# Patient Record
Sex: MALE | Race: WHITE | NOT HISPANIC OR LATINO | Employment: OTHER | URBAN - METROPOLITAN AREA
[De-identification: names, ages, dates, MRNs, and addresses within clinical notes are randomized per-mention and may not be internally consistent; named-entity substitution may affect disease eponyms.]

---

## 2018-04-16 ENCOUNTER — LAB VISIT (OUTPATIENT)
Dept: LAB | Facility: HOSPITAL | Age: 83
End: 2018-04-16
Attending: INTERNAL MEDICINE
Payer: COMMERCIAL

## 2018-04-16 DIAGNOSIS — I10 ESSENTIAL HYPERTENSION, MALIGNANT: Primary | ICD-10-CM

## 2018-04-16 LAB
ALBUMIN SERPL BCP-MCNC: 3.9 G/DL
ALP SERPL-CCNC: 46 U/L
ALT SERPL W/O P-5'-P-CCNC: 16 U/L
ANION GAP SERPL CALC-SCNC: 9 MMOL/L
AST SERPL-CCNC: 18 U/L
BASOPHILS # BLD AUTO: 0.04 K/UL
BASOPHILS NFR BLD: 0.5 %
BILIRUB SERPL-MCNC: 1 MG/DL
BUN SERPL-MCNC: 16 MG/DL
CALCIUM SERPL-MCNC: 9.6 MG/DL
CHLORIDE SERPL-SCNC: 101 MMOL/L
CHOLEST SERPL-MCNC: 167 MG/DL
CHOLEST/HDLC SERPL: 1.5 {RATIO}
CO2 SERPL-SCNC: 27 MMOL/L
CREAT SERPL-MCNC: 1.1 MG/DL
DIFFERENTIAL METHOD: ABNORMAL
EOSINOPHIL # BLD AUTO: 0.1 K/UL
EOSINOPHIL NFR BLD: 0.8 %
ERYTHROCYTE [DISTWIDTH] IN BLOOD BY AUTOMATED COUNT: 13.8 %
EST. GFR  (AFRICAN AMERICAN): >60 ML/MIN/1.73 M^2
EST. GFR  (NON AFRICAN AMERICAN): >60 ML/MIN/1.73 M^2
ESTIMATED AVG GLUCOSE: 105 MG/DL
GLUCOSE SERPL-MCNC: 108 MG/DL
HBA1C MFR BLD HPLC: 5.3 %
HCT VFR BLD AUTO: 40.4 %
HDLC SERPL-MCNC: 111 MG/DL
HDLC SERPL: 66.5 %
HGB BLD-MCNC: 13.5 G/DL
IMM GRANULOCYTES # BLD AUTO: 0.04 K/UL
IMM GRANULOCYTES NFR BLD AUTO: 0.5 %
LDLC SERPL CALC-MCNC: 50 MG/DL
LYMPHOCYTES # BLD AUTO: 1.4 K/UL
LYMPHOCYTES NFR BLD: 17.5 %
MCH RBC QN AUTO: 30.4 PG
MCHC RBC AUTO-ENTMCNC: 33.4 G/DL
MCV RBC AUTO: 91 FL
MONOCYTES # BLD AUTO: 0.7 K/UL
MONOCYTES NFR BLD: 8.6 %
NEUTROPHILS # BLD AUTO: 5.6 K/UL
NEUTROPHILS NFR BLD: 72.1 %
NONHDLC SERPL-MCNC: 56 MG/DL
NRBC BLD-RTO: 0 /100 WBC
PLATELET # BLD AUTO: 309 K/UL
PMV BLD AUTO: 9.2 FL
POTASSIUM SERPL-SCNC: 4 MMOL/L
PROT SERPL-MCNC: 6.9 G/DL
RBC # BLD AUTO: 4.44 M/UL
SODIUM SERPL-SCNC: 137 MMOL/L
TRIGL SERPL-MCNC: 30 MG/DL
TSH SERPL DL<=0.005 MIU/L-ACNC: 1.57 UIU/ML
WBC # BLD AUTO: 7.76 K/UL

## 2018-04-16 PROCEDURE — 83036 HEMOGLOBIN GLYCOSYLATED A1C: CPT

## 2018-04-16 PROCEDURE — 80053 COMPREHEN METABOLIC PANEL: CPT

## 2018-04-16 PROCEDURE — 80061 LIPID PANEL: CPT

## 2018-04-16 PROCEDURE — 84443 ASSAY THYROID STIM HORMONE: CPT

## 2018-04-16 PROCEDURE — 85025 COMPLETE CBC W/AUTO DIFF WBC: CPT

## 2018-04-16 PROCEDURE — 36415 COLL VENOUS BLD VENIPUNCTURE: CPT

## 2018-05-18 ENCOUNTER — TELEPHONE (OUTPATIENT)
Dept: DERMATOLOGY | Facility: CLINIC | Age: 83
End: 2018-05-18

## 2018-05-18 NOTE — TELEPHONE ENCOUNTER
----- Message from Leyla Nevarez sent at 5/18/2018 10:15 AM CDT -----  Contact: pt at [phone]  [facility]Pt needs to cancel and reschedule his appt that is on May 23. Can be reached at above number.Needs Medical Advice    Who Called:pt  Symptoms (please be specific): appt May 23   How long has patient had these symptoms:    Pharmacy name and phone # if needed:    Communication Preference (MyChart response to Pt. (or) Call Back # and timeframe):call/today  Additional Information:Needs to change his appt.

## 2019-04-08 ENCOUNTER — HOSPITAL ENCOUNTER (EMERGENCY)
Facility: HOSPITAL | Age: 84
Discharge: HOME | End: 2019-04-08
Attending: EMERGENCY MEDICINE
Payer: COMMERCIAL

## 2019-04-08 VITALS
TEMPERATURE: 99 F | BODY MASS INDEX: 23.1 KG/M2 | DIASTOLIC BLOOD PRESSURE: 68 MMHG | OXYGEN SATURATION: 93 % | WEIGHT: 165 LBS | HEIGHT: 71 IN | HEART RATE: 102 BPM | SYSTOLIC BLOOD PRESSURE: 116 MMHG | RESPIRATION RATE: 28 BRPM

## 2019-04-08 DIAGNOSIS — R06.00 DYSPNEA: ICD-10-CM

## 2019-04-08 DIAGNOSIS — J18.9 PNEUMONIA OF RIGHT LOWER LOBE DUE TO INFECTIOUS ORGANISM: Primary | ICD-10-CM

## 2019-04-08 LAB
ALBUMIN SERPL BCP-MCNC: 3.3 G/DL (ref 3.5–5.2)
ALP SERPL-CCNC: 48 U/L (ref 55–135)
ALT SERPL W/O P-5'-P-CCNC: 12 U/L (ref 10–44)
ANION GAP SERPL CALC-SCNC: 9 MMOL/L (ref 8–16)
AST SERPL-CCNC: 19 U/L (ref 10–40)
BASOPHILS # BLD AUTO: 0.03 K/UL (ref 0–0.2)
BASOPHILS NFR BLD: 0.2 % (ref 0–1.9)
BILIRUB SERPL-MCNC: 1.1 MG/DL (ref 0.1–1)
BUN SERPL-MCNC: 16 MG/DL (ref 8–23)
CALCIUM SERPL-MCNC: 8.8 MG/DL (ref 8.7–10.5)
CHLORIDE SERPL-SCNC: 99 MMOL/L (ref 95–110)
CO2 SERPL-SCNC: 22 MMOL/L (ref 23–29)
CREAT SERPL-MCNC: 1.1 MG/DL (ref 0.5–1.4)
DIFFERENTIAL METHOD: ABNORMAL
EOSINOPHIL # BLD AUTO: 0 K/UL (ref 0–0.5)
EOSINOPHIL NFR BLD: 0 % (ref 0–8)
ERYTHROCYTE [DISTWIDTH] IN BLOOD BY AUTOMATED COUNT: 14.9 % (ref 11.5–14.5)
EST. GFR  (AFRICAN AMERICAN): >60 ML/MIN/1.73 M^2
EST. GFR  (NON AFRICAN AMERICAN): >60 ML/MIN/1.73 M^2
GLUCOSE SERPL-MCNC: 128 MG/DL (ref 70–110)
HCT VFR BLD AUTO: 33.7 % (ref 40–54)
HGB BLD-MCNC: 11.2 G/DL (ref 14–18)
IMM GRANULOCYTES # BLD AUTO: 0.14 K/UL (ref 0–0.04)
IMM GRANULOCYTES NFR BLD AUTO: 1 % (ref 0–0.5)
INFLUENZA A, MOLECULAR: NEGATIVE
INFLUENZA B, MOLECULAR: NEGATIVE
LACTATE SERPL-SCNC: 1.1 MMOL/L (ref 0.5–2.2)
LYMPHOCYTES # BLD AUTO: 0.5 K/UL (ref 1–4.8)
LYMPHOCYTES NFR BLD: 3.3 % (ref 18–48)
MCH RBC QN AUTO: 29.3 PG (ref 27–31)
MCHC RBC AUTO-ENTMCNC: 33.2 G/DL (ref 32–36)
MCV RBC AUTO: 88 FL (ref 82–98)
MONOCYTES # BLD AUTO: 0.7 K/UL (ref 0.3–1)
MONOCYTES NFR BLD: 4.7 % (ref 4–15)
NEUTROPHILS # BLD AUTO: 12.6 K/UL (ref 1.8–7.7)
NEUTROPHILS NFR BLD: 90.8 % (ref 38–73)
NRBC BLD-RTO: 0 /100 WBC
PLATELET # BLD AUTO: 311 K/UL (ref 150–350)
PMV BLD AUTO: 9.5 FL (ref 9.2–12.9)
POTASSIUM SERPL-SCNC: 3.7 MMOL/L (ref 3.5–5.1)
PROT SERPL-MCNC: 7 G/DL (ref 6–8.4)
RBC # BLD AUTO: 3.82 M/UL (ref 4.6–6.2)
SODIUM SERPL-SCNC: 130 MMOL/L (ref 136–145)
SPECIMEN SOURCE: NORMAL
WBC # BLD AUTO: 13.84 K/UL (ref 3.9–12.7)

## 2019-04-08 PROCEDURE — 71046 X-RAY EXAM CHEST 2 VIEWS: CPT | Mod: 26,,, | Performed by: RADIOLOGY

## 2019-04-08 PROCEDURE — 85025 COMPLETE CBC W/AUTO DIFF WBC: CPT

## 2019-04-08 PROCEDURE — 87502 INFLUENZA DNA AMP PROBE: CPT

## 2019-04-08 PROCEDURE — 80053 COMPREHEN METABOLIC PANEL: CPT

## 2019-04-08 PROCEDURE — 71046 X-RAY EXAM CHEST 2 VIEWS: CPT | Mod: TC,FY

## 2019-04-08 PROCEDURE — 63600175 PHARM REV CODE 636 W HCPCS: Performed by: EMERGENCY MEDICINE

## 2019-04-08 PROCEDURE — 94640 AIRWAY INHALATION TREATMENT: CPT

## 2019-04-08 PROCEDURE — 71046 XR CHEST PA AND LATERAL: ICD-10-PCS | Mod: 26,,, | Performed by: RADIOLOGY

## 2019-04-08 PROCEDURE — 25000242 PHARM REV CODE 250 ALT 637 W/ HCPCS: Performed by: EMERGENCY MEDICINE

## 2019-04-08 PROCEDURE — 96365 THER/PROPH/DIAG IV INF INIT: CPT

## 2019-04-08 PROCEDURE — 99284 EMERGENCY DEPT VISIT MOD MDM: CPT | Mod: 25

## 2019-04-08 PROCEDURE — 83605 ASSAY OF LACTIC ACID: CPT

## 2019-04-08 PROCEDURE — 87040 BLOOD CULTURE FOR BACTERIA: CPT | Mod: 59

## 2019-04-08 RX ORDER — IPRATROPIUM BROMIDE AND ALBUTEROL SULFATE 2.5; .5 MG/3ML; MG/3ML
3 SOLUTION RESPIRATORY (INHALATION)
Status: COMPLETED | OUTPATIENT
Start: 2019-04-08 | End: 2019-04-08

## 2019-04-08 RX ORDER — LEVOFLOXACIN 750 MG/1
750 TABLET ORAL DAILY
Qty: 5 TABLET | Refills: 0 | Status: SHIPPED | OUTPATIENT
Start: 2019-04-08 | End: 2021-03-03 | Stop reason: CLARIF

## 2019-04-08 RX ADMIN — CEFTRIAXONE 1 G: 1 INJECTION, SOLUTION INTRAVENOUS at 09:04

## 2019-04-08 RX ADMIN — IPRATROPIUM BROMIDE AND ALBUTEROL SULFATE 3 ML: .5; 3 SOLUTION RESPIRATORY (INHALATION) at 09:04

## 2019-04-09 NOTE — DISCHARGE INSTRUCTIONS
Rocephin 1 g IV piggyback    At home  Levaquin 750 mg 1 daily for 5 days    Call Dr. Martinez in the morning and follow up in clinic tomorrow as directed     Return to the emergency department as needed for any progressive worsening

## 2019-04-09 NOTE — ED NOTES
"Pt ambulates in hallway with slightly unsteady gait. Pt reports "that is nothing new" VS obtained when back in room  "

## 2019-04-09 NOTE — ED NOTES
Pt states that he feels better and is ready to go home. Awaiting re-eval. Family at bs. Will continue to monitor.

## 2019-04-14 LAB
BACTERIA BLD CULT: NORMAL
BACTERIA BLD CULT: NORMAL

## 2019-04-15 NOTE — ED PROVIDER NOTES
Encounter Date: 4/8/2019       History     Chief Complaint   Patient presents with    Fever    Weakness    Vomiting    Shortness of Breath     [age] year old male presents complaining of several days of progressive fever generalized weakness shortness of breath with cough and occasional vomiting    Denies any acute chest pain  Denies any acute dyspnea  Denies hemoptysis        Review of patient's allergies indicates:  No Known Allergies  Past Medical History:   Diagnosis Date    Hyperlipidemia     Hypertension      Past Surgical History:   Procedure Laterality Date    TONSILLECTOMY       History reviewed. No pertinent family history.  Social History     Tobacco Use    Smoking status: Former Smoker   Substance Use Topics    Alcohol use: Yes    Drug use: Never     Review of Systems   Constitutional: Positive for activity change, chills, diaphoresis, fatigue and fever. Negative for appetite change and unexpected weight change.   HENT: Positive for congestion.    Eyes: Negative for discharge.   Respiratory: Positive for cough. Negative for choking, chest tightness, shortness of breath, wheezing and stridor.    Cardiovascular: Negative.    Gastrointestinal: Positive for vomiting. Negative for abdominal pain, diarrhea and nausea.   Genitourinary: Negative.    Musculoskeletal: Negative.    Skin: Negative.    Neurological: Negative.    Hematological: Negative.    Psychiatric/Behavioral: Negative for confusion.   All other systems reviewed and are negative.      Physical Exam     Initial Vitals [04/08/19 1928]   BP Pulse Resp Temp SpO2   135/71 (!) 112 (!) 24 98.9 °F (37.2 °C) (!) 93 %      MAP       --         Physical Exam    Nursing note and vitals reviewed.  Constitutional: He appears well-developed and well-nourished. He is not diaphoretic. No distress.   HENT:   Head: Normocephalic and atraumatic.   Mouth/Throat: Oropharynx is clear and moist.   Eyes: Conjunctivae and EOM are normal. Pupils are equal,  round, and reactive to light.   Neck: Neck supple. Carotid bruit is not present. No JVD present.   Cardiovascular: Regular rhythm, S1 normal, S2 normal, normal heart sounds and intact distal pulses.  No extrasystoles are present.  Tachycardia present.  Exam reveals no gallop and no friction rub.    No murmur heard.  Pulses:       Radial pulses are 2+ on the right side, and 2+ on the left side.   Pulmonary/Chest: No respiratory distress. He has decreased breath sounds. He has no wheezes. He has rhonchi in the right lower field and the left lower field. He has no rales. He exhibits no tenderness.   Abdominal: Soft. Bowel sounds are normal. He exhibits no distension and no mass. There is no tenderness. There is no rebound and no guarding.   Musculoskeletal: Normal range of motion. He exhibits no edema or tenderness.   Neurological: He is alert and oriented to person, place, and time. He has normal strength. No sensory deficit.   Skin: Skin is warm and dry. Capillary refill takes less than 2 seconds. No rash noted. No erythema. No pallor.   Psychiatric: He has a normal mood and affect. His behavior is normal. Judgment and thought content normal.         ED Course   Procedures  Labs Reviewed   CBC W/ AUTO DIFFERENTIAL - Abnormal; Notable for the following components:       Result Value    WBC 13.84 (*)     RBC 3.82 (*)     Hemoglobin 11.2 (*)     Hematocrit 33.7 (*)     RDW 14.9 (*)     Immature Granulocytes 1.0 (*)     Gran # (ANC) 12.6 (*)     Immature Grans (Abs) 0.14 (*)     Lymph # 0.5 (*)     Gran% 90.8 (*)     Lymph% 3.3 (*)     All other components within normal limits   COMPREHENSIVE METABOLIC PANEL - Abnormal; Notable for the following components:    Sodium 130 (*)     CO2 22 (*)     Glucose 128 (*)     Albumin 3.3 (*)     Total Bilirubin 1.1 (*)     Alkaline Phosphatase 48 (*)     All other components within normal limits   CULTURE, BLOOD   INFLUENZA A & B BY MOLECULAR   CULTURE, BLOOD   LACTIC ACID, PLASMA           Imaging Results          X-Ray Chest PA And Lateral (Final result)  Result time 04/09/19 06:31:02   Procedure changed from X-Ray Chest 1 View     Final result by Cheng Diaz MD (04/09/19 06:31:02)                 Impression:      1. Pulmonary infiltrates of the right upper and right lower lobe consistent with a multi lobar pneumonia.  Correlate clinically with possible fever and/or elevated white count.  2. Small right pleural effusion.  3. Cardiomegaly.  4. No underlying COPD.  Close interval follow-up is recommended.      Electronically signed by: Cheng Diaz  Date:    04/09/2019  Time:    06:31             Narrative:    EXAMINATION:  XR CHEST PA AND LATERAL    CLINICAL HISTORY:  COUGH; Dyspnea, unspecified    TECHNIQUE:  PA and lateral views of the chest were performed.    COMPARISON:  None    FINDINGS:  There is pulmonary hyperinflation with flattening diaphragms consistent with COPD.  Interstitial and alveolar pulmonary infiltrates are present within the right upper lobe in the posterior aspect of the right lower lobe consistent with a multi lobar pneumonia.  There is a small right pleural effusion.  Dependent atelectasis is present at the left lung base.    Heart size is enlarged.  Calcified aortic plaque.  Trachea midline.    Bony thorax intact.                                 Medical Decision Making:   Clinical Tests:   Lab Tests: Ordered and Reviewed  Radiological Study: Ordered and Reviewed  ED Management:  R pneumonia  Pt desires DC home with close follow up rather than suggested admission to [facility] for treatment and re evaluation tomorrow.          Rocephin 1 g IV piggyback    At home  Levaquin 750 mg 1 daily for 5 days    Call Dr. Martinez in the morning and follow up in clinic tomorrow as directed     Return to the emergency department as needed for any progressive worsening  Other:   I have discussed this case with another health care provider.       <> Summary of the Discussion:   Juan prior to patients DC home                       Clinical Impression:       ICD-10-CM ICD-9-CM   1. Pneumonia of right lower lobe due to infectious organism J18.1 486   2. Dyspnea R06.00 786.09         Disposition:   Disposition: Discharged  Condition: Stable                        Hernando Ramirez MD  04/15/19 0614

## 2020-06-17 ENCOUNTER — OFFICE VISIT (OUTPATIENT)
Dept: SURGERY | Facility: CLINIC | Age: 85
End: 2020-06-17
Payer: MEDICARE

## 2020-06-17 VITALS
OXYGEN SATURATION: 98 % | WEIGHT: 192.44 LBS | TEMPERATURE: 98 F | SYSTOLIC BLOOD PRESSURE: 127 MMHG | HEART RATE: 77 BPM | BODY MASS INDEX: 26.07 KG/M2 | RESPIRATION RATE: 14 BRPM | DIASTOLIC BLOOD PRESSURE: 78 MMHG | HEIGHT: 72 IN

## 2020-06-17 DIAGNOSIS — Z01.818 PRE-OPERATIVE EXAM: ICD-10-CM

## 2020-06-17 DIAGNOSIS — R19.5 POSITIVE COLORECTAL CANCER SCREENING USING COLOGUARD TEST: Primary | ICD-10-CM

## 2020-06-17 PROCEDURE — 99204 OFFICE O/P NEW MOD 45 MIN: CPT | Mod: S$GLB,,, | Performed by: SURGERY

## 2020-06-17 PROCEDURE — 1159F PR MEDICATION LIST DOCUMENTED IN MEDICAL RECORD: ICD-10-PCS | Mod: S$GLB,,, | Performed by: SURGERY

## 2020-06-17 PROCEDURE — 99204 PR OFFICE/OUTPT VISIT, NEW, LEVL IV, 45-59 MIN: ICD-10-PCS | Mod: S$GLB,,, | Performed by: SURGERY

## 2020-06-17 PROCEDURE — 1101F PR PT FALLS ASSESS DOC 0-1 FALLS W/OUT INJ PAST YR: ICD-10-PCS | Mod: CPTII,S$GLB,, | Performed by: SURGERY

## 2020-06-17 PROCEDURE — 1126F PR PAIN SEVERITY QUANTIFIED, NO PAIN PRESENT: ICD-10-PCS | Mod: S$GLB,,, | Performed by: SURGERY

## 2020-06-17 PROCEDURE — 1159F MED LIST DOCD IN RCRD: CPT | Mod: S$GLB,,, | Performed by: SURGERY

## 2020-06-17 PROCEDURE — 1126F AMNT PAIN NOTED NONE PRSNT: CPT | Mod: S$GLB,,, | Performed by: SURGERY

## 2020-06-17 PROCEDURE — 1101F PT FALLS ASSESS-DOCD LE1/YR: CPT | Mod: CPTII,S$GLB,, | Performed by: SURGERY

## 2020-06-17 RX ORDER — TAMSULOSIN HYDROCHLORIDE 0.4 MG/1
0.4 CAPSULE ORAL NIGHTLY
COMMUNITY
End: 2022-10-18 | Stop reason: SDUPTHER

## 2020-06-17 RX ORDER — ESZOPICLONE 3 MG/1
3 TABLET, FILM COATED ORAL NIGHTLY
COMMUNITY
End: 2022-10-18

## 2020-06-17 RX ORDER — SODIUM CHLORIDE 9 MG/ML
INJECTION, SOLUTION INTRAVENOUS CONTINUOUS
Status: CANCELLED | OUTPATIENT
Start: 2020-06-17

## 2020-06-17 RX ORDER — OXYCODONE AND ACETAMINOPHEN 10; 325 MG/1; MG/1
1 TABLET ORAL EVERY 8 HOURS PRN
COMMUNITY
End: 2021-03-03 | Stop reason: CLARIF

## 2020-06-17 RX ORDER — AMLODIPINE BESYLATE 10 MG/1
10 TABLET ORAL DAILY
COMMUNITY
End: 2022-10-18 | Stop reason: SDUPTHER

## 2020-06-17 RX ORDER — SIMVASTATIN 40 MG/1
40 TABLET, FILM COATED ORAL NIGHTLY
COMMUNITY
End: 2022-10-18 | Stop reason: SDUPTHER

## 2020-06-17 NOTE — H&P
[facility] Surgery H&P Note    Subjective:       Patient ID: Robert Nava is a 87 y.o. male.    Chief Complaint: Consult (REferral- Josh- (+) Colstevie)    HPI:  Robert Nava is a 87 y.o. male with a history of hypertension hyperlipidemia presents today as a new patient referral from Dr. Moreno for evaluation of a positive colo Guard test results.  Patient had a wife who was diagnosed with stage IV colon cancer subsequently underwent colon resection and a since passed away.  Patient presented to his primary care physician, Dr. Moreno, and underwent a colo Guard test for screening of the patient's colon.  Rupert Guard test results resulted as positive.  Patient does not endorse blood in the stool.  No personal history or related family history of colon cancer.  No unexplained bowel habit changes or weight loss.  Given the positive colo Guard test results recently obtained, patient was referred today for evaluation management treatment.    Past Medical History:   Diagnosis Date    Hyperlipidemia     Hypertension      Past Surgical History:   Procedure Laterality Date    COLONOSCOPY      aprox 2008    TONSILLECTOMY       History reviewed. No pertinent family history.  Social History     Socioeconomic History    Marital status: Single     Spouse name: Not on file    Number of children: Not on file    Years of education: Not on file    Highest education level: Not on file   Occupational History    Not on file   Social Needs    Financial resource strain: Not on file    Food insecurity     Worry: Not on file     Inability: Not on file    Transportation needs     Medical: Not on file     Non-medical: Not on file   Tobacco Use    Smoking status: Former Smoker    Smokeless tobacco: Never Used   Substance and Sexual Activity    Alcohol use: Yes    Drug use: Never    Sexual activity: Not Currently   Lifestyle    Physical activity     Days per week: Not on file     Minutes per session: Not  on file    Stress: Not on file   Relationships    Social connections     Talks on phone: Not on file     Gets together: Not on file     Attends Mandaeism service: Not on file     Active member of club or organization: Not on file     Attends meetings of clubs or organizations: Not on file     Relationship status: Not on file   Other Topics Concern    Not on file   Social History Narrative    Not on file       Current Outpatient Medications   Medication Sig Dispense Refill    amLODIPine (NORVASC) 10 MG tablet Take 10 mg by mouth once daily.      eszopiclone (LUNESTA) 3 mg Tab Take 3 mg by mouth every evening.      oxyCODONE-acetaminophen (PERCOCET)  mg per tablet Take 1 tablet by mouth every 8 (eight) hours as needed for Pain.      simvastatin (ZOCOR) 40 MG tablet Take 40 mg by mouth every evening.      tamsulosin (FLOMAX) 0.4 mg Cap Take 0.4 mg by mouth every evening.      levoFLOXacin (LEVAQUIN) 750 MG tablet Take 1 tablet (750 mg total) by mouth once daily. (Patient not taking: Reported on 6/17/2020) 5 tablet 0     No current facility-administered medications for this visit.      Review of patient's allergies indicates:  No Known Allergies    Review of Systems   Constitutional: Negative for appetite change, chills and fever.   HENT: Negative for congestion, dental problem and drooling.    Eyes: Negative for photophobia, discharge and itching.   Respiratory: Negative for apnea and chest tightness.    Cardiovascular: Negative for chest pain, palpitations and leg swelling.   Gastrointestinal: Negative for abdominal distention and abdominal pain.   Endocrine: Negative for cold intolerance and heat intolerance.   Genitourinary: Negative for difficulty urinating and dysuria.   Musculoskeletal: Negative for arthralgias and back pain.   Skin: Negative for color change and pallor.   Neurological: Negative for dizziness, facial asymmetry and headaches.   Hematological: Negative for adenopathy. Does not  "bruise/bleed easily.   Psychiatric/Behavioral: Negative for agitation, behavioral problems and confusion.       Objective:      Vitals:    06/17/20 1037   BP: 127/78   BP Location: Left arm   Patient Position: Sitting   BP Method: Large (Automatic)   Pulse: 77   Resp: 14   Temp: 98.3 °F (36.8 °C)   TempSrc: Oral   SpO2: 98%   Weight: 87.3 kg (192 lb 7.4 oz)   Height: 5' 11.5" (1.816 m)     Physical Exam  Constitutional:       Appearance: He is well-developed. He is not diaphoretic.   HENT:      Head: Normocephalic and atraumatic.   Eyes:      Pupils: Pupils are equal, round, and reactive to light.   Neck:      Musculoskeletal: Normal range of motion and neck supple.      Thyroid: No thyromegaly.   Cardiovascular:      Rate and Rhythm: Normal rate and regular rhythm.      Heart sounds: No murmur.   Pulmonary:      Effort: Pulmonary effort is normal. No respiratory distress.      Breath sounds: Normal breath sounds.   Abdominal:      General: Bowel sounds are normal. There is no distension.      Palpations: Abdomen is soft.      Tenderness: There is no abdominal tenderness.   Musculoskeletal: Normal range of motion.   Skin:     General: Skin is warm.      Capillary Refill: Capillary refill takes less than 2 seconds.      Findings: No erythema or rash.   Neurological:      Mental Status: He is alert and oriented to person, place, and time.      Cranial Nerves: No cranial nerve deficit.       Cologuard test positive.     Assessment:       1. Positive colorectal cancer screening using Cologuard test    2. Pre-operative exam        Plan:   Positive colorectal cancer screening using Cologuard test  -     CBC auto differential; Future; Expected date: 09/17/2020  -     Comprehensive metabolic panel; Future; Expected date: 09/17/2020  -     EKG 12-lead; Future; Expected date: 09/17/2020  -     Case Request Operating Room: COLONOSCOPY  -     Insert peripheral IV; Standing  -     Full code; Standing    Pre-operative exam  -     " EKG 12-lead; Future; Expected date: 09/17/2020  -     Case Request Operating Room: COLONOSCOPY  -     Insert peripheral IV; Standing  -     Full code; Standing    Other orders  -     Progressive Mobility Protocol (mobilize patient to their highest level of functioning at least twice daily); Standing        Medical Decision Making/Counseling:  Patient with positive colo Guard test results.  Patient in need of diagnostic colonoscopy given positive colo Guard test results.  Risk and benefits of colonoscopy were discussed in detail in clinic with the patient today.  After risk benefits discussion, patient voiced understanding risk benefits wished to proceed the near future.  All questions were answered to the patient's satisfaction.    Will obtain preoperative lab test to include CBC, CMP and EKG for review by the Anesthesiologist the day of the procedure prior to induction of the anesthetic agent of choice.    Risks and benefits of endoscopy were discussed in depth in clinic.  From a procedural standpoint, we discuss the benefits of colonoscopy to be finding colon cancers at early stages, including polyps which can be endoscopically resectable, to finding early stage colon cancers which can be better treated with current medical and surgical therapies in order to give patients a longer survival, if found in these early stages.  From a standpoint of risks, the risk of bleeding and perforation of the colon were discussed.  I personally discussed that if complications of bleeding or perforation were to occur, the patient could need as little as a blood transfusion and as much as possible hospital admission, repeat procedure, or even surgery.  During today's discussion of the procedure of colonoscopy with the patient, I personally jay the patient a picture to assist with counseling.  Total clinic time spent today 45 minutes with greater than half of the time spent in face to face counseling.    Patient instructed that  best way to communicate with my office staff is for patient to get on the [facility] patient portal to expedite communication and communication issues that may occur.  Patient was given instructions on how to get on the portal.  I encouraged patient to obtain portal access as well.  Ultimately it is up to the patient to obtain access.  Patient voiced understanding.

## 2020-06-17 NOTE — LETTER
June 17, 2020      Warren Moreno MD  [address] [place] [address]           [facility]  [address] [place] [address]  Phone: [phone]  Fax: [phone]          Patient: Robert Nava   MR Number: [id number]   YOB: 1933   Date of Visit: 6/17/2020       Dear Dr. Warren Moreno:    Thank you for referring Robert Nava to me for evaluation. Attached you will find relevant portions of my assessment and plan of care.    If you have questions, please do not hesitate to call me. I look forward to following Robert Nava along with you.    Sincerely,    Jersey Adam MD    Enclosure  CC:  No Recipients    If you would like to receive this communication electronically, please contact [email or web address] or [phone] to request more information on [employer] Link access.    For providers and/or their staff who would like to refer a patient to [facility], please contact us through our one-stop-shop provider referral line, [facility], at [phone].    If you feel you have received this communication in error or would no longer like to receive these types of communications, please e-mail [email or web address]

## 2020-07-24 ENCOUNTER — HOSPITAL ENCOUNTER (OUTPATIENT)
Dept: PREADMISSION TESTING | Facility: HOSPITAL | Age: 85
Discharge: HOME | End: 2020-07-24
Attending: SURGERY
Payer: MEDICARE

## 2020-07-24 ENCOUNTER — HOSPITAL ENCOUNTER (OUTPATIENT)
Dept: CARDIOLOGY | Facility: HOSPITAL | Age: 85
Discharge: HOME | End: 2020-07-24
Attending: SURGERY
Payer: MEDICARE

## 2020-07-24 VITALS — HEIGHT: 72 IN | BODY MASS INDEX: 26.14 KG/M2 | WEIGHT: 193 LBS

## 2020-07-24 DIAGNOSIS — R19.5 POSITIVE COLORECTAL CANCER SCREENING USING COLOGUARD TEST: ICD-10-CM

## 2020-07-24 DIAGNOSIS — Z01.818 PRE-OP EXAM: ICD-10-CM

## 2020-07-24 DIAGNOSIS — Z01.818 PRE-OPERATIVE EXAM: ICD-10-CM

## 2020-07-24 PROCEDURE — U0003 INFECTIOUS AGENT DETECTION BY NUCLEIC ACID (DNA OR RNA); SEVERE ACUTE RESPIRATORY SYNDROME CORONAVIRUS 2 (SARS-COV-2) (CORONAVIRUS DISEASE [COVID-19]), AMPLIFIED PROBE TECHNIQUE, MAKING USE OF HIGH THROUGHPUT TECHNOLOGIES AS DESCRIBED BY CMS-2020-01-R: HCPCS

## 2020-07-24 PROCEDURE — 93005 ELECTROCARDIOGRAM TRACING: CPT

## 2020-07-25 LAB — SARS-COV-2 RNA RESP QL NAA+PROBE: NOT DETECTED

## 2020-07-27 ENCOUNTER — HOSPITAL ENCOUNTER (OUTPATIENT)
Facility: HOSPITAL | Age: 85
Discharge: HOME | End: 2020-07-27
Attending: SURGERY | Admitting: SURGERY
Payer: MEDICARE

## 2020-07-27 ENCOUNTER — ANESTHESIA (OUTPATIENT)
Dept: SURGERY | Facility: HOSPITAL | Age: 85
End: 2020-07-27
Payer: MEDICARE

## 2020-07-27 ENCOUNTER — ANESTHESIA EVENT (OUTPATIENT)
Dept: SURGERY | Facility: HOSPITAL | Age: 85
End: 2020-07-27
Payer: MEDICARE

## 2020-07-27 VITALS
HEIGHT: 71 IN | DIASTOLIC BLOOD PRESSURE: 84 MMHG | BODY MASS INDEX: 27.02 KG/M2 | OXYGEN SATURATION: 98 % | TEMPERATURE: 98 F | SYSTOLIC BLOOD PRESSURE: 155 MMHG | RESPIRATION RATE: 21 BRPM | WEIGHT: 193 LBS | HEART RATE: 73 BPM

## 2020-07-27 DIAGNOSIS — R19.5 POSITIVE COLORECTAL CANCER SCREENING USING COLOGUARD TEST: ICD-10-CM

## 2020-07-27 DIAGNOSIS — Z01.818 PRE-OPERATIVE EXAM: ICD-10-CM

## 2020-07-27 PROCEDURE — 45388 COLONOSCOPY W/ABLATION: CPT | Performed by: SURGERY

## 2020-07-27 PROCEDURE — 45384 PR COLONOSCOPY,REMV LESN,FORCEP/CAUTERY: ICD-10-PCS | Mod: 59,,, | Performed by: SURGERY

## 2020-07-27 PROCEDURE — D9220A PRA ANESTHESIA: Mod: CRNA,,, | Performed by: NURSE ANESTHETIST, CERTIFIED REGISTERED

## 2020-07-27 PROCEDURE — S0028 INJECTION, FAMOTIDINE, 20 MG: HCPCS

## 2020-07-27 PROCEDURE — 27201423 OPTIME MED/SURG SUP & DEVICES STERILE SUPPLY: Performed by: SURGERY

## 2020-07-27 PROCEDURE — 45384 COLONOSCOPY W/LESION REMOVAL: CPT | Performed by: SURGERY

## 2020-07-27 PROCEDURE — 25000003 PHARM REV CODE 250

## 2020-07-27 PROCEDURE — D9220A PRA ANESTHESIA: ICD-10-PCS | Mod: CRNA,,, | Performed by: NURSE ANESTHETIST, CERTIFIED REGISTERED

## 2020-07-27 PROCEDURE — D9220A PRA ANESTHESIA: ICD-10-PCS | Mod: ANES,,, | Performed by: ANESTHESIOLOGY

## 2020-07-27 PROCEDURE — 45385 COLONOSCOPY W/LESION REMOVAL: CPT | Mod: 59 | Performed by: SURGERY

## 2020-07-27 PROCEDURE — 45388 COLONOSCOPY W/ABLATION: CPT | Mod: ,,, | Performed by: SURGERY

## 2020-07-27 PROCEDURE — 88305 TISSUE EXAM BY PATHOLOGIST: ICD-10-PCS | Mod: 26,,, | Performed by: PATHOLOGY

## 2020-07-27 PROCEDURE — 25000003 PHARM REV CODE 250: Performed by: SURGERY

## 2020-07-27 PROCEDURE — D9220A PRA ANESTHESIA: Mod: ANES,,, | Performed by: ANESTHESIOLOGY

## 2020-07-27 PROCEDURE — 37000009 HC ANESTHESIA EA ADD 15 MINS: Performed by: SURGERY

## 2020-07-27 PROCEDURE — 45382 COLONOSCOPY W/CONTROL BLEED: CPT | Performed by: SURGERY

## 2020-07-27 PROCEDURE — 88305 TISSUE EXAM BY PATHOLOGIST: CPT | Mod: 26,,, | Performed by: PATHOLOGY

## 2020-07-27 PROCEDURE — 88305 TISSUE EXAM BY PATHOLOGIST: CPT | Performed by: PATHOLOGY

## 2020-07-27 PROCEDURE — 45388: ICD-10-PCS | Mod: ,,, | Performed by: SURGERY

## 2020-07-27 PROCEDURE — 37000008 HC ANESTHESIA 1ST 15 MINUTES: Performed by: SURGERY

## 2020-07-27 PROCEDURE — 63600175 PHARM REV CODE 636 W HCPCS: Performed by: NURSE ANESTHETIST, CERTIFIED REGISTERED

## 2020-07-27 PROCEDURE — 45385 COLONOSCOPY W/LESION REMOVAL: CPT | Mod: 59,,, | Performed by: SURGERY

## 2020-07-27 PROCEDURE — 45385 PR COLONOSCOPY,REMV LESN,SNARE: ICD-10-PCS | Mod: 59,,, | Performed by: SURGERY

## 2020-07-27 PROCEDURE — 45384 COLONOSCOPY W/LESION REMOVAL: CPT | Mod: 59,,, | Performed by: SURGERY

## 2020-07-27 RX ORDER — SODIUM CHLORIDE, SODIUM LACTATE, POTASSIUM CHLORIDE, CALCIUM CHLORIDE 600; 310; 30; 20 MG/100ML; MG/100ML; MG/100ML; MG/100ML
125 INJECTION, SOLUTION INTRAVENOUS CONTINUOUS
Status: DISCONTINUED | OUTPATIENT
Start: 2020-07-27 | End: 2020-07-27 | Stop reason: HOSPADM

## 2020-07-27 RX ORDER — SODIUM CHLORIDE 9 MG/ML
INJECTION, SOLUTION INTRAVENOUS CONTINUOUS
Status: DISCONTINUED | OUTPATIENT
Start: 2020-07-27 | End: 2020-07-27 | Stop reason: HOSPADM

## 2020-07-27 RX ORDER — PROPOFOL 10 MG/ML
VIAL (ML) INTRAVENOUS
Status: DISCONTINUED | OUTPATIENT
Start: 2020-07-27 | End: 2020-07-27

## 2020-07-27 RX ORDER — FAMOTIDINE 10 MG/ML
INJECTION INTRAVENOUS
Status: COMPLETED
Start: 2020-07-27 | End: 2020-07-27

## 2020-07-27 RX ORDER — LIDOCAINE HYDROCHLORIDE 20 MG/ML
INJECTION INTRAVENOUS
Status: DISCONTINUED | OUTPATIENT
Start: 2020-07-27 | End: 2020-07-27

## 2020-07-27 RX ORDER — ONDANSETRON 2 MG/ML
4 INJECTION INTRAMUSCULAR; INTRAVENOUS DAILY PRN
Status: DISCONTINUED | OUTPATIENT
Start: 2020-07-27 | End: 2020-07-27 | Stop reason: HOSPADM

## 2020-07-27 RX ORDER — FAMOTIDINE 10 MG/ML
20 INJECTION INTRAVENOUS ONCE
Status: COMPLETED | OUTPATIENT
Start: 2020-07-27 | End: 2020-07-27

## 2020-07-27 RX ORDER — DIPHENHYDRAMINE HYDROCHLORIDE 50 MG/ML
12.5 INJECTION INTRAMUSCULAR; INTRAVENOUS
Status: DISCONTINUED | OUTPATIENT
Start: 2020-07-27 | End: 2020-07-27 | Stop reason: HOSPADM

## 2020-07-27 RX ORDER — SODIUM CHLORIDE, SODIUM LACTATE, POTASSIUM CHLORIDE, CALCIUM CHLORIDE 600; 310; 30; 20 MG/100ML; MG/100ML; MG/100ML; MG/100ML
INJECTION, SOLUTION INTRAVENOUS CONTINUOUS
Status: DISCONTINUED | OUTPATIENT
Start: 2020-07-27 | End: 2020-07-27 | Stop reason: HOSPADM

## 2020-07-27 RX ORDER — LIDOCAINE HYDROCHLORIDE 10 MG/ML
1 INJECTION, SOLUTION EPIDURAL; INFILTRATION; INTRACAUDAL; PERINEURAL ONCE
Status: DISCONTINUED | OUTPATIENT
Start: 2020-07-27 | End: 2020-07-27 | Stop reason: HOSPADM

## 2020-07-27 RX ADMIN — PROPOFOL 50 MG: 10 INJECTION, EMULSION INTRAVENOUS at 09:07

## 2020-07-27 RX ADMIN — SODIUM CHLORIDE: 0.9 INJECTION, SOLUTION INTRAVENOUS at 08:07

## 2020-07-27 RX ADMIN — LIDOCAINE HYDROCHLORIDE 100 MG: 20 INJECTION, SOLUTION INTRAVENOUS at 09:07

## 2020-07-27 RX ADMIN — FAMOTIDINE 20 MG: 10 INJECTION INTRAVENOUS at 08:07

## 2020-07-27 NOTE — TRANSFER OF CARE
"Anesthesia Transfer of Care Note    Patient: Robert Nava    Procedure(s) Performed: Procedure(s) (LRB):  COLONOSCOPY (N/A)    Patient location: PACU    Anesthesia Type: general    Transport from OR: Transported from OR on room air with adequate spontaneous ventilation    Post pain: adequate analgesia    Post assessment: no apparent anesthetic complications and tolerated procedure well    Post vital signs: stable    Level of consciousness: awake, alert and oriented    Nausea/Vomiting: no nausea/vomiting    Complications: none    Transfer of care protocol was followed      Last vitals:   Visit Vitals  BP (!) 141/79   Pulse 93   Temp 36.6 °C (97.8 °F) (Oral)   Resp 16   Ht 5' 11" (1.803 m)   Wt 87.5 kg (193 lb)   SpO2 98%   BMI 26.92 kg/m²     "

## 2020-07-27 NOTE — ANESTHESIA PREPROCEDURE EVALUATION
07/27/2020  Robert Nava is a 87 y.o., male.    Anesthesia Evaluation    I have reviewed the Patient Summary Reports.    I have reviewed the Nursing Notes. I have reviewed the NPO Status.   I have reviewed the Medications.     Review of Systems  Hematology/Oncology:  Hematology Normal   Oncology Normal     EENT/Dental:EENT/Dental Normal   Cardiovascular:   Hypertension Dysrhythmias    Pulmonary:  Pulmonary Normal    Musculoskeletal:  Musculoskeletal Normal    Neurological:  Neurology Normal    Dermatological:  Skin Normal    Psych:  Psychiatric Normal           Physical Exam  General:  Well nourished    Airway/Jaw/Neck:  Airway Findings: Tongue: Normal General Airway Assessment: Adult  Mallampati: II  TM Distance: Normal, at least 6 cm      Dental:  Dental Findings: Edentulous   Chest/Lungs:  Chest/Lungs Findings: Clear to auscultation     Heart/Vascular:  Heart Findings: Rate: Normal  Rhythm: Regular Rhythm        Mental Status:  Mental Status Findings:  Cooperative, Alert and Oriented         Anesthesia Plan  Type of Anesthesia, risks & benefits discussed:  Anesthesia Type:  general  Patient's Preference:   Intra-op Monitoring Plan: standard ASA monitors  Intra-op Monitoring Plan Comments:   Post Op Pain Control Plan: IV/PO Opioids PRN  Post Op Pain Control Plan Comments:   Induction:   IV  Beta Blocker:  Patient is not currently on a Beta-Blocker (No further documentation required).       Informed Consent: Patient understands risks and agrees with Anesthesia plan.  Questions answered. Anesthesia consent signed with patient.  ASA Score: 3     Day of Surgery Review of History & Physical: I have interviewed and examined the patient. I have reviewed the patient's H&P dated:            Ready For Surgery From Anesthesia Perspective.

## 2020-07-27 NOTE — PLAN OF CARE
Has met unit/department guidelines for discharge from each phase of the post procedure continuum. Leaving floor per w/c with RN and daughter. AAO x3. Resp even and unlabored room air. No distress noted. All personal belongings returned to pt.

## 2020-07-27 NOTE — DISCHARGE SUMMARY
Discharge Note        SUMMARY     Admit Date: 7/27/2020    Attending Physician: Jersey Adam MD     Discharge Physician: Jersey Adam MD    Discharge Date: 7/27/2020 10:07 AM      Hospital Course: Patient tolerated procedure well.     Disposition: Home or Self Care    Patient Instructions:   Current Discharge Medication List      CONTINUE these medications which have NOT CHANGED    Details   amLODIPine (NORVASC) 10 MG tablet Take 10 mg by mouth once daily.    Comments: .      eszopiclone (LUNESTA) 3 mg Tab Take 3 mg by mouth every evening.      oxyCODONE-acetaminophen (PERCOCET)  mg per tablet Take 1 tablet by mouth every 8 (eight) hours as needed for Pain.    Comments: n/a       simvastatin (ZOCOR) 40 MG tablet Take 40 mg by mouth every evening.      tamsulosin (FLOMAX) 0.4 mg Cap Take 0.4 mg by mouth every evening.      levoFLOXacin (LEVAQUIN) 750 MG tablet Take 1 tablet (750 mg total) by mouth once daily.  Qty: 5 tablet, Refills: 0    Associated Diagnoses: Pneumonia of right lower lobe due to infectious organism             Discharge Procedure Orders (must include Diet, Follow-up, Activity):   Discharge Procedure Orders (must include Diet, Follow-up, Activity)   Diet general     Call MD for:  temperature >100.4     Call MD for:  persistent nausea and vomiting     Call MD for:  severe uncontrolled pain     Call MD for:  difficulty breathing, headache or visual disturbances     Call MD for:  redness, tenderness, or signs of infection (pain, swelling, redness, odor or green/yellow discharge around incision site)     Call MD for:  persistent dizziness or light-headedness        Follow Up:  Follow up as scheduled.  Resume routine diet.  Activity as tolerated.    No driving day of procedure.

## 2020-07-27 NOTE — PROGRESS NOTES
[facility] Surgery H&P Note    Subjective:       Patient ID: Robert Nava is a 87 y.o. male.    Chief Complaint: No chief complaint on file.    HPI:  Robert Nava is a 87 y.o. male with a history of hypertension hyperlipidemia presents today as a new patient referral from Dr. Moreno for evaluation of a positive colo Guard test results.  Patient had a wife who was diagnosed with stage IV colon cancer subsequently underwent colon resection and a since passed away.  Patient presented to his primary care physician, Dr. Moreno, and underwent a colo Guard test for screening of the patient's colon.  [place] Guard test results resulted as positive.  Patient does not endorse blood in the stool.  No personal history or related family history of colon cancer.  No unexplained bowel habit changes or weight loss.  Given the positive colo Guard test results recently obtained, patient was referred today for evaluation management treatment.    Past Medical History:   Diagnosis Date    Hyperlipidemia     Hypertension      Past Surgical History:   Procedure Laterality Date    COLONOSCOPY      aprox 2008    TONSILLECTOMY       History reviewed. No pertinent family history.  Social History     Socioeconomic History    Marital status: Single     Spouse name: Not on file    Number of children: Not on file    Years of education: Not on file    Highest education level: Not on file   Occupational History    Not on file   Social Needs    Financial resource strain: Not on file    Food insecurity     Worry: Not on file     Inability: Not on file    Transportation needs     Medical: Not on file     Non-medical: Not on file   Tobacco Use    Smoking status: Never Smoker    Smokeless tobacco: Never Used   Substance and Sexual Activity    Alcohol use: Yes    Drug use: Never    Sexual activity: Not Currently   Lifestyle    Physical activity     Days per week: Not on file     Minutes per session: Not on file     Stress: Not on file   Relationships    Social connections     Talks on phone: Not on file     Gets together: Not on file     Attends Nondenominational service: Not on file     Active member of club or organization: Not on file     Attends meetings of clubs or organizations: Not on file     Relationship status: Not on file   Other Topics Concern    Not on file   Social History Narrative    Not on file       Current Facility-Administered Medications   Medication Dose Route Frequency Provider Last Rate Last Dose    0.9%  NaCl infusion   Intravenous Continuous Jersey Adam MD 20 mL/hr at 07/27/20 0845      lactated ringers infusion   Intravenous Continuous Robert Jaime MD        lidocaine (PF) 10 mg/ml (1%) injection 10 mg  1 mL Intradermal Once Robert Jaime MD         Review of patient's allergies indicates:  No Known Allergies    Review of Systems   Constitutional: Negative for appetite change, chills and fever.   HENT: Negative for congestion, dental problem and drooling.    Eyes: Negative for photophobia, discharge and itching.   Respiratory: Negative for apnea and chest tightness.    Cardiovascular: Negative for chest pain, palpitations and leg swelling.   Gastrointestinal: Negative for abdominal distention and abdominal pain.   Endocrine: Negative for cold intolerance and heat intolerance.   Genitourinary: Negative for difficulty urinating and dysuria.   Musculoskeletal: Negative for arthralgias and back pain.   Skin: Negative for color change and pallor.   Neurological: Negative for dizziness, facial asymmetry and headaches.   Hematological: Negative for adenopathy. Does not bruise/bleed easily.   Psychiatric/Behavioral: Negative for agitation, behavioral problems and confusion.       Objective:      Vitals:    07/27/20 0839 07/27/20 0841   BP: (!) 141/79 (!) 141/79   BP Location: Right arm    Patient Position: Lying    Pulse: 93    Resp: 16    Temp: 97.8 °F (36.6 °C)    TempSrc: Oral    SpO2: 98%  "   Weight: 87.5 kg (193 lb)    Height: 5' 11" (1.803 m)      Physical Exam  Constitutional:       Appearance: He is well-developed. He is not diaphoretic.   HENT:      Head: Normocephalic and atraumatic.   Eyes:      Pupils: Pupils are equal, round, and reactive to light.   Neck:      Musculoskeletal: Normal range of motion and neck supple.      Thyroid: No thyromegaly.   Cardiovascular:      Rate and Rhythm: Normal rate and regular rhythm.      Heart sounds: No murmur.   Pulmonary:      Effort: Pulmonary effort is normal. No respiratory distress.      Breath sounds: Normal breath sounds.   Abdominal:      General: Bowel sounds are normal. There is no distension.      Palpations: Abdomen is soft.      Tenderness: There is no abdominal tenderness.   Musculoskeletal: Normal range of motion.   Skin:     General: Skin is warm.      Capillary Refill: Capillary refill takes less than 2 seconds.      Findings: No erythema or rash.   Neurological:      Mental Status: He is alert and oriented to person, place, and time.      Cranial Nerves: No cranial nerve deficit.       Cologuard test positive.     Assessment:       1. Positive colorectal cancer screening using Cologuard test    2. Pre-operative exam        Plan:   Positive colorectal cancer screening using Cologuard test  -     Case Request Operating Room: COLONOSCOPY  -     Place in Outpatient; Standing  -     Cancel: Diet NPO; Standing  -     Notify Physician; Standing  -     Vital signs; Standing  -     Place sequential compression device; Standing  -     Place TAD hose; Standing    Pre-operative exam  -     Case Request Operating Room: COLONOSCOPY  -     Place in Outpatient; Standing  -     Cancel: Diet NPO; Standing  -     Notify Physician; Standing  -     Vital signs; Standing  -     Place sequential compression device; Standing  -     Place TAD hose; Standing    Other orders  -     0.9%  NaCl infusion  -     famotidine (PF) 20 mg/2 mL injection  -     Vital signs; " Standing  -     Insert peripheral IV; Standing  -     Use 1% lidocaine at IV site; Standing  -     Nursing to confirm consent for anesthesia services; Standing  -     Diet NPO Except for: Medication; Standing  -     lactated ringers infusion  -     Notify Anesthesiologist if Patient on Home Insulin Pump; Standing  -     lidocaine (PF) 10 mg/ml (1%) injection 10 mg  -     famotidine (PF) injection 20 mg  -     POCT glucose; Standing  -     Pregnancy, urine rapid; Standing  -     POCT glucose; Standing        Medical Decision Making/Counseling:  Patient with positive colo Guard test results.  Patient in need of diagnostic colonoscopy given positive colo Guard test results.  Risk and benefits of colonoscopy were discussed in detail in clinic with the patient today.  After risk benefits discussion, patient voiced understanding risk benefits wished to proceed the near future.  All questions were answered to the patient's satisfaction.    Will obtain preoperative lab test to include CBC, CMP and EKG for review by the Anesthesiologist the day of the procedure prior to induction of the anesthetic agent of choice.    Risks and benefits of endoscopy were discussed in depth in clinic.  From a procedural standpoint, we discuss the benefits of colonoscopy to be finding colon cancers at early stages, including polyps which can be endoscopically resectable, to finding early stage colon cancers which can be better treated with current medical and surgical therapies in order to give patients a longer survival, if found in these early stages.  From a standpoint of risks, the risk of bleeding and perforation of the colon were discussed.  I personally discussed that if complications of bleeding or perforation were to occur, the patient could need as little as a blood transfusion and as much as possible hospital admission, repeat procedure, or even surgery.  During today's discussion of the procedure of colonoscopy with the patient, I  personally jay the patient a picture to assist with counseling.  Total clinic time spent today 45 minutes with greater than half of the time spent in face to face counseling.    Patient instructed that best way to communicate with my office staff is for patient to get on the [facility] patient portal to expedite communication and communication issues that may occur.  Patient was given instructions on how to get on the portal.  I encouraged patient to obtain portal access as well.  Ultimately it is up to the patient to obtain access.  Patient voiced understanding.

## 2020-07-27 NOTE — [employer] PATIENT INSTRUCTIONS
Discharge Summary/Instructions after an Endoscopic Procedure  Patient Name: Robert Nava  Patient MRN: [id number]  Patient YOB: 1933 Monday, July 27, 2020  Jersey Adam MD  RESTRICTIONS:  During your procedure today, you received medications for sedation.  These   medications may affect your judgment, balance and coordination.  Therefore,   for 24 hours, you have the following restrictions:   - DO NOT drive a car, operate machinery, make legal/financial decisions,   sign important papers or drink alcohol.    ACTIVITY:  Today: no heavy lifting, straining or running due to procedural   sedation/anesthesia.  The following day: return to full activity including work.  DIET:  Eat and drink normally unless instructed otherwise.     TREATMENT FOR COMMON SIDE EFFECTS:  - Mild abdominal pain, nausea, belching, bloating or excessive gas:  rest,   eat lightly and use a heating pad.  - Sore Throat: treat with throat lozenges and/or gargle with warm salt   water.  - Because air was used during the procedure, expelling large amounts of air   from your rectum or belching is normal.  - If a bowel prep was taken, you may not have a bowel movement for 1-3 days.    This is normal.  SYMPTOMS TO WATCH FOR AND REPORT TO YOUR PHYSICIAN:  1. Abdominal pain or bloating, other than gas cramps.  2. Chest pain.  3. Back pain.  4. Signs of infection such as: chills or fever occurring within 24 hours   after the procedure.  5. Rectal bleeding, which would show as bright red, maroon, or black stools.   (A tablespoon of blood from the rectum is not serious, especially if   hemorrhoids are present.)  6. Vomiting.  7. Weakness or dizziness.  GO DIRECTLY TO THE NEAREST EMERGENCY ROOM IF YOU HAVE ANY OF THE FOLLOWING:      Difficulty breathing              Chills and/or fever over 101 F   Persistent vomiting and/or vomiting blood   Severe abdominal pain   Severe chest pain   Black, tarry stools   Bleeding- more than one  tablespoon   Any other symptom or condition that you feel may need urgent attention  Your doctor recommends these additional instructions:  If any biopsies were taken, your doctors clinic will contact you in 1 to 2   weeks with any results.  - Discharge patient to home (ambulatory).   - Resume previous diet.   - Continue present medications.   - Await pathology results.   - Return to my office in 2 weeks.  For questions, problems or results please call your physician - Jersey Adam MD at Work:  [phone].  [facility] EMERGENCY ROOM PHONE NUMBER: [phone]  IF A COMPLICATION OR EMERGENCY SITUATION ARISES AND YOU ARE UNABLE TO REACH   YOUR PHYSICIAN - GO DIRECTLY TO THE EMERGENCY ROOM.  MD Jersey Leahy MD  7/27/2020 10:06:56 AM  This report has been verified and signed electronically.  [employer]

## 2020-07-27 NOTE — DISCHARGE INSTRUCTIONS
[facility]   [phone]  [facility]      [phone]    Managing nausea    Some people have an upset stomach after surgery. This is often because of anesthesia, pain, or pain medicine, or the stress of surgery. These tips will help you handle nausea and eat healthy foods as you get better. If you were on a special food plan before surgery, ask your healthcare provider if you should follow it while you get better. These tips may help:  · Do not push yourself to eat. Your body will tell you when to eat and how much.  · Start off with clear liquids and soup. They are easier to digest.  · Next try semi-solid foods, such as mashed potatoes, applesauce, and gelatin, as you feel ready.  · Slowly move to solid foods. Dont eat fatty, rich, or spicy foods at first.  · Do not force yourself to have 3 large meals a day. Instead eat smaller amounts more often.  · Take pain medicines with a small amount of solid food, such as crackers or toast, to avoid nausea.

## 2020-07-27 NOTE — ANESTHESIA POSTPROCEDURE EVALUATION
Anesthesia Post Evaluation    Patient: Robert Nava    Procedure(s) Performed: Procedure(s) (LRB):  COLONOSCOPY (N/A)    Final Anesthesia Type: general    Patient location during evaluation: PACU  Patient participation: Yes- Able to Participate  Level of consciousness: awake and alert  Post-procedure vital signs: reviewed and stable  Pain management: adequate  Airway patency: patent    PONV status at discharge: No PONV  Anesthetic complications: no      Cardiovascular status: blood pressure returned to baseline  Respiratory status: unassisted  Hydration status: euvolemic  Follow-up not needed.          Vitals Value Taken Time   /84 07/27/20 1047   Temp 36.6 °C (97.9 °F) 07/27/20 1007   Pulse 72 07/27/20 1046   Resp 22 07/27/20 1046   SpO2 95 % 07/27/20 1046   Vitals shown include unvalidated device data.      Event Time   Out of Recovery 10:35:00         Pain/Nancy Score: Nancy Score: 9 (7/27/2020 10:20 AM)  Modified Nancy Score: 19 (7/27/2020 10:50 AM)

## 2020-07-30 LAB
FINAL PATHOLOGIC DIAGNOSIS: NORMAL
GROSS: NORMAL

## 2020-08-12 ENCOUNTER — OFFICE VISIT (OUTPATIENT)
Dept: SURGERY | Facility: CLINIC | Age: 85
End: 2020-08-12
Payer: MEDICARE

## 2020-08-12 VITALS
DIASTOLIC BLOOD PRESSURE: 66 MMHG | HEART RATE: 84 BPM | SYSTOLIC BLOOD PRESSURE: 121 MMHG | OXYGEN SATURATION: 96 % | RESPIRATION RATE: 14 BRPM | HEIGHT: 71 IN | TEMPERATURE: 98 F | WEIGHT: 186.94 LBS | BODY MASS INDEX: 26.17 KG/M2

## 2020-08-12 DIAGNOSIS — D12.6 TUBULAR ADENOMA OF COLON: Primary | ICD-10-CM

## 2020-08-12 DIAGNOSIS — K57.92 DIVERTICULITIS: ICD-10-CM

## 2020-08-12 PROCEDURE — 1126F AMNT PAIN NOTED NONE PRSNT: CPT | Mod: S$GLB,,, | Performed by: SURGERY

## 2020-08-12 PROCEDURE — 1159F PR MEDICATION LIST DOCUMENTED IN MEDICAL RECORD: ICD-10-PCS | Mod: S$GLB,,, | Performed by: SURGERY

## 2020-08-12 PROCEDURE — 99213 OFFICE O/P EST LOW 20 MIN: CPT | Mod: S$GLB,,, | Performed by: SURGERY

## 2020-08-12 PROCEDURE — 1159F MED LIST DOCD IN RCRD: CPT | Mod: S$GLB,,, | Performed by: SURGERY

## 2020-08-12 PROCEDURE — 1101F PT FALLS ASSESS-DOCD LE1/YR: CPT | Mod: CPTII,S$GLB,, | Performed by: SURGERY

## 2020-08-12 PROCEDURE — 1126F PR PAIN SEVERITY QUANTIFIED, NO PAIN PRESENT: ICD-10-PCS | Mod: S$GLB,,, | Performed by: SURGERY

## 2020-08-12 PROCEDURE — 1101F PR PT FALLS ASSESS DOC 0-1 FALLS W/OUT INJ PAST YR: ICD-10-PCS | Mod: CPTII,S$GLB,, | Performed by: SURGERY

## 2020-08-12 PROCEDURE — 99213 PR OFFICE/OUTPT VISIT, EST, LEVL III, 20-29 MIN: ICD-10-PCS | Mod: S$GLB,,, | Performed by: SURGERY

## 2020-08-12 NOTE — LETTER
August 12, 2020      Robert Martinez MD  [address]  [place] [address]           [facility]  [address] [place] [address]  Phone: [phone]  Fax: [phone]          Patient: Robert Nava   MR Number: [id number]   YOB: 1933   Date of Visit: 8/12/2020       Dear Dr. Robert Martinez:    Thank you for referring Robert Nava to me for evaluation. Attached you will find relevant portions of my assessment and plan of care.    If you have questions, please do not hesitate to call me. I look forward to following Robert Nava along with you.    Sincerely,    Jersey Adam MD    Enclosure  CC:  No Recipients    If you would like to receive this communication electronically, please contact [email or web address] or [phone] to request more information on [facility] [employer] access.    For providers and/or their staff who would like to refer a patient to [facility], please contact us through our one-stop-shop provider referral line, [facility], at [phone].    If you feel you have received this communication in error or would no longer like to receive these types of communications, please e-mail [email or web address]

## 2020-08-12 NOTE — PROGRESS NOTES
"[facility]  Follow-up    Subjective:     Chief Complaint:  Follow-up after colonoscopy, review pathology.    HPI:  Robert Nava is a 87 y.o. male presents today for follow-up examination after colonoscopy.  Patient since colonoscopy is done well.  Colonoscopy done for a positive colo Guard.  Patient since colonoscopy has had no issues or complaints.  Colonoscopy revealed 2 colon polyps.  Negative for dysplasia or malignancy.  One consistent with tubular adenoma.  One consistent with hyperplastic polyp.  Patient also with diverticulosis.  No issues or complaints today.    Review of Systems   Constitutional: Negative for appetite change, chills and fever.   HENT: Negative for congestion, dental problem and drooling.    Eyes: Negative for photophobia, discharge and itching.   Respiratory: Negative for apnea and chest tightness.    Cardiovascular: Negative for chest pain, palpitations and leg swelling.   Gastrointestinal: Negative for abdominal distention and abdominal pain.   Endocrine: Negative for cold intolerance and heat intolerance.   Genitourinary: Negative for difficulty urinating and dysuria.   Musculoskeletal: Negative for arthralgias and back pain.   Skin: Negative for color change and pallor.   Neurological: Negative for dizziness, facial asymmetry and headaches.   Hematological: Negative for adenopathy. Does not bruise/bleed easily.   Psychiatric/Behavioral: Negative for agitation, behavioral problems and confusion.       Objective:      Vitals:    08/12/20 0909   BP: 121/66   BP Location: Left arm   Patient Position: Sitting   BP Method: Large (Automatic)   Pulse: 84   Resp: 14   Temp: 97.8 °F (36.6 °C)   SpO2: 96%   Weight: 84.8 kg (186 lb 15.2 oz)   Height: 5' 11" (1.803 m)     Physical Exam  Constitutional:       Appearance: He is well-developed. He is not diaphoretic.   HENT:      Head: Normocephalic and atraumatic.   Eyes:      Pupils: Pupils are equal, round, and reactive to " light.   Neck:      Musculoskeletal: Normal range of motion and neck supple.      Thyroid: No thyromegaly.   Cardiovascular:      Rate and Rhythm: Normal rate and regular rhythm.      Heart sounds: No murmur.   Pulmonary:      Effort: Pulmonary effort is normal. No respiratory distress.      Breath sounds: Normal breath sounds.   Abdominal:      General: Bowel sounds are normal. There is no distension.      Palpations: Abdomen is soft.      Tenderness: There is no abdominal tenderness.   Musculoskeletal: Normal range of motion.   Skin:     General: Skin is warm.      Capillary Refill: Capillary refill takes less than 2 seconds.      Findings: No erythema or rash.   Neurological:      Mental Status: He is alert and oriented to person, place, and time.      Cranial Nerves: No cranial nerve deficit.       Colonoscopy report reviewed.  Pathology report reviewed.  Tubular adenoma colon.  Hyperplastic polyp colon.  Diverticulosis noted.     Assessment:       1. Tubular adenoma of colon    2. Diverticulitis        Plan:   Tubular adenoma of colon    Diverticulitis        Medical Decision Making/Counseling:  Patient with tubular adenoma the colon.  Resected during colonoscopy.  Hyperplastic polyp the colon.  Resected during colonoscopy.  No issues since colonoscopy.  At the patient's age do not recommend repeat colonoscopy for screening/surveillance needs.    Diverticulosis of the colon.  Patient with diverticulosis.  Patient informed of the diagnosis of diverticulosis as well as the possibilities of diverticulitis and diverticular bleeding.  Patient informed of the red flag signs associated with these diagnosis.  High-fiber diet recommended.  Pamphlet given to patient regards to diverticulosis well as high-fiber diet.  Patient voiced understanding.    Follow up:  As needed    Patient instructed that best way to communicate with my office staff is for patient to get on the [facility] patient portal to expedite  communication and communication issues that may occur.  Patient was given instructions on how to get on the portal.  I encouraged patient to obtain portal access as well.  Ultimately it is up to the patient to obtain access.  Patient voiced understanding.

## 2021-03-03 ENCOUNTER — HOSPITAL ENCOUNTER (EMERGENCY)
Facility: HOSPITAL | Age: 86
Discharge: HOME | End: 2021-03-03
Attending: INTERNAL MEDICINE
Payer: MEDICARE

## 2021-03-03 VITALS
TEMPERATURE: 98 F | WEIGHT: 191 LBS | DIASTOLIC BLOOD PRESSURE: 76 MMHG | BODY MASS INDEX: 26.74 KG/M2 | HEART RATE: 86 BPM | SYSTOLIC BLOOD PRESSURE: 139 MMHG | RESPIRATION RATE: 18 BRPM | HEIGHT: 71 IN | OXYGEN SATURATION: 98 %

## 2021-03-03 DIAGNOSIS — F32.9 REACTIVE DEPRESSION: ICD-10-CM

## 2021-03-03 DIAGNOSIS — F10.920 ALCOHOLIC INTOXICATION WITHOUT COMPLICATION: Primary | ICD-10-CM

## 2021-03-03 LAB
ALBUMIN SERPL BCP-MCNC: 3.7 G/DL (ref 3.5–5.2)
ALBUMIN SERPL BCP-MCNC: 3.7 G/DL (ref 3.5–5.2)
ALP SERPL-CCNC: 43 U/L (ref 55–135)
ALP SERPL-CCNC: 43 U/L (ref 55–135)
ALT SERPL W/O P-5'-P-CCNC: 14 U/L (ref 10–44)
ALT SERPL W/O P-5'-P-CCNC: 14 U/L (ref 10–44)
AMMONIA PLAS-SCNC: 10 UMOL/L (ref 10–50)
AMPHET+METHAMPHET UR QL: NEGATIVE
ANION GAP SERPL CALC-SCNC: 10 MMOL/L (ref 8–16)
APAP SERPL-MCNC: <10 UG/ML (ref 10–20)
AST SERPL-CCNC: 18 U/L (ref 10–40)
AST SERPL-CCNC: 18 U/L (ref 10–40)
BACTERIA #/AREA URNS HPF: ABNORMAL /HPF
BARBITURATES UR QL SCN>200 NG/ML: NEGATIVE
BASOPHILS # BLD AUTO: 0.03 K/UL (ref 0–0.2)
BASOPHILS NFR BLD: 0.3 % (ref 0–1.9)
BENZODIAZ UR QL SCN>200 NG/ML: NEGATIVE
BILIRUB DIRECT SERPL-MCNC: 0.1 MG/DL (ref 0.1–0.3)
BILIRUB SERPL-MCNC: 0.4 MG/DL (ref 0.1–1)
BILIRUB SERPL-MCNC: 0.4 MG/DL (ref 0.1–1)
BILIRUB UR QL STRIP: NEGATIVE
BUN SERPL-MCNC: 19 MG/DL (ref 8–23)
BZE UR QL SCN: NEGATIVE
CALCIUM SERPL-MCNC: 8.9 MG/DL (ref 8.7–10.5)
CANNABINOIDS UR QL SCN: NEGATIVE
CHLORIDE SERPL-SCNC: 107 MMOL/L (ref 95–110)
CLARITY UR: CLEAR
CO2 SERPL-SCNC: 23 MMOL/L (ref 23–29)
COLOR UR: YELLOW
CREAT SERPL-MCNC: 1.2 MG/DL (ref 0.5–1.4)
CREAT UR-MCNC: 103.7 MG/DL (ref 23–375)
DIFFERENTIAL METHOD: ABNORMAL
EOSINOPHIL # BLD AUTO: 0.1 K/UL (ref 0–0.5)
EOSINOPHIL NFR BLD: 0.6 % (ref 0–8)
ERYTHROCYTE [DISTWIDTH] IN BLOOD BY AUTOMATED COUNT: 13.7 % (ref 11.5–14.5)
EST. GFR  (AFRICAN AMERICAN): >60 ML/MIN/1.73 M^2
EST. GFR  (NON AFRICAN AMERICAN): 53.7 ML/MIN/1.73 M^2
ETHANOL SERPL-MCNC: 154 MG/DL
GLUCOSE SERPL-MCNC: 97 MG/DL (ref 70–110)
GLUCOSE UR QL STRIP: NEGATIVE
HCT VFR BLD AUTO: 36 % (ref 40–54)
HGB BLD-MCNC: 12 G/DL (ref 14–18)
HGB UR QL STRIP: NEGATIVE
HYALINE CASTS #/AREA URNS LPF: 0 /LPF
IMM GRANULOCYTES # BLD AUTO: 0.08 K/UL (ref 0–0.04)
IMM GRANULOCYTES NFR BLD AUTO: 0.8 % (ref 0–0.5)
KETONES UR QL STRIP: NEGATIVE
LEUKOCYTE ESTERASE UR QL STRIP: ABNORMAL
LYMPHOCYTES # BLD AUTO: 2 K/UL (ref 1–4.8)
LYMPHOCYTES NFR BLD: 21.2 % (ref 18–48)
MCH RBC QN AUTO: 29.1 PG (ref 27–31)
MCHC RBC AUTO-ENTMCNC: 33.3 G/DL (ref 32–36)
MCV RBC AUTO: 87 FL (ref 82–98)
MICROSCOPIC COMMENT: ABNORMAL
MONOCYTES # BLD AUTO: 0.5 K/UL (ref 0.3–1)
MONOCYTES NFR BLD: 4.8 % (ref 4–15)
NEUTROPHILS # BLD AUTO: 6.8 K/UL (ref 1.8–7.7)
NEUTROPHILS NFR BLD: 72.3 % (ref 38–73)
NITRITE UR QL STRIP: NEGATIVE
NRBC BLD-RTO: 0 /100 WBC
OPIATES UR QL SCN: NEGATIVE
PCP UR QL SCN>25 NG/ML: NEGATIVE
PH UR STRIP: 6 [PH] (ref 5–8)
PLATELET # BLD AUTO: 342 K/UL (ref 150–350)
PMV BLD AUTO: 8.4 FL (ref 9.2–12.9)
POTASSIUM SERPL-SCNC: 3.5 MMOL/L (ref 3.5–5.1)
PROT SERPL-MCNC: 6.8 G/DL (ref 6–8.4)
PROT SERPL-MCNC: 6.8 G/DL (ref 6–8.4)
PROT UR QL STRIP: ABNORMAL
RBC # BLD AUTO: 4.13 M/UL (ref 4.6–6.2)
RBC #/AREA URNS HPF: 0 /HPF (ref 0–4)
SARS-COV-2 RDRP RESP QL NAA+PROBE: NEGATIVE
SODIUM SERPL-SCNC: 140 MMOL/L (ref 136–145)
SP GR UR STRIP: 1.01 (ref 1–1.03)
TOXICOLOGY INFORMATION: NORMAL
TSH SERPL DL<=0.005 MIU/L-ACNC: 0.88 UIU/ML (ref 0.34–5.6)
URN SPEC COLLECT METH UR: ABNORMAL
UROBILINOGEN UR STRIP-ACNC: NEGATIVE EU/DL
WBC # BLD AUTO: 9.45 K/UL (ref 3.9–12.7)
WBC #/AREA URNS HPF: 20 /HPF (ref 0–5)

## 2021-03-03 PROCEDURE — 99283 EMERGENCY DEPT VISIT LOW MDM: CPT

## 2021-03-03 PROCEDURE — U0002 COVID-19 LAB TEST NON-CDC: HCPCS | Performed by: NURSE PRACTITIONER

## 2021-03-03 PROCEDURE — 36415 COLL VENOUS BLD VENIPUNCTURE: CPT | Performed by: INTERNAL MEDICINE

## 2021-03-03 PROCEDURE — 80076 HEPATIC FUNCTION PANEL: CPT | Performed by: INTERNAL MEDICINE

## 2021-03-03 PROCEDURE — 87086 URINE CULTURE/COLONY COUNT: CPT | Performed by: NURSE PRACTITIONER

## 2021-03-03 PROCEDURE — 85025 COMPLETE CBC W/AUTO DIFF WBC: CPT | Performed by: NURSE PRACTITIONER

## 2021-03-03 PROCEDURE — 87088 URINE BACTERIA CULTURE: CPT | Performed by: NURSE PRACTITIONER

## 2021-03-03 PROCEDURE — 80307 DRUG TEST PRSMV CHEM ANLYZR: CPT | Performed by: NURSE PRACTITIONER

## 2021-03-03 PROCEDURE — 82077 ASSAY SPEC XCP UR&BREATH IA: CPT | Performed by: NURSE PRACTITIONER

## 2021-03-03 PROCEDURE — 82140 ASSAY OF AMMONIA: CPT | Performed by: INTERNAL MEDICINE

## 2021-03-03 PROCEDURE — 80143 DRUG ASSAY ACETAMINOPHEN: CPT | Performed by: NURSE PRACTITIONER

## 2021-03-03 PROCEDURE — 87186 SC STD MICRODIL/AGAR DIL: CPT | Performed by: NURSE PRACTITIONER

## 2021-03-03 PROCEDURE — 87077 CULTURE AEROBIC IDENTIFY: CPT | Performed by: NURSE PRACTITIONER

## 2021-03-03 PROCEDURE — 84443 ASSAY THYROID STIM HORMONE: CPT | Performed by: NURSE PRACTITIONER

## 2021-03-03 PROCEDURE — 81000 URINALYSIS NONAUTO W/SCOPE: CPT | Mod: 59 | Performed by: NURSE PRACTITIONER

## 2021-03-03 PROCEDURE — 80053 COMPREHEN METABOLIC PANEL: CPT | Performed by: NURSE PRACTITIONER

## 2021-03-06 LAB — BACTERIA UR CULT: ABNORMAL

## 2021-03-13 ENCOUNTER — TELEPHONE (OUTPATIENT)
Dept: EMERGENCY MEDICINE | Facility: HOSPITAL | Age: 86
End: 2021-03-13

## 2021-04-19 PROBLEM — I49.9 IRREGULAR HEART RHYTHM: Status: ACTIVE | Noted: 2021-04-19

## 2021-04-19 PROBLEM — Z71.89 ADVICE GIVEN ABOUT COVID-19 VIRUS INFECTION: Status: ACTIVE | Noted: 2021-04-19

## 2021-04-22 PROBLEM — J00 ACUTE RHINITIS: Status: ACTIVE | Noted: 2021-04-22

## 2021-06-24 ENCOUNTER — HOSPITAL ENCOUNTER (EMERGENCY)
Facility: HOSPITAL | Age: 86
Discharge: HOME | End: 2021-06-24
Payer: MEDICARE

## 2021-06-24 VITALS
SYSTOLIC BLOOD PRESSURE: 135 MMHG | WEIGHT: 187 LBS | TEMPERATURE: 100 F | RESPIRATION RATE: 22 BRPM | OXYGEN SATURATION: 93 % | DIASTOLIC BLOOD PRESSURE: 75 MMHG | HEIGHT: 71 IN | BODY MASS INDEX: 26.18 KG/M2 | HEART RATE: 104 BPM

## 2021-06-24 DIAGNOSIS — R10.32 LEFT LOWER QUADRANT ABDOMINAL PAIN: Primary | ICD-10-CM

## 2021-06-24 PROCEDURE — 74176 CT ABD & PELVIS W/O CONTRAST: CPT | Mod: 26,,, | Performed by: RADIOLOGY

## 2021-06-24 PROCEDURE — 74176 CT RENAL STONE STUDY ABD PELVIS WO: ICD-10-PCS | Mod: 26,,, | Performed by: RADIOLOGY

## 2021-06-24 PROCEDURE — 74176 CT ABD & PELVIS W/O CONTRAST: CPT | Mod: TC

## 2021-06-24 PROCEDURE — 25000003 PHARM REV CODE 250: Performed by: NURSE PRACTITIONER

## 2021-06-24 PROCEDURE — 99284 EMERGENCY DEPT VISIT MOD MDM: CPT | Mod: 25

## 2021-06-24 RX ORDER — CIPROFLOXACIN 250 MG/1
500 TABLET, FILM COATED ORAL
Status: COMPLETED | OUTPATIENT
Start: 2021-06-24 | End: 2021-06-24

## 2021-06-24 RX ORDER — CIPROFLOXACIN 500 MG/1
500 TABLET ORAL 2 TIMES DAILY
Qty: 20 TABLET | Refills: 0 | Status: SHIPPED | OUTPATIENT
Start: 2021-06-24 | End: 2021-07-04

## 2021-06-24 RX ORDER — METRONIDAZOLE 250 MG/1
500 TABLET ORAL
Status: COMPLETED | OUTPATIENT
Start: 2021-06-24 | End: 2021-06-24

## 2021-06-24 RX ORDER — METRONIDAZOLE 500 MG/1
500 TABLET ORAL EVERY 12 HOURS
Qty: 14 TABLET | Refills: 0 | Status: SHIPPED | OUTPATIENT
Start: 2021-06-24 | End: 2021-07-01

## 2021-06-24 RX ADMIN — CIPROFLOXACIN HYDROCHLORIDE 500 MG: 250 TABLET, FILM COATED ORAL at 07:06

## 2021-06-24 RX ADMIN — METRONIDAZOLE 500 MG: 250 TABLET ORAL at 07:06

## 2021-11-02 ENCOUNTER — TELEPHONE (OUTPATIENT)
Dept: PODIATRY | Facility: CLINIC | Age: 86
End: 2021-11-02
Payer: MEDICARE

## 2022-10-05 ENCOUNTER — TELEPHONE (OUTPATIENT)
Dept: FAMILY MEDICINE | Facility: CLINIC | Age: 87
End: 2022-10-05
Payer: MEDICARE

## 2022-10-05 NOTE — TELEPHONE ENCOUNTER
----- Message from Ava Burkett sent at 10/5/2022 12:08 PM CDT -----  Type:  Sooner Apoointment Request    Caller is requesting a sooner appointment.  Caller declined first available appointment listed below.  Caller will not accept being placed on the waitlist and is requesting a message be sent to doctor.  Name of Caller: Robert Nava     When is the first available appointment? No available appointments     Symptoms: Est care Annual  visit     Would the patient rather a call back or a response via [facility]?  Call back    Best Call Back Number:[phone] (mobile)     Additional Information:

## 2022-10-17 ENCOUNTER — TELEPHONE (OUTPATIENT)
Dept: FAMILY MEDICINE | Facility: CLINIC | Age: 87
End: 2022-10-17
Payer: MEDICARE

## 2022-10-18 ENCOUNTER — LAB VISIT (OUTPATIENT)
Dept: LAB | Facility: CLINIC | Age: 87
End: 2022-10-18
Payer: MEDICARE

## 2022-10-18 ENCOUNTER — OFFICE VISIT (OUTPATIENT)
Dept: FAMILY MEDICINE | Facility: CLINIC | Age: 87
End: 2022-10-18
Payer: MEDICARE

## 2022-10-18 VITALS
SYSTOLIC BLOOD PRESSURE: 112 MMHG | HEIGHT: 71 IN | TEMPERATURE: 98 F | WEIGHT: 187.81 LBS | BODY MASS INDEX: 26.29 KG/M2 | DIASTOLIC BLOOD PRESSURE: 62 MMHG | HEART RATE: 100 BPM | OXYGEN SATURATION: 96 %

## 2022-10-18 DIAGNOSIS — E78.2 MIXED HYPERLIPIDEMIA: ICD-10-CM

## 2022-10-18 DIAGNOSIS — N18.31 STAGE 3A CHRONIC KIDNEY DISEASE: Primary | ICD-10-CM

## 2022-10-18 DIAGNOSIS — N18.31 STAGE 3A CHRONIC KIDNEY DISEASE: ICD-10-CM

## 2022-10-18 DIAGNOSIS — C61 PROSTATE CANCER: ICD-10-CM

## 2022-10-18 DIAGNOSIS — I10 PRIMARY HYPERTENSION: ICD-10-CM

## 2022-10-18 DIAGNOSIS — C40.22 MALIGNANT NEOPLASM OF LONG BONE OF LEFT LOWER EXTREMITY: ICD-10-CM

## 2022-10-18 LAB
ALBUMIN SERPL BCP-MCNC: 4 G/DL (ref 3.5–5.2)
ALP SERPL-CCNC: 47 U/L (ref 55–135)
ALT SERPL W/O P-5'-P-CCNC: 9 U/L (ref 10–44)
ANION GAP SERPL CALC-SCNC: 12 MMOL/L (ref 8–16)
AST SERPL-CCNC: 14 U/L (ref 10–40)
BASOPHILS # BLD AUTO: 0.04 K/UL (ref 0–0.2)
BASOPHILS NFR BLD: 0.4 % (ref 0–1.9)
BILIRUB SERPL-MCNC: 0.5 MG/DL (ref 0.1–1)
BUN SERPL-MCNC: 28 MG/DL (ref 8–23)
CALCIUM SERPL-MCNC: 9.6 MG/DL (ref 8.7–10.5)
CHLORIDE SERPL-SCNC: 107 MMOL/L (ref 95–110)
CO2 SERPL-SCNC: 24 MMOL/L (ref 23–29)
CREAT SERPL-MCNC: 1.6 MG/DL (ref 0.5–1.4)
DIFFERENTIAL METHOD: ABNORMAL
EOSINOPHIL # BLD AUTO: 0.1 K/UL (ref 0–0.5)
EOSINOPHIL NFR BLD: 0.7 % (ref 0–8)
ERYTHROCYTE [DISTWIDTH] IN BLOOD BY AUTOMATED COUNT: 13.7 % (ref 11.5–14.5)
EST. GFR  (NO RACE VARIABLE): 41 ML/MIN/1.73 M^2
GLUCOSE SERPL-MCNC: 106 MG/DL (ref 70–110)
HCT VFR BLD AUTO: 38 % (ref 40–54)
HGB BLD-MCNC: 12.5 G/DL (ref 14–18)
IMM GRANULOCYTES # BLD AUTO: 0.09 K/UL (ref 0–0.04)
IMM GRANULOCYTES NFR BLD AUTO: 0.8 % (ref 0–0.5)
LYMPHOCYTES # BLD AUTO: 1.6 K/UL (ref 1–4.8)
LYMPHOCYTES NFR BLD: 14.5 % (ref 18–48)
MCH RBC QN AUTO: 30.3 PG (ref 27–31)
MCHC RBC AUTO-ENTMCNC: 32.9 G/DL (ref 32–36)
MCV RBC AUTO: 92 FL (ref 82–98)
MONOCYTES # BLD AUTO: 0.9 K/UL (ref 0.3–1)
MONOCYTES NFR BLD: 7.8 % (ref 4–15)
NEUTROPHILS # BLD AUTO: 8.3 K/UL (ref 1.8–7.7)
NEUTROPHILS NFR BLD: 75.8 % (ref 38–73)
NRBC BLD-RTO: 0 /100 WBC
PLATELET # BLD AUTO: 365 K/UL (ref 150–450)
PMV BLD AUTO: 9.5 FL (ref 9.2–12.9)
POTASSIUM SERPL-SCNC: 4.6 MMOL/L (ref 3.5–5.1)
PROT SERPL-MCNC: 7.2 G/DL (ref 6–8.4)
RBC # BLD AUTO: 4.12 M/UL (ref 4.6–6.2)
SODIUM SERPL-SCNC: 143 MMOL/L (ref 136–145)
WBC # BLD AUTO: 10.95 K/UL (ref 3.9–12.7)

## 2022-10-18 PROCEDURE — 99214 PR OFFICE/OUTPT VISIT, EST, LEVL IV, 30-39 MIN: ICD-10-PCS | Mod: S$GLB,,,

## 2022-10-18 PROCEDURE — 99999 PR PBB SHADOW E&M-EST. PATIENT-LVL III: CPT | Mod: PBBFAC,,,

## 2022-10-18 PROCEDURE — 1159F PR MEDICATION LIST DOCUMENTED IN MEDICAL RECORD: ICD-10-PCS | Mod: CPTII,S$GLB,,

## 2022-10-18 PROCEDURE — 1125F PR PAIN SEVERITY QUANTIFIED, PAIN PRESENT: ICD-10-PCS | Mod: CPTII,S$GLB,,

## 2022-10-18 PROCEDURE — 80053 COMPREHEN METABOLIC PANEL: CPT

## 2022-10-18 PROCEDURE — 85025 COMPLETE CBC W/AUTO DIFF WBC: CPT

## 2022-10-18 PROCEDURE — 99214 OFFICE O/P EST MOD 30 MIN: CPT | Mod: S$GLB,,,

## 2022-10-18 PROCEDURE — 1160F RVW MEDS BY RX/DR IN RCRD: CPT | Mod: CPTII,S$GLB,,

## 2022-10-18 PROCEDURE — 36415 COLL VENOUS BLD VENIPUNCTURE: CPT | Mod: PN,,, | Performed by: STUDENT IN AN ORGANIZED HEALTH CARE EDUCATION/TRAINING PROGRAM

## 2022-10-18 PROCEDURE — 1125F AMNT PAIN NOTED PAIN PRSNT: CPT | Mod: CPTII,S$GLB,,

## 2022-10-18 PROCEDURE — 1101F PT FALLS ASSESS-DOCD LE1/YR: CPT | Mod: CPTII,S$GLB,,

## 2022-10-18 PROCEDURE — 3288F PR FALLS RISK ASSESSMENT DOCUMENTED: ICD-10-PCS | Mod: CPTII,S$GLB,,

## 2022-10-18 PROCEDURE — 1101F PR PT FALLS ASSESS DOC 0-1 FALLS W/OUT INJ PAST YR: ICD-10-PCS | Mod: CPTII,S$GLB,,

## 2022-10-18 PROCEDURE — 36415 PR COLLECTION VENOUS BLOOD,VENIPUNCTURE: ICD-10-PCS | Mod: PN,,, | Performed by: STUDENT IN AN ORGANIZED HEALTH CARE EDUCATION/TRAINING PROGRAM

## 2022-10-18 PROCEDURE — 99999 PR PBB SHADOW E&M-EST. PATIENT-LVL III: ICD-10-PCS | Mod: PBBFAC,,,

## 2022-10-18 PROCEDURE — 3288F FALL RISK ASSESSMENT DOCD: CPT | Mod: CPTII,S$GLB,,

## 2022-10-18 PROCEDURE — 1160F PR REVIEW ALL MEDS BY PRESCRIBER/CLIN PHARMACIST DOCUMENTED: ICD-10-PCS | Mod: CPTII,S$GLB,,

## 2022-10-18 PROCEDURE — 1159F MED LIST DOCD IN RCRD: CPT | Mod: CPTII,S$GLB,,

## 2022-10-18 RX ORDER — GABAPENTIN 300 MG/1
300 CAPSULE ORAL ONCE
COMMUNITY
Start: 2022-07-20 | End: 2022-10-18 | Stop reason: SDUPTHER

## 2022-10-18 RX ORDER — TAMSULOSIN HYDROCHLORIDE 0.4 MG/1
0.4 CAPSULE ORAL NIGHTLY
Qty: 90 CAPSULE | Refills: 1 | Status: SHIPPED | OUTPATIENT
Start: 2022-10-18 | End: 2023-04-18 | Stop reason: SDUPTHER

## 2022-10-18 RX ORDER — SIMVASTATIN 40 MG/1
40 TABLET, FILM COATED ORAL NIGHTLY
Qty: 90 TABLET | Refills: 1 | Status: SHIPPED | OUTPATIENT
Start: 2022-10-18 | End: 2023-04-18 | Stop reason: SDUPTHER

## 2022-10-18 RX ORDER — AMLODIPINE BESYLATE 10 MG/1
10 TABLET ORAL DAILY
Qty: 90 TABLET | Refills: 1 | Status: SHIPPED | OUTPATIENT
Start: 2022-10-18 | End: 2023-04-18 | Stop reason: SDUPTHER

## 2022-10-18 RX ORDER — ENZALUTAMIDE 80 MG/1
80 TABLET ORAL 2 TIMES DAILY
COMMUNITY
End: 2023-04-25

## 2022-10-18 RX ORDER — GABAPENTIN 300 MG/1
300 CAPSULE ORAL 3 TIMES DAILY
Qty: 270 CAPSULE | Refills: 1 | Status: SHIPPED | OUTPATIENT
Start: 2022-10-18 | End: 2023-04-18 | Stop reason: SDUPTHER

## 2022-10-18 NOTE — PATIENT INSTRUCTIONS

## 2022-10-18 NOTE — PROGRESS NOTES
Subjective:       Patient ID: Robert Nava is a 89 y.o. male.    Chief Complaint: Hypertension (Taking his medication as directed w/o any problems or concerns ), Hyperlipidemia (Taking his medication as directed w/o any problems or concerns ), and Leg Pain (States neuronin does help. Rx renewal )    Patient presents to the clinic to establish care. Patient was a previous patient of Dr. Moreno was has passed away.     Patient Active Problem List:     Positive colorectal cancer screening using Cologuard test     Irregular heart rhythm     Acute rhinitis     Prostate cancer     Hyperlipidemia     Hypertension     Bone cancer    Follows with Dr. Mariee for prostate and bone cancer-taking Xtandi.     Denies any falls.     Hypertension-  BP Readings from Last 3 Encounters:  10/18/22 : 112/62  06/24/21 : 135/75  04/22/21 : 136/84  Taking Norvasc as prescribed.     Hyperlipidemia-  Taking Zocor as prescribed without any reported side effects.     Takes Neurontin for bilateral leg pain and would like to see about increasing it. States he has leg pain all of the time.     Has no other complaints or concerns at this time.     Patient educated on plan of care, verbalized understanding.              Review of Systems   Constitutional:  Negative for activity change, appetite change, chills, diaphoresis and fever.   HENT:  Negative for congestion, ear pain, postnasal drip, sinus pressure, sneezing and sore throat.    Eyes:  Negative for pain, discharge, redness and itching.   Respiratory:  Negative for apnea, cough, chest tightness, shortness of breath and wheezing.    Cardiovascular:  Negative for chest pain and leg swelling.   Gastrointestinal:  Negative for abdominal distention, abdominal pain, constipation, diarrhea, nausea and vomiting.   Genitourinary:  Negative for difficulty urinating, dysuria, flank pain and frequency.   Musculoskeletal:  Positive for arthralgias, back pain and myalgias.        Left hip pain     Skin:  Negative for color change, rash and wound.   Neurological:  Negative for dizziness.   All other systems reviewed and are negative.    Patient Active Problem List   Diagnosis    Positive colorectal cancer screening using Cologuard test    Irregular heart rhythm    Acute rhinitis    Prostate cancer    Hyperlipidemia    Hypertension    Bone cancer       Objective:      Physical Exam  Vitals and nursing note reviewed.   Constitutional:       Appearance: Normal appearance. He is not ill-appearing.   HENT:      Head: Normocephalic and atraumatic.      Nose: Nose normal.   Eyes:      General: Lids are normal.   Cardiovascular:      Rate and Rhythm: Normal rate and regular rhythm.      Pulses: Normal pulses.      Heart sounds: Normal heart sounds.   Pulmonary:      Effort: Pulmonary effort is normal. No tachypnea or respiratory distress.      Breath sounds: Normal breath sounds. No wheezing.   Abdominal:      General: Bowel sounds are normal. There is no distension.      Palpations: Abdomen is soft.      Tenderness: There is no abdominal tenderness.   Musculoskeletal:         General: Normal range of motion.      Cervical back: Full passive range of motion without pain and normal range of motion.      Left lower leg: No edema.   Skin:     General: Skin is warm and dry.   Neurological:      Mental Status: He is alert and oriented to person, place, and time.   Psychiatric:         Mood and Affect: Mood normal.         Behavior: Behavior normal.       Lab Results   Component Value Date    WBC 9.45 03/03/2021    HGB 12.0 (L) 03/03/2021    HCT 36.0 (L) 03/03/2021     03/03/2021    CHOL 167 04/16/2018    TRIG 30 04/16/2018     (H) 04/16/2018    ALT 14 03/03/2021    ALT 14 03/03/2021    AST 18 03/03/2021    AST 18 03/03/2021     03/03/2021    K 3.5 03/03/2021     03/03/2021    CREATININE 1.2 03/03/2021    BUN 19 03/03/2021    CO2 23 03/03/2021    TSH 0.879 03/03/2021    HGBA1C 5.3  "04/16/2018     The ASCVD Risk score (Lore SPEARS, et al., 2019) failed to calculate for the following reasons:    The 2019 ASCVD risk score is only valid for ages 40 to 79  Visit Vitals  /62 (BP Location: Left arm, Patient Position: Sitting, BP Method: Medium (Manual))   Pulse 100   Temp 98.4 °F (36.9 °C) (Temporal)   Ht 5' 11" (1.803 m)   Wt 85.2 kg (187 lb 13.3 oz)   SpO2 96%   BMI 26.20 kg/m²      Assessment:       1. Stage 3a chronic kidney disease    2. Prostate cancer    3. Mixed hyperlipidemia    4. Primary hypertension    5. 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 Stage 3a chronic kidney disease  -     CBC auto differential; Future; Expected date: 10/18/2022  -     Comprehensive Metabolic Panel; Future; Expected date: 10/18/2022   - Stable   - Continue current plan of care   - Follow up with PCP    2. Prostate cancer  -     tamsulosin (FLOMAX) 0.4 mg Cap; Take 1 capsule (0.4 mg total) by mouth every evening.  Dispense: 90 capsule; Refill: 1   - Stable   - Continue current plan of care   - Follow up with Dr. Mariee    3. Mixed hyperlipidemia  -     simvastatin (ZOCOR) 40 MG tablet; Take 1 tablet (40 mg total) by mouth every evening.  Dispense: 90 tablet; Refill: 1   - Stable   - Continue current plan of care   - Follow up with PCP    4. Primary hypertension  -     amLODIPine (NORVASC) 10 MG tablet; Take 1 tablet (10 mg total) by mouth once daily.  Dispense: 90 tablet; Refill: 1  -     Comprehensive Metabolic Panel; Future; Expected date: 10/18/2022   - Stable   - Continue current plan of care   - Follow up with PCP    5. 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 Mariee        Other orders  -     gabapentin (NEURONTIN) 300 MG capsule; Take 1 capsule (300 mg total) by mouth 3 (three) times daily.  Dispense: 270 capsule; Refill: 1     Follow up in about 3 months (around 1/18/2023), or if " symptoms worsen or fail to improve.      Future Appointments       Date Provider Specialty Appt Notes    11/15/2022 Alejandrina Yadav MD [facility]

## 2022-10-19 NOTE — PROGRESS NOTES
Please contact the patient and let them know that their results were fine and do not require any change in treatment.    His kidney function was a little elevated, he needs to be sure he is drinking enough water.   Thanks,   Renetta Matos NP

## 2023-01-18 ENCOUNTER — OFFICE VISIT (OUTPATIENT)
Dept: FAMILY MEDICINE | Facility: CLINIC | Age: 88
End: 2023-01-18
Payer: MEDICARE

## 2023-01-18 ENCOUNTER — LAB VISIT (OUTPATIENT)
Dept: LAB | Facility: CLINIC | Age: 88
End: 2023-01-18
Payer: MEDICARE

## 2023-01-18 VITALS
HEIGHT: 71 IN | TEMPERATURE: 98 F | HEART RATE: 91 BPM | OXYGEN SATURATION: 96 % | SYSTOLIC BLOOD PRESSURE: 130 MMHG | WEIGHT: 195.56 LBS | BODY MASS INDEX: 27.38 KG/M2 | DIASTOLIC BLOOD PRESSURE: 80 MMHG

## 2023-01-18 DIAGNOSIS — C61 PROSTATE CANCER: ICD-10-CM

## 2023-01-18 DIAGNOSIS — C40.22 MALIGNANT NEOPLASM OF LONG BONE OF LEFT LOWER EXTREMITY: ICD-10-CM

## 2023-01-18 DIAGNOSIS — E78.2 MIXED HYPERLIPIDEMIA: ICD-10-CM

## 2023-01-18 DIAGNOSIS — I10 PRIMARY HYPERTENSION: ICD-10-CM

## 2023-01-18 DIAGNOSIS — I10 PRIMARY HYPERTENSION: Primary | ICD-10-CM

## 2023-01-18 LAB
ALBUMIN SERPL BCP-MCNC: 4 G/DL (ref 3.5–5.2)
ALP SERPL-CCNC: 38 U/L (ref 55–135)
ALT SERPL W/O P-5'-P-CCNC: 12 U/L (ref 10–44)
ANION GAP SERPL CALC-SCNC: 8 MMOL/L (ref 8–16)
AST SERPL-CCNC: 16 U/L (ref 10–40)
BASOPHILS # BLD AUTO: 0.07 K/UL (ref 0–0.2)
BASOPHILS NFR BLD: 0.8 % (ref 0–1.9)
BILIRUB SERPL-MCNC: 0.4 MG/DL (ref 0.1–1)
BUN SERPL-MCNC: 20 MG/DL (ref 8–23)
CALCIUM SERPL-MCNC: 9.2 MG/DL (ref 8.7–10.5)
CHLORIDE SERPL-SCNC: 109 MMOL/L (ref 95–110)
CO2 SERPL-SCNC: 25 MMOL/L (ref 23–29)
CREAT SERPL-MCNC: 1.4 MG/DL (ref 0.5–1.4)
DIFFERENTIAL METHOD: ABNORMAL
EOSINOPHIL # BLD AUTO: 0.3 K/UL (ref 0–0.5)
EOSINOPHIL NFR BLD: 2.7 % (ref 0–8)
ERYTHROCYTE [DISTWIDTH] IN BLOOD BY AUTOMATED COUNT: 13.6 % (ref 11.5–14.5)
EST. GFR  (NO RACE VARIABLE): 48 ML/MIN/1.73 M^2
GLUCOSE SERPL-MCNC: 130 MG/DL (ref 70–110)
HCT VFR BLD AUTO: 35.2 % (ref 40–54)
HGB BLD-MCNC: 11.6 G/DL (ref 14–18)
IMM GRANULOCYTES # BLD AUTO: 0.11 K/UL (ref 0–0.04)
IMM GRANULOCYTES NFR BLD AUTO: 1.2 % (ref 0–0.5)
LYMPHOCYTES # BLD AUTO: 1.7 K/UL (ref 1–4.8)
LYMPHOCYTES NFR BLD: 18.4 % (ref 18–48)
MCH RBC QN AUTO: 30.4 PG (ref 27–31)
MCHC RBC AUTO-ENTMCNC: 33 G/DL (ref 32–36)
MCV RBC AUTO: 92 FL (ref 82–98)
MONOCYTES # BLD AUTO: 0.8 K/UL (ref 0.3–1)
MONOCYTES NFR BLD: 8.3 % (ref 4–15)
NEUTROPHILS # BLD AUTO: 6.3 K/UL (ref 1.8–7.7)
NEUTROPHILS NFR BLD: 68.6 % (ref 38–73)
NRBC BLD-RTO: 0 /100 WBC
PLATELET # BLD AUTO: 296 K/UL (ref 150–450)
PMV BLD AUTO: 9.5 FL (ref 9.2–12.9)
POTASSIUM SERPL-SCNC: 4.3 MMOL/L (ref 3.5–5.1)
PROT SERPL-MCNC: 7 G/DL (ref 6–8.4)
RBC # BLD AUTO: 3.82 M/UL (ref 4.6–6.2)
SODIUM SERPL-SCNC: 142 MMOL/L (ref 136–145)
WBC # BLD AUTO: 9.12 K/UL (ref 3.9–12.7)

## 2023-01-18 PROCEDURE — 1125F AMNT PAIN NOTED PAIN PRSNT: CPT | Mod: CPTII,,,

## 2023-01-18 PROCEDURE — 1160F RVW MEDS BY RX/DR IN RCRD: CPT | Mod: CPTII,,,

## 2023-01-18 PROCEDURE — 3288F PR FALLS RISK ASSESSMENT DOCUMENTED: ICD-10-PCS | Mod: CPTII,,,

## 2023-01-18 PROCEDURE — 1159F MED LIST DOCD IN RCRD: CPT | Mod: CPTII,,,

## 2023-01-18 PROCEDURE — 1160F PR REVIEW ALL MEDS BY PRESCRIBER/CLIN PHARMACIST DOCUMENTED: ICD-10-PCS | Mod: CPTII,,,

## 2023-01-18 PROCEDURE — 1101F PT FALLS ASSESS-DOCD LE1/YR: CPT | Mod: CPTII,,,

## 2023-01-18 PROCEDURE — 99214 OFFICE O/P EST MOD 30 MIN: CPT | Mod: ,,,

## 2023-01-18 PROCEDURE — 36415 PR COLLECTION VENOUS BLOOD,VENIPUNCTURE: ICD-10-PCS | Mod: ,,, | Performed by: STUDENT IN AN ORGANIZED HEALTH CARE EDUCATION/TRAINING PROGRAM

## 2023-01-18 PROCEDURE — 1159F PR MEDICATION LIST DOCUMENTED IN MEDICAL RECORD: ICD-10-PCS | Mod: CPTII,,,

## 2023-01-18 PROCEDURE — 99214 PR OFFICE/OUTPT VISIT, EST, LEVL IV, 30-39 MIN: ICD-10-PCS | Mod: ,,,

## 2023-01-18 PROCEDURE — 85025 COMPLETE CBC W/AUTO DIFF WBC: CPT

## 2023-01-18 PROCEDURE — 1125F PR PAIN SEVERITY QUANTIFIED, PAIN PRESENT: ICD-10-PCS | Mod: CPTII,,,

## 2023-01-18 PROCEDURE — 1101F PR PT FALLS ASSESS DOC 0-1 FALLS W/OUT INJ PAST YR: ICD-10-PCS | Mod: CPTII,,,

## 2023-01-18 PROCEDURE — 36415 COLL VENOUS BLD VENIPUNCTURE: CPT | Mod: ,,, | Performed by: STUDENT IN AN ORGANIZED HEALTH CARE EDUCATION/TRAINING PROGRAM

## 2023-01-18 PROCEDURE — 3288F FALL RISK ASSESSMENT DOCD: CPT | Mod: CPTII,,,

## 2023-01-18 PROCEDURE — 80053 COMPREHEN METABOLIC PANEL: CPT

## 2023-01-18 RX ORDER — OXYCODONE AND ACETAMINOPHEN 10; 325 MG/1; MG/1
1 TABLET ORAL EVERY 4 HOURS PRN
COMMUNITY
Start: 2022-12-02

## 2023-01-18 NOTE — PROGRESS NOTES
Subjective:       Patient ID: Robert Nava is a 89 y.o. male.    Chief Complaint: Follow-up and Hypertension (Pt here for 3 month check up HTN CKD/Pt had labs drawn last October)    Patient presents to the clinic for a 3 month follow up.     Patient Active Problem List:     Positive colorectal cancer screening using Cologuard test     Irregular heart rhythm     Acute rhinitis     Prostate cancer     Hyperlipidemia     Hypertension     Bone cancer    Follows with Dr. Rivera for prostate and bone cancer-taking Xtandi.   States he is about to start radiation on his back. States he has back pain with prolonged standing.     He reports a good appetite. Denies falls. States he has been doing well and will soon turn 89.     Hypertension-  BP Readings from Last 3 Encounters:  01/18/23 : 130/80  10/18/22 : 112/62  06/24/21 : 135/75  Taking Norvasc as prescribed.     Hyperlipidemia-  Lab Results       Component                Value               Date                       CHOL                     167                 04/16/2018            Lab Results       Component                Value               Date                       HDL                      111 (H)             04/16/2018            Lab Results       Component                Value               Date                       LDLCALC                  50.0 (L)            04/16/2018            Lab Results       Component                Value               Date                       TRIG                     30                  04/16/2018            Lab Results       Component                Value               Date                       CHOLHDL                  66.5 (H)            04/16/2018            Taking Zocor as prescribed without any reported side effects.     He has no complaints or concerns today. Denies falls. Reports good appetite. States overall he is doing well.     Patient educated on plan of care, verbalized understanding.      Review of Systems    Constitutional:  Negative for activity change, appetite change, chills, diaphoresis and fever.   HENT:  Negative for congestion, ear pain, postnasal drip, sinus pressure, sneezing and sore throat.    Eyes:  Negative for pain, discharge, redness and itching.   Respiratory:  Negative for apnea, cough, chest tightness, shortness of breath and wheezing.    Cardiovascular:  Negative for chest pain and leg swelling.   Gastrointestinal:  Negative for abdominal distention, abdominal pain, constipation, diarrhea, nausea and vomiting.   Genitourinary:  Negative for difficulty urinating, dysuria, flank pain and frequency.   Musculoskeletal:  Positive for arthralgias, back pain and myalgias.   Skin:  Negative for color change, rash and wound.   Neurological:  Negative for dizziness.   All other systems reviewed and are negative.    Patient Active Problem List   Diagnosis    Positive colorectal cancer screening using Cologuard test    Irregular heart rhythm    Acute rhinitis    Prostate cancer    Hyperlipidemia    Hypertension    Bone cancer       Objective:      Physical Exam  Vitals and nursing note reviewed.   Constitutional:       Appearance: Normal appearance. He is not ill-appearing.   HENT:      Head: Normocephalic and atraumatic.      Nose: Nose normal.   Eyes:      General: Lids are normal.   Cardiovascular:      Rate and Rhythm: Normal rate and regular rhythm.      Pulses: Normal pulses.      Heart sounds: Normal heart sounds.   Pulmonary:      Effort: Pulmonary effort is normal. No tachypnea or respiratory distress.      Breath sounds: Normal breath sounds. No wheezing.   Abdominal:      General: Bowel sounds are normal. There is no distension.      Palpations: Abdomen is soft.      Tenderness: There is no abdominal tenderness.   Musculoskeletal:         General: Normal range of motion.      Cervical back: Full passive range of motion without pain and normal range of motion.      Left lower leg: No edema.   Skin:     " General: Skin is warm and dry.   Neurological:      Mental Status: He is alert and oriented to person, place, and time.   Psychiatric:         Mood and Affect: Mood normal.         Behavior: Behavior normal.       Lab Results   Component Value Date    WBC 9.12 01/18/2023    HGB 11.6 (L) 01/18/2023    HCT 35.2 (L) 01/18/2023     01/18/2023    CHOL 167 04/16/2018    TRIG 30 04/16/2018     (H) 04/16/2018    ALT 12 01/18/2023    AST 16 01/18/2023     01/18/2023    K 4.3 01/18/2023     01/18/2023    CREATININE 1.4 01/18/2023    BUN 20 01/18/2023    CO2 25 01/18/2023    TSH 0.879 03/03/2021    HGBA1C 5.3 04/16/2018     The ASCVD Risk score (Lore DK, et al., 2019) failed to calculate for the following reasons:    The 2019 ASCVD risk score is only valid for ages 40 to 79  Visit Vitals  /80 (BP Location: Left arm, Patient Position: Sitting, BP Method: Medium (Manual))   Pulse 91   Temp 98.2 °F (36.8 °C)   Ht 5' 11" (1.803 m)   Wt 88.7 kg (195 lb 8.8 oz)   SpO2 96%   BMI 27.27 kg/m²      Assessment:       1. Primary hypertension    2. Prostate cancer    3. Mixed hyperlipidemia    4. 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 Primary hypertension  -     Comprehensive Metabolic Panel; Future; Expected date: 01/18/2023   - Stable-Continue Norvasc   - Continue current plan of care    2. Prostate cancer  -     CBC auto differential; Future; Expected date: 01/18/2023   - Continue current plan of care   - Follow up with Dr. Rivera    3. Mixed hyperlipidemia   - Stable-Continue Zocor   - Continue current plan of care    4. 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 Rievra         Follow up in about 3 months (around 4/18/2023), or if symptoms worsen or fail to improve.      Future Appointments       Date Provider Specialty Appt Notes    4/18/2023 Renetta Matos NP [facility]  3 month follow up             "

## 2023-01-19 NOTE — PROGRESS NOTES
Please contact the patient and let them know that their results were fine and do not require any change in treatment.  ThanksRenetta

## 2023-04-18 ENCOUNTER — PATIENT OUTREACH (OUTPATIENT)
Dept: ADMINISTRATIVE | Facility: HOSPITAL | Age: 88
End: 2023-04-18
Payer: MEDICARE

## 2023-04-18 ENCOUNTER — OFFICE VISIT (OUTPATIENT)
Dept: FAMILY MEDICINE | Facility: CLINIC | Age: 88
End: 2023-04-18
Payer: MEDICARE

## 2023-04-18 VITALS
BODY MASS INDEX: 27.19 KG/M2 | HEIGHT: 71 IN | TEMPERATURE: 99 F | WEIGHT: 194.25 LBS | SYSTOLIC BLOOD PRESSURE: 134 MMHG | HEART RATE: 76 BPM | DIASTOLIC BLOOD PRESSURE: 62 MMHG | OXYGEN SATURATION: 95 %

## 2023-04-18 DIAGNOSIS — I10 PRIMARY HYPERTENSION: ICD-10-CM

## 2023-04-18 DIAGNOSIS — C61 PROSTATE CANCER: ICD-10-CM

## 2023-04-18 DIAGNOSIS — C40.22 MALIGNANT NEOPLASM OF LONG BONE OF LEFT LOWER EXTREMITY: Primary | ICD-10-CM

## 2023-04-18 DIAGNOSIS — E78.2 MIXED HYPERLIPIDEMIA: ICD-10-CM

## 2023-04-18 PROCEDURE — 1101F PT FALLS ASSESS-DOCD LE1/YR: CPT | Mod: CPTII,,,

## 2023-04-18 PROCEDURE — 3288F FALL RISK ASSESSMENT DOCD: CPT | Mod: CPTII,,,

## 2023-04-18 PROCEDURE — 1160F RVW MEDS BY RX/DR IN RCRD: CPT | Mod: CPTII,,,

## 2023-04-18 PROCEDURE — 1159F PR MEDICATION LIST DOCUMENTED IN MEDICAL RECORD: ICD-10-PCS | Mod: CPTII,,,

## 2023-04-18 PROCEDURE — 1125F PR PAIN SEVERITY QUANTIFIED, PAIN PRESENT: ICD-10-PCS | Mod: CPTII,,,

## 2023-04-18 PROCEDURE — 99214 OFFICE O/P EST MOD 30 MIN: CPT | Mod: ,,,

## 2023-04-18 PROCEDURE — 1101F PR PT FALLS ASSESS DOC 0-1 FALLS W/OUT INJ PAST YR: ICD-10-PCS | Mod: CPTII,,,

## 2023-04-18 PROCEDURE — 3288F PR FALLS RISK ASSESSMENT DOCUMENTED: ICD-10-PCS | Mod: CPTII,,,

## 2023-04-18 PROCEDURE — 99214 PR OFFICE/OUTPT VISIT, EST, LEVL IV, 30-39 MIN: ICD-10-PCS | Mod: ,,,

## 2023-04-18 PROCEDURE — 1160F PR REVIEW ALL MEDS BY PRESCRIBER/CLIN PHARMACIST DOCUMENTED: ICD-10-PCS | Mod: CPTII,,,

## 2023-04-18 PROCEDURE — 1159F MED LIST DOCD IN RCRD: CPT | Mod: CPTII,,,

## 2023-04-18 PROCEDURE — 1125F AMNT PAIN NOTED PAIN PRSNT: CPT | Mod: CPTII,,,

## 2023-04-18 RX ORDER — AMLODIPINE BESYLATE 10 MG/1
10 TABLET ORAL DAILY
Qty: 90 TABLET | Refills: 1 | Status: SHIPPED | OUTPATIENT
Start: 2023-04-18

## 2023-04-18 RX ORDER — GABAPENTIN 300 MG/1
300 CAPSULE ORAL 3 TIMES DAILY
Qty: 270 CAPSULE | Refills: 1 | Status: SHIPPED | OUTPATIENT
Start: 2023-04-18

## 2023-04-18 RX ORDER — MULTIVITAMIN
TABLET ORAL
Qty: 180 TABLET | Refills: 1 | Status: SHIPPED | OUTPATIENT
Start: 2023-04-18

## 2023-04-18 RX ORDER — MULTIVITAMIN
TABLET ORAL
COMMUNITY
Start: 2022-11-04 | End: 2023-04-18 | Stop reason: SDUPTHER

## 2023-04-18 RX ORDER — TAMSULOSIN HYDROCHLORIDE 0.4 MG/1
0.4 CAPSULE ORAL NIGHTLY
Qty: 90 CAPSULE | Refills: 1 | Status: SHIPPED | OUTPATIENT
Start: 2023-04-18

## 2023-04-18 RX ORDER — SIMVASTATIN 40 MG/1
40 TABLET, FILM COATED ORAL NIGHTLY
Qty: 90 TABLET | Refills: 1 | Status: SHIPPED | OUTPATIENT
Start: 2023-04-18

## 2023-04-18 NOTE — PATIENT INSTRUCTIONS

## 2023-04-18 NOTE — PROGRESS NOTES
Subjective:       Patient ID: Robert Nava is a 89 y.o. male.    Chief Complaint: Follow-up (3 months), Hypertension (Taking his medication as directed w/o any problems or concerns ), and Hyperlipidemia (Taking his medication as directed w/o any problems or concerns )    Patient presents to the clinic for a 3 month follow up.     Patient Active Problem List:     Positive colorectal cancer screening using Cologuard test     Irregular heart rhythm     Acute rhinitis     Prostate cancer     Hyperlipidemia     Hypertension     Bone cancer    Follows with:  Dr. Hyatt- Oncology    His Urologist retired and he needs a new referral.     Has no new complaints or concerns today.     Patient educated on plan of care, verbalized understanding.       Review of Systems   Constitutional:  Negative for activity change, appetite change, chills, diaphoresis and fever.   HENT:  Negative for congestion, ear pain, postnasal drip, sinus pressure, sneezing and sore throat.    Eyes:  Negative for pain, discharge, redness and itching.   Respiratory:  Negative for apnea, cough, chest tightness, shortness of breath and wheezing.    Cardiovascular:  Negative for chest pain and leg swelling.   Gastrointestinal:  Negative for abdominal distention, abdominal pain, constipation, diarrhea, nausea and vomiting.   Genitourinary:  Negative for difficulty urinating, dysuria, flank pain and frequency.   Musculoskeletal:  Positive for arthralgias and myalgias.   Skin:  Negative for color change, rash and wound.   Neurological:  Negative for dizziness.   All other systems reviewed and are negative.    Patient Active Problem List   Diagnosis    Positive colorectal cancer screening using Cologuard test    Irregular heart rhythm    Acute rhinitis    Prostate cancer    Hyperlipidemia    Hypertension    Bone cancer       Objective:      Physical Exam  Vitals and nursing note reviewed.   Constitutional:       Appearance: Normal appearance. He is not  "ill-appearing.   HENT:      Head: Normocephalic and atraumatic.      Nose: Nose normal.   Eyes:      General: Lids are normal.   Cardiovascular:      Rate and Rhythm: Normal rate and regular rhythm.      Pulses: Normal pulses.      Heart sounds: Normal heart sounds.   Pulmonary:      Effort: Pulmonary effort is normal. No tachypnea or respiratory distress.      Breath sounds: Normal breath sounds. No wheezing.   Abdominal:      General: Bowel sounds are normal. There is no distension.      Palpations: Abdomen is soft.      Tenderness: There is no abdominal tenderness.   Musculoskeletal:         General: Normal range of motion.      Cervical back: Full passive range of motion without pain and normal range of motion.      Left lower leg: No edema.   Skin:     General: Skin is warm and dry.   Neurological:      Mental Status: He is alert and oriented to person, place, and time.   Psychiatric:         Mood and Affect: Mood normal.         Behavior: Behavior normal.       Lab Results   Component Value Date    WBC 9.12 01/18/2023    HGB 11.6 (L) 01/18/2023    HCT 35.2 (L) 01/18/2023     01/18/2023    CHOL 167 04/16/2018    TRIG 30 04/16/2018     (H) 04/16/2018    ALT 12 01/18/2023    AST 16 01/18/2023     01/18/2023    K 4.3 01/18/2023     01/18/2023    CREATININE 1.4 01/18/2023    BUN 20 01/18/2023    CO2 25 01/18/2023    TSH 0.879 03/03/2021    HGBA1C 5.3 04/16/2018     The ASCVD Risk score (Lore DK, et al., 2019) failed to calculate for the following reasons:    The 2019 ASCVD risk score is only valid for ages 40 to 79  Visit Vitals  /62 (BP Location: Left arm, Patient Position: Sitting, BP Method: Medium (Manual))   Pulse 76   Temp 98.5 °F (36.9 °C) (Temporal)   Ht 5' 11" (1.803 m)   Wt 88.1 kg (194 lb 4.3 oz)   SpO2 95%   BMI 27.10 kg/m²      Assessment:       1. 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 Prostate cancer    3. Mixed hyperlipidemia    4. Primary " hypertension        Plan:       1. 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 Hyatt    2. Prostate cancer  -     tamsulosin (FLOMAX) 0.4 mg Cap; Take 1 capsule (0.4 mg total) by mouth every evening.  Dispense: 90 capsule; Refill: 1  -     Ambulatory referral/consult to Urology; Future; Expected date: 04/25/2023    3. Mixed hyperlipidemia  -     simvastatin (ZOCOR) 40 MG tablet; Take 1 tablet (40 mg total) by mouth every evening.  Dispense: 90 tablet; Refill: 1   - Stable-Continue Zocor   - Low fat, low cholesterol diet   - Continue current plan of care    4. Primary hypertension  -     amLODIPine (NORVASC) 10 MG tablet; Take 1 tablet (10 mg total) by mouth once daily.  Dispense: 90 tablet; Refill: 1   - Stable-Continue Norcvasc   - Continue current plan of care    Other orders  -     gabapentin (NEURONTIN) 300 MG capsule; Take 1 capsule (300 mg total) by mouth 3 (three) times daily.  Dispense: 270 capsule; Refill: 1       Follow up in about 3 months (around 7/18/2023), or if symptoms worsen or fail to improve.      Future Appointments       Date Provider Specialty Appt Notes    4/18/2023 Renetta Matos, STEVE [facility]  3 month follow up

## 2023-04-25 ENCOUNTER — OFFICE VISIT (OUTPATIENT)
Dept: UROLOGY | Facility: CLINIC | Age: 88
End: 2023-04-25
Payer: MEDICARE

## 2023-04-25 VITALS
DIASTOLIC BLOOD PRESSURE: 75 MMHG | HEIGHT: 71 IN | WEIGHT: 190.94 LBS | SYSTOLIC BLOOD PRESSURE: 153 MMHG | BODY MASS INDEX: 26.73 KG/M2 | HEART RATE: 91 BPM

## 2023-04-25 DIAGNOSIS — R35.0 URINARY FREQUENCY: ICD-10-CM

## 2023-04-25 DIAGNOSIS — C61 PROSTATE CANCER: ICD-10-CM

## 2023-04-25 DIAGNOSIS — N39.0 URINARY TRACT INFECTION WITHOUT HEMATURIA, SITE UNSPECIFIED: Primary | ICD-10-CM

## 2023-04-25 LAB
BILIRUBIN, UA POC OHS: NEGATIVE
BLOOD, UA POC OHS: NEGATIVE
CLARITY, UA POC OHS: ABNORMAL
COLOR, UA POC OHS: YELLOW
GLUCOSE, UA POC OHS: NEGATIVE
KETONES, UA POC OHS: ABNORMAL
LEUKOCYTES, UA POC OHS: ABNORMAL
NITRITE, UA POC OHS: POSITIVE
PH, UA POC OHS: 6
POC RESIDUAL URINE VOLUME: 11 ML (ref 0–100)
PROTEIN, UA POC OHS: 100
SPECIFIC GRAVITY, UA POC OHS: 1.02
UROBILINOGEN, UA POC OHS: 0.2

## 2023-04-25 PROCEDURE — 81003 URINALYSIS AUTO W/O SCOPE: CPT | Mod: QW,S$GLB,, | Performed by: NURSE PRACTITIONER

## 2023-04-25 PROCEDURE — 87086 URINE CULTURE/COLONY COUNT: CPT | Performed by: NURSE PRACTITIONER

## 2023-04-25 PROCEDURE — 1160F PR REVIEW ALL MEDS BY PRESCRIBER/CLIN PHARMACIST DOCUMENTED: ICD-10-PCS | Mod: CPTII,S$GLB,, | Performed by: NURSE PRACTITIONER

## 2023-04-25 PROCEDURE — 3288F FALL RISK ASSESSMENT DOCD: CPT | Mod: CPTII,S$GLB,, | Performed by: NURSE PRACTITIONER

## 2023-04-25 PROCEDURE — 1101F PR PT FALLS ASSESS DOC 0-1 FALLS W/OUT INJ PAST YR: ICD-10-PCS | Mod: CPTII,S$GLB,, | Performed by: NURSE PRACTITIONER

## 2023-04-25 PROCEDURE — 1101F PT FALLS ASSESS-DOCD LE1/YR: CPT | Mod: CPTII,S$GLB,, | Performed by: NURSE PRACTITIONER

## 2023-04-25 PROCEDURE — 87077 CULTURE AEROBIC IDENTIFY: CPT | Performed by: NURSE PRACTITIONER

## 2023-04-25 PROCEDURE — 99204 PR OFFICE/OUTPT VISIT, NEW, LEVL IV, 45-59 MIN: ICD-10-PCS | Mod: S$GLB,,, | Performed by: NURSE PRACTITIONER

## 2023-04-25 PROCEDURE — 1159F PR MEDICATION LIST DOCUMENTED IN MEDICAL RECORD: ICD-10-PCS | Mod: CPTII,S$GLB,, | Performed by: NURSE PRACTITIONER

## 2023-04-25 PROCEDURE — 87186 SC STD MICRODIL/AGAR DIL: CPT | Performed by: NURSE PRACTITIONER

## 2023-04-25 PROCEDURE — 81003 POCT URINALYSIS(INSTRUMENT): ICD-10-PCS | Mod: QW,S$GLB,, | Performed by: NURSE PRACTITIONER

## 2023-04-25 PROCEDURE — 99204 OFFICE O/P NEW MOD 45 MIN: CPT | Mod: S$GLB,,, | Performed by: NURSE PRACTITIONER

## 2023-04-25 PROCEDURE — 99999 PR PBB SHADOW E&M-EST. PATIENT-LVL III: ICD-10-PCS | Mod: PBBFAC,,, | Performed by: NURSE PRACTITIONER

## 2023-04-25 PROCEDURE — 99999 PR PBB SHADOW E&M-EST. PATIENT-LVL III: CPT | Mod: PBBFAC,,, | Performed by: NURSE PRACTITIONER

## 2023-04-25 PROCEDURE — 51798 POCT BLADDER SCAN: ICD-10-PCS | Mod: S$GLB,,, | Performed by: NURSE PRACTITIONER

## 2023-04-25 PROCEDURE — 3288F PR FALLS RISK ASSESSMENT DOCUMENTED: ICD-10-PCS | Mod: CPTII,S$GLB,, | Performed by: NURSE PRACTITIONER

## 2023-04-25 PROCEDURE — 1159F MED LIST DOCD IN RCRD: CPT | Mod: CPTII,S$GLB,, | Performed by: NURSE PRACTITIONER

## 2023-04-25 PROCEDURE — 51798 US URINE CAPACITY MEASURE: CPT | Mod: S$GLB,,, | Performed by: NURSE PRACTITIONER

## 2023-04-25 PROCEDURE — 87088 URINE BACTERIA CULTURE: CPT | Performed by: NURSE PRACTITIONER

## 2023-04-25 PROCEDURE — 1160F RVW MEDS BY RX/DR IN RCRD: CPT | Mod: CPTII,S$GLB,, | Performed by: NURSE PRACTITIONER

## 2023-04-25 RX ORDER — CIPROFLOXACIN 250 MG/1
250 TABLET, FILM COATED ORAL 2 TIMES DAILY
Qty: 20 TABLET | Refills: 0 | Status: SHIPPED | OUTPATIENT
Start: 2023-04-25 | End: 2023-05-05

## 2023-04-25 NOTE — PROGRESS NOTES
"[facility] Note  Staff: JACIEL Ortiz    PCP: STEVE Matos    Chief Complaint: Establish with new Urology office-Hx of Prostate CA with bone metastases, LUTS.    Subjective:        HPI: Robert Nava is a 89 y.o. male NEW PT presents today for the following, family with pt during visit today:  "His Urologist retired and requested to establish with new MD at this time.   Last OV with Dr. Jonh Rivera ([place], [place]) was on 10/25/2022 with the following history:"     HX per Care Everywhere with [facility].  Androgen deprivation therapy (Long term (current) use of other agents affecting estrogen receptors and estrogen levels, Z79.818) .   Cancer of prostate (Malignant neoplasm of prostate, C61) .   Elevated PSA measurement (Elevated prostate specific antigen [PSA], R97.20)     Pt is established with Dr. Brigitte Hyatt with [facility].  1. 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   89-year-old male with a longstanding history of prostate cancer.  He was diagnosed in 2010.  He never received definitive treatment, but received androgen deprivation.  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  His PSA has risen on Lupron and Xtandi.  He is now being switched to Xgeva.  He does complain of left hip pain, which has been worsening over the last couple of months.  This is consistent with his bone scan and CT findings.  I recommended palliative radiation treatment to the left acetabulum.  I had a detailed discussion with the patient regarding the recommendation for treatment, the treatment course, treatment delivery, and the anticipated acute and long-term side effects.  He is in agreement.  We will bring him back for a treatment planning scan and we will try to get his treatment initiated as soon as possible.    Male urinary stress incontinence (Stress incontinence (female) (male), N39.3) .   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   He has otherwise tolerated the Lupron well with minimal fatigue and hot flashes.  We also reviewed staging studies in detail without any evidence of gross metastatic disease.  We will continue with ADT and xtandi and he has proven to have castrate resistant disease with a rise in PSA on ADT.     LUTS:     Regarding voiding symptoms, he has appropriately modified fluids since our last visit to no avail.  He has tried flomax 0.4mg i po qd to no avail. He had been ordered Myrbetriq but was unable to afford the prescription.  Pt in the past also tried Gemtesa without success.        TODAY:  UA in office visit showed ABNORMAL findings.  PVR by bladder scan: 11 mL  Pt currently denies gross hematuria, but +Dysuria at this time.  Current  meds include:  Flomax 0.4 mg daily.     REVIEW OF SYSTEMS:  A comprehensive 10 system review was performed and is negative except as noted above in HPI    PMHx:  Past Medical History:   Diagnosis Date    Bone cancer     left hip    BPH (benign prostatic hyperplasia)     Cancer     Hyperlipidemia     Hypertension     Prostate cancer      PSHx:  Past Surgical History:   Procedure Laterality Date    COLONOSCOPY      aprox 2008    COLONOSCOPY N/A 7/27/2020    Procedure: COLONOSCOPY;  Surgeon: Jersey Adam MD;  Location: [facility];  Service: General;  Laterality: N/A;    TONSILLECTOMY       Allergies:  Patient has no known allergies.    Medications: reviewed   Objective:     Vitals:    04/25/23 0920   BP: (!) 153/75   Pulse: 91     General:WDWN in NAD  Eyes: PERRLA, normal conjunctiva  Respiratory: no increased work on breathing, clear to auscultation  Cardiovascular: regular rate and rhythm. No obvious extremity edema.  GI: palpation of masses. No tenderness. No hepatosplenomegaly to palpation.  Musculoskeletal: normal range of motion of bilateral upper extremities. Normal muscle strength and tone.  Skin: no obvious rashes or lesions. No tightening of skin  noted.  Neurologic: CN grossly normal. Normal sensation.   Psychiatric: awake, alert and oriented x 3. Mood and affect normal. Cooperative.      Assessment:       1. Urinary tract infection without hematuria, site unspecified    2. Prostate cancer    3. Urinary frequency          Plan:   Prostate CA; UTI:    Continue to F/UP with his Oncologist for monitoring of CA at this time.  Continue Flomax 0.4 mg daily.  We will send off urine for culture to rule out UTI at this time, in meantime will prescribe Cipro 250 mg BID.    Discussed conservative measures to control urgency and frequency including avoiding bladder irritants, timed voiding, not postponing voiding, and bowel regimen (as distended bowel has extrinsic compressive effect on bladder. Discussed bladder irritants include coffe (even decaf), tea, alcohol, soda, spicy foods, acidic juices (orange, tomato), vinegar, and artificial sweeteners.     RTC in 3 months for f/up.    [facility]: Active    Rowena Rowan, FNP-C

## 2023-04-27 LAB — BACTERIA UR CULT: ABNORMAL

## 2023-07-02 ENCOUNTER — HOSPITAL ENCOUNTER (EMERGENCY)
Facility: HOSPITAL | Age: 88
Discharge: HOME | End: 2023-07-03
Attending: EMERGENCY MEDICINE
Payer: MEDICARE

## 2023-07-02 DIAGNOSIS — K59.00 CONSTIPATION, UNSPECIFIED CONSTIPATION TYPE: Primary | ICD-10-CM

## 2023-07-02 PROCEDURE — 99284 EMERGENCY DEPT VISIT MOD MDM: CPT

## 2023-07-03 VITALS
WEIGHT: 194 LBS | SYSTOLIC BLOOD PRESSURE: 128 MMHG | RESPIRATION RATE: 18 BRPM | HEART RATE: 109 BPM | OXYGEN SATURATION: 96 % | HEIGHT: 71 IN | TEMPERATURE: 98 F | DIASTOLIC BLOOD PRESSURE: 71 MMHG | BODY MASS INDEX: 27.16 KG/M2

## 2023-07-03 LAB
ALBUMIN SERPL BCP-MCNC: 3.8 G/DL (ref 3.5–5.2)
ALP SERPL-CCNC: 36 U/L (ref 55–135)
ALT SERPL W/O P-5'-P-CCNC: 13 U/L (ref 10–44)
ANION GAP SERPL CALC-SCNC: 11 MMOL/L (ref 8–16)
AST SERPL-CCNC: 13 U/L (ref 10–40)
BASOPHILS # BLD AUTO: 0.03 K/UL (ref 0–0.2)
BASOPHILS NFR BLD: 0.3 % (ref 0–1.9)
BILIRUB SERPL-MCNC: 0.6 MG/DL (ref 0.1–1)
BUN SERPL-MCNC: 20 MG/DL (ref 8–23)
CALCIUM SERPL-MCNC: 8.8 MG/DL (ref 8.7–10.5)
CHLORIDE SERPL-SCNC: 107 MMOL/L (ref 95–110)
CO2 SERPL-SCNC: 21 MMOL/L (ref 23–29)
CREAT SERPL-MCNC: 1.3 MG/DL (ref 0.5–1.4)
DIFFERENTIAL METHOD: ABNORMAL
EOSINOPHIL # BLD AUTO: 0 K/UL (ref 0–0.5)
EOSINOPHIL NFR BLD: 0.1 % (ref 0–8)
ERYTHROCYTE [DISTWIDTH] IN BLOOD BY AUTOMATED COUNT: 14.6 % (ref 11.5–14.5)
EST. GFR  (NO RACE VARIABLE): 52 ML/MIN/1.73 M^2
GLUCOSE SERPL-MCNC: 110 MG/DL (ref 70–110)
HCT VFR BLD AUTO: 33.4 % (ref 40–54)
HGB BLD-MCNC: 11.1 G/DL (ref 14–18)
IMM GRANULOCYTES # BLD AUTO: 0.08 K/UL (ref 0–0.04)
IMM GRANULOCYTES NFR BLD AUTO: 0.9 % (ref 0–0.5)
LIPASE SERPL-CCNC: 41 U/L (ref 4–60)
LYMPHOCYTES # BLD AUTO: 0.6 K/UL (ref 1–4.8)
LYMPHOCYTES NFR BLD: 7.1 % (ref 18–48)
MCH RBC QN AUTO: 29.4 PG (ref 27–31)
MCHC RBC AUTO-ENTMCNC: 33.2 G/DL (ref 32–36)
MCV RBC AUTO: 89 FL (ref 82–98)
MONOCYTES # BLD AUTO: 0.4 K/UL (ref 0.3–1)
MONOCYTES NFR BLD: 4.4 % (ref 4–15)
NEUTROPHILS # BLD AUTO: 7.5 K/UL (ref 1.8–7.7)
NEUTROPHILS NFR BLD: 87.2 % (ref 38–73)
NRBC BLD-RTO: 0 /100 WBC
PLATELET # BLD AUTO: 184 K/UL (ref 150–450)
PMV BLD AUTO: 9.1 FL (ref 9.2–12.9)
POTASSIUM SERPL-SCNC: 3.9 MMOL/L (ref 3.5–5.1)
PROT SERPL-MCNC: 6.3 G/DL (ref 6–8.4)
RBC # BLD AUTO: 3.77 M/UL (ref 4.6–6.2)
SODIUM SERPL-SCNC: 139 MMOL/L (ref 136–145)
WBC # BLD AUTO: 8.63 K/UL (ref 3.9–12.7)

## 2023-07-03 PROCEDURE — 36415 COLL VENOUS BLD VENIPUNCTURE: CPT | Performed by: EMERGENCY MEDICINE

## 2023-07-03 PROCEDURE — 80053 COMPREHEN METABOLIC PANEL: CPT | Performed by: EMERGENCY MEDICINE

## 2023-07-03 PROCEDURE — 25000003 PHARM REV CODE 250: Performed by: EMERGENCY MEDICINE

## 2023-07-03 PROCEDURE — 83690 ASSAY OF LIPASE: CPT | Performed by: EMERGENCY MEDICINE

## 2023-07-03 PROCEDURE — 85025 COMPLETE CBC W/AUTO DIFF WBC: CPT | Performed by: EMERGENCY MEDICINE

## 2023-07-03 RX ORDER — BISACODYL 10 MG
10 SUPPOSITORY, RECTAL RECTAL
Status: COMPLETED | OUTPATIENT
Start: 2023-07-03 | End: 2023-07-03

## 2023-07-03 RX ORDER — LACTULOSE 10 G/15ML
30 SOLUTION ORAL
Status: COMPLETED | OUTPATIENT
Start: 2023-07-03 | End: 2023-07-03

## 2023-07-03 RX ORDER — BISACODYL 10 MG
10 SUPPOSITORY, RECTAL RECTAL DAILY PRN
Qty: 15 SUPPOSITORY | Refills: 0 | Status: SHIPPED | OUTPATIENT
Start: 2023-07-03 | End: 2023-07-16 | Stop reason: SDUPTHER

## 2023-07-03 RX ORDER — POLYETHYLENE GLYCOL 3350 17 G/17G
17 POWDER, FOR SOLUTION ORAL DAILY
Qty: 10 EACH | Refills: 0 | Status: SHIPPED | OUTPATIENT
Start: 2023-07-03 | End: 2023-07-16 | Stop reason: SDUPTHER

## 2023-07-03 RX ADMIN — Medication 1 ENEMA: at 02:07

## 2023-07-03 RX ADMIN — BISACODYL 10 MG: 10 SUPPOSITORY RECTAL at 01:07

## 2023-07-03 RX ADMIN — LACTULOSE 30 G: 10 SOLUTION ORAL at 02:07

## 2023-07-03 NOTE — ED PROVIDER NOTES
Encounter Date: 2023       History     Chief Complaint   Patient presents with    Abdominal Pain     Feels bloated     Patient is a 89-year-old male with a past medical history hypertension hyperlipidemia prostate cancer BPH presents emergency room constipation.  He states he feels bloated.  He has not had a bowel movement in 2-3 days.  He has no vomiting fevers dysuria urinary hesitancy.  He takes for oxycodone 10 mg a day as well as gabapentin.  He denies any current abdominal pain.  He does not take any stool softeners.  He has no vomiting.    Review of patient's allergies indicates:  No Known Allergies  Past Medical History:   Diagnosis Date    Bone cancer     left hip    BPH (benign prostatic hyperplasia)     Cancer     Hyperlipidemia     Hypertension     Prostate cancer      Past Surgical History:   Procedure Laterality Date    COLONOSCOPY      aprox     COLONOSCOPY N/A 2020    Procedure: COLONOSCOPY;  Surgeon: Jersey Adam MD;  Location: [facility] [place];  Service: General;  Laterality: N/A;    TONSILLECTOMY       Family History   Problem Relation Age of Onset    Cancer Brother     Heart disease Daughter      Social History     Tobacco Use    Smoking status: Former     Types: Cigarettes     Quit date: 1972     Years since quittin.5    Smokeless tobacco: Current     Types: Chew   Substance Use Topics    Alcohol use: Yes     Alcohol/week: 1.0 standard drink     Types: 1 Shots of liquor per week     Comment: daily    Drug use: Never     Review of Systems   Constitutional:  Negative for fever.   HENT:  Negative for ear pain, rhinorrhea and sore throat.    Eyes:  Negative for pain and visual disturbance.   Respiratory:  Negative for cough and shortness of breath.    Cardiovascular:  Negative for chest pain.   Gastrointestinal:  Positive for constipation. Negative for abdominal pain, diarrhea, nausea and vomiting.   Genitourinary:  Negative for difficulty urinating.   Musculoskeletal:  Negative  for arthralgias.   Skin:  Negative for rash.   Neurological:  Negative for weakness, numbness and headaches.   All other systems reviewed and are negative.    Physical Exam     Initial Vitals [07/02/23 2346]   BP Pulse Resp Temp SpO2   126/78 100 20 98 °F (36.7 °C) 99 %      MAP       --         Physical Exam    Nursing note and vitals reviewed.  Constitutional: He appears well-developed.   HENT:   Head: Normocephalic and atraumatic.   Mouth/Throat: Oropharynx is clear and moist.   Eyes: EOM are normal. Pupils are equal, round, and reactive to light.   Neck: Neck supple.   Cardiovascular:  Normal rate, regular rhythm, normal heart sounds and intact distal pulses.           Pulmonary/Chest: Breath sounds normal. He has no wheezes. He has no rhonchi. He has no rales.   Abdominal: Abdomen is soft. Bowel sounds are normal. He exhibits distension. There is no abdominal tenderness. There is no rebound and no guarding.   Musculoskeletal:         General: Normal range of motion.      Cervical back: Neck supple.     Neurological: He is alert and oriented to person, place, and time. He has normal strength. No sensory deficit.   Skin: Skin is warm and dry.   Psychiatric: He has a normal mood and affect.       ED Course   Procedures  Labs Reviewed   CBC W/ AUTO DIFFERENTIAL - Abnormal; Notable for the following components:       Result Value    RBC 3.77 (*)     Hemoglobin 11.1 (*)     Hematocrit 33.4 (*)     RDW 14.6 (*)     MPV 9.1 (*)     Immature Granulocytes 0.9 (*)     Immature Grans (Abs) 0.08 (*)     Lymph # 0.6 (*)     Gran % 87.2 (*)     Lymph % 7.1 (*)     All other components within normal limits   COMPREHENSIVE METABOLIC PANEL - Abnormal; Notable for the following components:    CO2 21 (*)     Alkaline Phosphatase 36 (*)     eGFR 52 (*)     All other components within normal limits   LIPASE          Imaging Results              CT Abdomen Pelvis  Without Contrast (In process)                      Medications    bisacodyL suppository 10 mg (10 mg Rectal Given 7/3/23 0103)   lactulose 20 gram/30 mL solution Soln 30 g (30 g Oral Given 7/3/23 0224)     Medical Decision Making:   CT scan is being read as colitis but he has no fever significant abdominal tenderness leukocytosis.  He takes large doses of opiates daily and does not take any stool softeners.  I will try a course of lactulose and Dulcolax suppository.  I believe he is constipated needs to take MiraLax daily with Dulcolax suppositories and enemas.  I believe he is stable for discharge at this time.  I do not think he has ischemic colitis diverticulitis for infectious process at this time.  I believe he is stable for discharge.  Other findings on CT are chronic.                        Clinical Impression:   Final diagnoses:  [K59.00] Constipation, unspecified constipation type (Primary)        ED Disposition Condition    Discharge Stable          ED Prescriptions       Medication Sig Dispense Start Date End Date Auth. Provider    polyethylene glycol (GLYCOLAX) 17 gram PwPk Take 17 g by mouth once daily. 10 each 7/3/2023 8/2/2023 Juanpablo Romero MD    bisacodyL (DULCOLAX) 10 mg Supp Place 1 suppository (10 mg total) rectally daily as needed. 15 suppository 7/3/2023 -- Juanpablo Romero MD          Follow-up Information       Follow up With Specialties Details Why Contact Info    Alejandrina Yadav MD Family Medicine Schedule an appointment as soon as possible for a visit   [address] [facility] [address]  [phone]               Juanpablo Romero MD  07/03/23 0220

## 2023-07-16 ENCOUNTER — HOSPITAL ENCOUNTER (EMERGENCY)
Facility: HOSPITAL | Age: 88
Discharge: HOME | End: 2023-07-16
Payer: MEDICARE

## 2023-07-16 VITALS
HEIGHT: 71 IN | BODY MASS INDEX: 25.62 KG/M2 | OXYGEN SATURATION: 98 % | DIASTOLIC BLOOD PRESSURE: 81 MMHG | WEIGHT: 183 LBS | SYSTOLIC BLOOD PRESSURE: 131 MMHG | RESPIRATION RATE: 18 BRPM | TEMPERATURE: 98 F | HEART RATE: 77 BPM

## 2023-07-16 DIAGNOSIS — K59.03 DRUG-INDUCED CONSTIPATION: Primary | ICD-10-CM

## 2023-07-16 PROCEDURE — 25000003 PHARM REV CODE 250: Performed by: EMERGENCY MEDICINE

## 2023-07-16 PROCEDURE — 99284 EMERGENCY DEPT VISIT MOD MDM: CPT

## 2023-07-16 RX ORDER — BISACODYL 10 MG
10 SUPPOSITORY, RECTAL RECTAL DAILY PRN
Qty: 15 SUPPOSITORY | Refills: 0 | Status: SHIPPED | OUTPATIENT
Start: 2023-07-16

## 2023-07-16 RX ORDER — POLYETHYLENE GLYCOL 3350 17 G/17G
17 POWDER, FOR SOLUTION ORAL DAILY
Qty: 10 EACH | Refills: 0 | Status: SHIPPED | OUTPATIENT
Start: 2023-07-16 | End: 2023-08-15

## 2023-07-16 RX ORDER — LACTULOSE 10 G/15ML
10 SOLUTION ORAL
Status: COMPLETED | OUTPATIENT
Start: 2023-07-16 | End: 2023-07-16

## 2023-07-16 RX ADMIN — LACTULOSE 10 G: 20 SOLUTION ORAL at 08:07

## 2023-07-16 NOTE — ED PROVIDER NOTES
Encounter Date: 2023       History     Chief Complaint   Patient presents with    Constipation     BM 2 days ago. Feels constipated.      89-year-old male presents with constipation for 2 days denies abdominal pain nausea vomiting or changes in urinary habits.  States he is not been taking the bowel regimen that he was previously prescribed.  He is on opioid pain medication daily for chronic pain denies trauma.  Denies fevers.  Patient is still passing gas      Review of patient's allergies indicates:  No Known Allergies  Past Medical History:   Diagnosis Date    Bone cancer     left hip    BPH (benign prostatic hyperplasia)     Cancer     Hyperlipidemia     Hypertension     Prostate cancer      Past Surgical History:   Procedure Laterality Date    COLONOSCOPY      aprox     COLONOSCOPY N/A 2020    Procedure: COLONOSCOPY;  Surgeon: Jersey Adam MD;  Location: [facility] [place];  Service: General;  Laterality: N/A;    TONSILLECTOMY       Family History   Problem Relation Age of Onset    Cancer Brother     Heart disease Daughter      Social History     Tobacco Use    Smoking status: Former     Types: Cigarettes     Quit date: 1972     Years since quittin.5    Smokeless tobacco: Current     Types: Chew   Substance Use Topics    Alcohol use: Yes     Alcohol/week: 1.0 standard drink     Types: 1 Shots of liquor per week     Comment: daily    Drug use: Never     Review of Systems   Constitutional:  Negative for fever.   HENT:  Negative for sore throat.    Respiratory:  Negative for shortness of breath.    Cardiovascular:  Negative for chest pain.   Gastrointestinal:  Positive for constipation. Negative for abdominal pain and nausea.   Genitourinary:  Negative for dysuria.   Musculoskeletal:  Negative for back pain.   Skin:  Negative for rash.   Neurological:  Negative for weakness.   Hematological:  Does not bruise/bleed easily.   All other systems reviewed and are negative.    Physical Exam      Initial Vitals [07/16/23 0802]   BP Pulse Resp Temp SpO2   131/81 77 18 98 °F (36.7 °C) 98 %      MAP       --         Physical Exam    Nursing note and vitals reviewed.  Constitutional: He appears well-developed and well-nourished.   HENT:   Head: Normocephalic and atraumatic.   Eyes: EOM are normal. Pupils are equal, round, and reactive to light.   Neck: Neck supple.   Normal range of motion.  Cardiovascular:  Normal rate, regular rhythm and normal heart sounds.           Pulmonary/Chest: Breath sounds normal.   Abdominal: Abdomen is soft. Bowel sounds are normal.   Musculoskeletal:         General: Normal range of motion.      Cervical back: Normal range of motion and neck supple.     Neurological: He is alert and oriented to person, place, and time. GCS score is 15. GCS eye subscore is 4. GCS verbal subscore is 5. GCS motor subscore is 6.   Skin: Skin is warm and dry.   Psychiatric: He has a normal mood and affect. His behavior is normal. Judgment and thought content normal.       ED Course   Procedures  Labs Reviewed - No data to display       Imaging Results    None          Medications   lactulose 20 gram/30 mL solution Soln 10 g (10 g Oral Given 7/16/23 0815)     Medical Decision Making:   Initial Assessment:   89-year-old presents with constipation for 2 days, reassuring physical exam.  Not on bowel regimen like he supposed to be.  We will give him a dose of lactulose here with plan for reinforcement of bowel regimen instructions and discharge  Differential Diagnosis:   Constipation, bowel obstruction, colitis  ED Management:  Patient in no acute distress laughing and joking with staff and myself.  Abdomen is soft and nontender.  He has positive bowel sounds he is passing gas.  Patient is not taking the bowel regimen he is supposed to.  Was given a dose of lactulose here in the emergency department and discharged home with renewal of bowel regimen prescriptions that were already in his chart.                         Clinical Impression:   Final diagnoses:  [K59.03] Drug-induced constipation (Primary)        ED Disposition Condition    Discharge Stable          ED Prescriptions       Medication Sig Dispense Start Date End Date Auth. Provider    bisacodyL (DULCOLAX) 10 mg Supp Place 1 suppository (10 mg total) rectally daily as needed (for constipation). 15 suppository 7/16/2023 -- Huong Rose MD    polyethylene glycol (GLYCOLAX) 17 gram PwPk Take 17 g by mouth once daily. 10 each 7/16/2023 8/15/2023 Huong Rose MD          Follow-up Information       Follow up With Specialties Details Why Contact Info    Alejandrina Yadav MD Family Medicine  For  re-check [address] [facility] [place] [address] [phone]               Huong Rose MD  07/16/23 0341

## 2023-07-19 ENCOUNTER — TELEPHONE (OUTPATIENT)
Dept: FAMILY MEDICINE | Facility: CLINIC | Age: 88
End: 2023-07-19
Payer: MEDICARE
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
## 2023-08-05 ENCOUNTER — HOSPITAL ENCOUNTER (EMERGENCY)
Facility: HOSPITAL | Age: 88
Discharge: HOME | End: 2023-08-05
Attending: EMERGENCY MEDICINE
Payer: MEDICARE

## 2023-08-05 VITALS
SYSTOLIC BLOOD PRESSURE: 117 MMHG | RESPIRATION RATE: 17 BRPM | BODY MASS INDEX: 25.62 KG/M2 | HEIGHT: 71 IN | TEMPERATURE: 100 F | OXYGEN SATURATION: 97 % | DIASTOLIC BLOOD PRESSURE: 72 MMHG | HEART RATE: 72 BPM | WEIGHT: 183 LBS

## 2023-08-05 DIAGNOSIS — K57.92 DIVERTICULITIS: Primary | ICD-10-CM

## 2023-08-05 LAB
ALBUMIN SERPL BCP-MCNC: 3.1 G/DL (ref 3.5–5.2)
ALP SERPL-CCNC: 44 U/L (ref 55–135)
ALT SERPL W/O P-5'-P-CCNC: 11 U/L (ref 10–44)
ANION GAP SERPL CALC-SCNC: 10 MMOL/L (ref 8–16)
AST SERPL-CCNC: 11 U/L (ref 10–40)
BACTERIA #/AREA URNS HPF: NORMAL /HPF
BASOPHILS # BLD AUTO: 0.03 K/UL (ref 0–0.2)
BASOPHILS NFR BLD: 0.3 % (ref 0–1.9)
BILIRUB SERPL-MCNC: 0.5 MG/DL (ref 0.1–1)
BILIRUB UR QL STRIP: NEGATIVE
BUN SERPL-MCNC: 16 MG/DL (ref 8–23)
CALCIUM SERPL-MCNC: 9.4 MG/DL (ref 8.7–10.5)
CHLORIDE SERPL-SCNC: 106 MMOL/L (ref 95–110)
CLARITY UR: CLEAR
CO2 SERPL-SCNC: 20 MMOL/L (ref 23–29)
COLOR UR: YELLOW
CREAT SERPL-MCNC: 1.1 MG/DL (ref 0.5–1.4)
DIFFERENTIAL METHOD: ABNORMAL
EOSINOPHIL # BLD AUTO: 0 K/UL (ref 0–0.5)
EOSINOPHIL NFR BLD: 0.1 % (ref 0–8)
ERYTHROCYTE [DISTWIDTH] IN BLOOD BY AUTOMATED COUNT: 15 % (ref 11.5–14.5)
EST. GFR  (NO RACE VARIABLE): >60 ML/MIN/1.73 M^2
GLUCOSE SERPL-MCNC: 115 MG/DL (ref 70–110)
GLUCOSE UR QL STRIP: NEGATIVE
HCT VFR BLD AUTO: 30.4 % (ref 40–54)
HCV AB SERPL QL IA: NORMAL
HGB BLD-MCNC: 10.2 G/DL (ref 14–18)
HGB UR QL STRIP: NEGATIVE
HIV 1+2 AB+HIV1 P24 AG SERPL QL IA: NORMAL
HYALINE CASTS #/AREA URNS LPF: 1 /LPF
IMM GRANULOCYTES # BLD AUTO: 0.15 K/UL (ref 0–0.04)
IMM GRANULOCYTES NFR BLD AUTO: 1.5 % (ref 0–0.5)
KETONES UR QL STRIP: NEGATIVE
LACTATE SERPL-SCNC: 1 MMOL/L (ref 0.5–2.2)
LEUKOCYTE ESTERASE UR QL STRIP: NEGATIVE
LIPASE SERPL-CCNC: 66 U/L (ref 4–60)
LYMPHOCYTES # BLD AUTO: 0.7 K/UL (ref 1–4.8)
LYMPHOCYTES NFR BLD: 6.5 % (ref 18–48)
MCH RBC QN AUTO: 28.6 PG (ref 27–31)
MCHC RBC AUTO-ENTMCNC: 33.6 G/DL (ref 32–36)
MCV RBC AUTO: 85 FL (ref 82–98)
MICROSCOPIC COMMENT: NORMAL
MONOCYTES # BLD AUTO: 0.7 K/UL (ref 0.3–1)
MONOCYTES NFR BLD: 6.9 % (ref 4–15)
NEUTROPHILS # BLD AUTO: 8.6 K/UL (ref 1.8–7.7)
NEUTROPHILS NFR BLD: 84.7 % (ref 38–73)
NITRITE UR QL STRIP: NEGATIVE
NRBC BLD-RTO: 0 /100 WBC
PH UR STRIP: 7 [PH] (ref 5–8)
PLATELET # BLD AUTO: 257 K/UL (ref 150–450)
PMV BLD AUTO: 8.8 FL (ref 9.2–12.9)
POTASSIUM SERPL-SCNC: 4.8 MMOL/L (ref 3.5–5.1)
PROT SERPL-MCNC: 6.6 G/DL (ref 6–8.4)
PROT UR QL STRIP: ABNORMAL
RBC # BLD AUTO: 3.57 M/UL (ref 4.6–6.2)
RBC #/AREA URNS HPF: 2 /HPF (ref 0–4)
SODIUM SERPL-SCNC: 136 MMOL/L (ref 136–145)
SP GR UR STRIP: 1.02 (ref 1–1.03)
URN SPEC COLLECT METH UR: ABNORMAL
UROBILINOGEN UR STRIP-ACNC: NEGATIVE EU/DL
WBC # BLD AUTO: 10.14 K/UL (ref 3.9–12.7)
WBC #/AREA URNS HPF: 2 /HPF (ref 0–5)

## 2023-08-05 PROCEDURE — 74177 CT ABD & PELVIS W/CONTRAST: CPT | Mod: 26,,, | Performed by: RADIOLOGY

## 2023-08-05 PROCEDURE — 63600175 PHARM REV CODE 636 W HCPCS: Performed by: NURSE PRACTITIONER

## 2023-08-05 PROCEDURE — 96361 HYDRATE IV INFUSION ADD-ON: CPT

## 2023-08-05 PROCEDURE — 25500020 PHARM REV CODE 255: Performed by: EMERGENCY MEDICINE

## 2023-08-05 PROCEDURE — 80053 COMPREHEN METABOLIC PANEL: CPT | Performed by: NURSE PRACTITIONER

## 2023-08-05 PROCEDURE — 25000003 PHARM REV CODE 250: Performed by: NURSE PRACTITIONER

## 2023-08-05 PROCEDURE — 81000 URINALYSIS NONAUTO W/SCOPE: CPT | Performed by: NURSE PRACTITIONER

## 2023-08-05 PROCEDURE — 74177 CT ABD & PELVIS W/CONTRAST: CPT | Mod: TC

## 2023-08-05 PROCEDURE — 86803 HEPATITIS C AB TEST: CPT | Performed by: EMERGENCY MEDICINE

## 2023-08-05 PROCEDURE — 74177 CT ABDOMEN PELVIS WITH CONTRAST: ICD-10-PCS | Mod: 26,,, | Performed by: RADIOLOGY

## 2023-08-05 PROCEDURE — 96374 THER/PROPH/DIAG INJ IV PUSH: CPT

## 2023-08-05 PROCEDURE — 83605 ASSAY OF LACTIC ACID: CPT | Performed by: NURSE PRACTITIONER

## 2023-08-05 PROCEDURE — 99285 EMERGENCY DEPT VISIT HI MDM: CPT | Mod: 25

## 2023-08-05 PROCEDURE — 87389 HIV-1 AG W/HIV-1&-2 AB AG IA: CPT | Performed by: EMERGENCY MEDICINE

## 2023-08-05 PROCEDURE — 96375 TX/PRO/DX INJ NEW DRUG ADDON: CPT

## 2023-08-05 PROCEDURE — 85025 COMPLETE CBC W/AUTO DIFF WBC: CPT | Performed by: NURSE PRACTITIONER

## 2023-08-05 PROCEDURE — 83690 ASSAY OF LIPASE: CPT | Performed by: NURSE PRACTITIONER

## 2023-08-05 RX ORDER — MORPHINE SULFATE 2 MG/ML
2 INJECTION, SOLUTION INTRAMUSCULAR; INTRAVENOUS
Status: COMPLETED | OUTPATIENT
Start: 2023-08-05 | End: 2023-08-05

## 2023-08-05 RX ORDER — AMOXICILLIN AND CLAVULANATE POTASSIUM 875; 125 MG/1; MG/1
1 TABLET, FILM COATED ORAL 2 TIMES DAILY
Qty: 20 TABLET | Refills: 0 | Status: ON HOLD | OUTPATIENT
Start: 2023-08-05 | End: 2023-08-24 | Stop reason: HOSPADM

## 2023-08-05 RX ORDER — AMOXICILLIN AND CLAVULANATE POTASSIUM 875; 125 MG/1; MG/1
1 TABLET, FILM COATED ORAL
Status: COMPLETED | OUTPATIENT
Start: 2023-08-05 | End: 2023-08-05

## 2023-08-05 RX ORDER — PREDNISONE 5 MG/1
5 TABLET ORAL 2 TIMES DAILY
COMMUNITY
Start: 2023-08-04

## 2023-08-05 RX ORDER — ONDANSETRON 2 MG/ML
4 INJECTION INTRAMUSCULAR; INTRAVENOUS
Status: COMPLETED | OUTPATIENT
Start: 2023-08-05 | End: 2023-08-05

## 2023-08-05 RX ORDER — DOCUSATE SODIUM 100 MG/1
100 CAPSULE, LIQUID FILLED ORAL 2 TIMES DAILY
Qty: 60 CAPSULE | Refills: 0 | Status: SHIPPED | OUTPATIENT
Start: 2023-08-05

## 2023-08-05 RX ADMIN — SODIUM CHLORIDE 500 ML: 9 INJECTION, SOLUTION INTRAVENOUS at 01:08

## 2023-08-05 RX ADMIN — ONDANSETRON HYDROCHLORIDE 4 MG: 2 SOLUTION INTRAMUSCULAR; INTRAVENOUS at 01:08

## 2023-08-05 RX ADMIN — IOHEXOL 75 ML: 350 INJECTION, SOLUTION INTRAVENOUS at 02:08

## 2023-08-05 RX ADMIN — MORPHINE SULFATE 2 MG: 2 INJECTION, SOLUTION INTRAMUSCULAR; INTRAVENOUS at 01:08

## 2023-08-05 RX ADMIN — AMOXICILLIN AND CLAVULANATE POTASSIUM 1 TABLET: 875; 125 TABLET, FILM COATED ORAL at 05:08

## 2023-08-05 NOTE — ED PROVIDER NOTES
Encounter Date: 2023       History     Chief Complaint   Patient presents with    Abdominal Pain     LLQ abdominal pain x 3 days.  Last BM this am and was normal.     POV to ED with family.  Patient complains of left lower quadrant pain for 3 days.  Nausea.  Decreased appetite.  Denies past history of similar pain.  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  Denies fever, denies vomiting, or constipation.  No other complaints. States that he has an appointment next week for further assessment of metastatic disease    The history is provided by the patient and a relative.     Review of patient's allergies indicates:  No Known Allergies  Past Medical History:   Diagnosis Date    Bone cancer     left hip    BPH (benign prostatic hyperplasia)     Cancer     Hyperlipidemia     Hypertension     Prostate cancer      Past Surgical History:   Procedure Laterality Date    COLONOSCOPY      aprox     COLONOSCOPY N/A 2020    Procedure: COLONOSCOPY;  Surgeon: Jersey Adam MD;  Location: [facility] [place] [place];  Service: General;  Laterality: N/A;    TONSILLECTOMY       Family History   Problem Relation Age of Onset    Cancer Brother     Heart disease Daughter      Social History     Tobacco Use    Smoking status: Former     Current packs/day: 0.00     Types: Cigarettes     Quit date: 1972     Years since quittin.6    Smokeless tobacco: Current     Types: Chew   Substance Use Topics    Alcohol use: Yes     Alcohol/week: 1.0 standard drink of alcohol     Types: 1 Shots of liquor per week     Comment: daily    Drug use: Never     Review of Systems   Constitutional:  Positive for appetite change. Negative for chills and fever.   HENT:  Negative for ear pain and sore throat.    Respiratory:  Negative for cough and shortness of breath.    Cardiovascular:  Negative for chest pain.   Gastrointestinal:  Positive for abdominal pain and nausea. Negative for constipation, diarrhea  and vomiting.   Genitourinary:  Negative for dysuria.   Musculoskeletal:  Negative for back pain.        Hip pain, recently diagnosis with metastasis left hip cancer   All other systems reviewed and are negative.      Physical Exam     Initial Vitals   BP Pulse Resp Temp SpO2   08/05/23 1305 08/05/23 1302 08/05/23 1302 08/05/23 1302 08/05/23 1302   111/65 100 20 99.5 °F (37.5 °C) 97 %      MAP       --                Physical Exam    Nursing note and vitals reviewed.  Constitutional: He appears well-developed and well-nourished. No distress.   Comfortable, talkative, converses without difficulty   HENT:   Head: Normocephalic.   Mouth/Throat: Oropharynx is clear and moist.   Eyes: Pupils are equal, round, and reactive to light.   Neck:   Normal range of motion.  Cardiovascular:  Normal rate and regular rhythm.           Pulmonary/Chest: Breath sounds normal. No respiratory distress.   Abdominal: Abdomen is soft. Bowel sounds are normal.   Moderate tenderness left lower quadrant   Musculoskeletal:         General: Normal range of motion.      Cervical back: Normal range of motion.      Comments: Ambulation with use of cane, present at arrival     Neurological: He is alert and oriented to person, place, and time. GCS score is 15. GCS eye subscore is 4. GCS verbal subscore is 5. GCS motor subscore is 6.   Skin: Skin is warm and dry. Capillary refill takes 2 to 3 seconds.   Psychiatric: He has a normal mood and affect. Thought content normal.         ED Course   Procedures  Labs Reviewed   CBC W/ AUTO DIFFERENTIAL - Abnormal; Notable for the following components:       Result Value    RBC 3.57 (*)     Hemoglobin 10.2 (*)     Hematocrit 30.4 (*)     RDW 15.0 (*)     MPV 8.8 (*)     Immature Granulocytes 1.5 (*)     Gran # (ANC) 8.6 (*)     Immature Grans (Abs) 0.15 (*)     Lymph # 0.7 (*)     Gran % 84.7 (*)     Lymph % 6.5 (*)     All other components within normal limits   COMPREHENSIVE METABOLIC PANEL - Abnormal;  Notable for the following components:    CO2 20 (*)     Glucose 115 (*)     Albumin 3.1 (*)     Alkaline Phosphatase 44 (*)     All other components within normal limits   LIPASE - Abnormal; Notable for the following components:    Lipase 66 (*)     All other components within normal limits   URINALYSIS, REFLEX TO URINE CULTURE - Abnormal; Notable for the following components:    Protein, UA 1+ (*)     All other components within normal limits    Narrative:     Preferred Collection Type->Urine, Clean Catch  Specimen Source->Urine   LACTIC ACID, PLASMA   URINALYSIS MICROSCOPIC    Narrative:     Preferred Collection Type->Urine, Clean Catch  Specimen Source->Urine   HIV 1 / 2 ANTIBODY   HEPATITIS C ANTIBODY          Imaging Results              CT Abdomen Pelvis With Contrast (Final result)  Result time 08/05/23 15:30:45      Final result by Neftaly Rodriguez MD (08/05/23 15:30:45)                   Impression:      1. Findings suggestive of mild enteritis versus diverticulitis changes without complication involving the distal descending colon/sigmoid colon.  No abscess/fluid collection or free air.  No bowel obstruction.  2. Redemonstrated hypodense liver lesions which are nonspecific however potential metastatic disease remains in the differential with clinical correlation needed.  3. Prominent bilateral renal cysts and atrophic kidneys.  4. Stable sclerotic lesions involving the pelvis.  5. Additional stable/incidental findings as above.      Electronically signed by: Neftaly Rodriguez  Date:    08/05/2023  Time:    15:30               Narrative:    EXAMINATION:  CT ABDOMEN PELVIS WITH CONTRAST    CLINICAL HISTORY:  Abdominal abscess/infection suspected;    TECHNIQUE:  Low dose axial images, sagittal and coronal reformations were obtained from the lung bases to the pubic symphysis following the IV administration of 75 mL of Omnipaque 350 contrast.    COMPARISON:  CT 07/03/2023.    FINDINGS:  Lower thorax: Small pericardial  effusion redemonstrated.  Mild bibasilar atelectasis.  No focal consolidation or pleural effusion.    Liver: The liver is normal in size.  Redemonstrated multifocal liver hypodensities which are described as new on prior CT 07/03/2023 and not substantially changed with the largest seen within the right hepatic lobe measuring 2.8 cm.  Metastatic disease is in the differential.  Main portal vein appears patent.    Gallbladder: No calcified gallstones. No pericholecystic inflammatory change.    Bile Ducts: No evidence of intra or extra hepatic biliary ductal dilation.    Pancreas: No discrete mass or peripancreatic fat stranding.    Stomach: No significant CT abnormality.    Spleen: Normal in size and attenuation.  No focal lesion.    Adrenals: Adrenal glands appear within normal limits.    Kidneys: Kidneys are atrophic again demonstrating numerous hypodensities consistent with simple cysts.  No enhancing mass identified.  No nephrolithiasis or hydronephrosis.  The ureters are within normal limits.  Scratch    Bladder: No abnormal bladder wall thickening.    Reproductive: Dystrophic calcifications within the prostate.  Assessment of the prostate is limited by CT technique.  Mild prostatomegaly.    Bowel/Mesentery: Distal colonic diverticula with mild pericolonic fat stranding of the distal descending colon/sigmoid colon within the left lower quadrant suggestive of potential mild enteritis or diverticulitis changes.  No abscess or focal fluid collection.  No ascites or free air.  No evidence of bowel obstruction.  Appendix is unremarkable.    Lymph nodes: No pathologically enlarged lymph nodes.    Vascular: Mild ectasia of the infrarenal abdominal aorta.  Prominent atherosclerotic changes.  Similar mild ectasia of the bilateral common iliac arteries.    Abdominal Wall/Soft Tissues: No significant abnormalities.    Bones: No acute bony process.  Small sclerotic lesions redemonstrated within the right sacral al a and left  ischium similar to the prior exam.                                    X-Rays:   Independently Interpreted Readings:   Other Readings:  EXAMINATION:  CT ABDOMEN PELVIS WITH CONTRAST     CLINICAL HISTORY:  Abdominal abscess/infection suspected;     TECHNIQUE:  Low dose axial images, sagittal and coronal reformations were obtained from the lung bases to the pubic symphysis following the IV administration of 75 mL of Omnipaque 350 contrast.     COMPARISON:  CT 07/03/2023.     FINDINGS:  Lower thorax: Small pericardial effusion redemonstrated.  Mild bibasilar atelectasis.  No focal consolidation or pleural effusion.     Liver: The liver is normal in size.  Redemonstrated multifocal liver hypodensities which are described as new on prior CT 07/03/2023 and not substantially changed with the largest seen within the right hepatic lobe measuring 2.8 cm.  Metastatic disease is in the differential.  Main portal vein appears patent.     Gallbladder: No calcified gallstones. No pericholecystic inflammatory change.     Bile Ducts: No evidence of intra or extra hepatic biliary ductal dilation.     Pancreas: No discrete mass or peripancreatic fat stranding.     Stomach: No significant CT abnormality.     Spleen: Normal in size and attenuation.  No focal lesion.    Adrenals: Adrenal glands appear within normal limits.     Kidneys: Kidneys are atrophic again demonstrating numerous hypodensities consistent with simple cysts.  No enhancing mass identified.  No nephrolithiasis or hydronephrosis.  The ureters are within normal limits.  Scratch     Bladder: No abnormal bladder wall thickening.     Reproductive: Dystrophic calcifications within the prostate.  Assessment of the prostate is limited by CT technique.  Mild prostatomegaly.     Bowel/Mesentery: Distal colonic diverticula with mild pericolonic fat stranding of the distal descending colon/sigmoid colon within the left lower quadrant suggestive of potential mild enteritis or  diverticulitis changes.  No abscess or focal fluid collection.  No ascites or free air.  No evidence of bowel obstruction.  Appendix is unremarkable.     Lymph nodes: No pathologically enlarged lymph nodes.     Vascular: Mild ectasia of the infrarenal abdominal aorta.  Prominent atherosclerotic changes.  Similar mild ectasia of the bilateral common iliac arteries.     Abdominal Wall/Soft Tissues: No significant abnormalities.     Bones: No acute bony process.  Small sclerotic lesions redemonstrated within the right sacral al a and left ischium similar to the prior exam.     Impression:     1. Findings suggestive of mild enteritis versus diverticulitis changes without complication involving the distal descending colon/sigmoid colon.  No abscess/fluid collection or free air.  No bowel obstruction.  2. Redemonstrated hypodense liver lesions which are nonspecific however potential metastatic disease remains in the differential with clinical correlation needed.  3. Prominent bilateral renal cysts and atrophic kidneys.  4. Stable sclerotic lesions involving the pelvis.  5. Additional stable/incidental findings as above.        Medications   sodium chloride 0.9% bolus 500 mL 500 mL (0 mLs Intravenous Stopped 8/5/23 1457)   morphine injection 2 mg (2 mg Intravenous Given 8/5/23 1357)   ondansetron injection 4 mg (4 mg Intravenous Given 8/5/23 1358)   iohexoL (OMNIPAQUE 350) injection 100 mL (75 mLs Intravenous Given 8/5/23 1452)   amoxicillin-clavulanate 875-125mg per tablet 1 tablet (1 tablet Oral Given 8/5/23 1706)     Medical Decision Making:   Initial Assessment:   Presents for evaluation of abdominal pain.  Lab order, CT ordered.  Disposition pending  Differential Diagnosis:   Duration, volume depletion, abnormal electrolytes, anemia, UTI, pancreatitis, diverticulitis, diverticulosis, pancreatitis, bowel obstruction, mass  ED Management:  CT indicating enteritis versus diverticulitis. Augmentin for home use.  First  dose in the ED. Patient is to follow-up with his clinic as planned next week, to return as needed. Patient and family agree with plan of care  Other:   I discussed test(s) with the performing physician.       <> Summary of the Findings: Dr Dneson             ED Course as of 08/05/23 1802   Sat Aug 05, 2023   1456 Awaiting labs and CT results [CP]   1516 Awaiting lab and CT results [CP]   1545 Comprehensive Metabolic Panel (CMP)   Final result 08/05 1352    CO2 20  Glucose 115  Albumin 3.1  Alkaline Phosphatase 44     Lipase                 Final result 08/05 1352    Lipase 66    Complete Blood Count (CBC)                 Final result 08/05 1352    RBC 3.57  Hemoglobin 10.2  Hematocrit 30.4  RDW 15.0  MPV 8.8  Immature Granulocytes 1.5  Gran # (ANC) 8.6  Immature Grans (Abs) 0.15  Lymph # 0.7  Gran % 84.7       [CP]      ED Course User Index  [CP] Shelia Medley NP                   Clinical Impression:   Final diagnoses:  [K57.92] Diverticulitis (Primary)       ED Disposition Condition    Discharge Stable          ED Prescriptions       Medication Sig Dispense Start Date End Date Auth. Provider    amoxicillin-clavulanate 875-125mg (AUGMENTIN) 875-125 mg per tablet Take 1 tablet by mouth 2 (two) times daily. 20 tablet 8/5/2023 -- Shelia Medley NP    docusate sodium (COLACE) 100 MG capsule Take 1 capsule (100 mg total) by mouth 2 (two) times daily. 60 capsule 8/5/2023 -- Shelia Medley NP          Follow-up Information       Follow up With Specialties Details Why Contact Info    Alejandrina Yadav MD Family Medicine Call in 3 days for office recheck [address] [facility] [place] [address] [phone]               Shelia Medley NP  08/05/23 1345       Shelia Medley NP  08/05/23 1545       Shelia Medley NP  08/05/23 1802

## 2023-08-23 ENCOUNTER — HOSPITAL ENCOUNTER (OUTPATIENT)
Facility: HOSPITAL | Age: 88
Discharge: HOME | End: 2023-08-24
Attending: STUDENT IN AN ORGANIZED HEALTH CARE EDUCATION/TRAINING PROGRAM | Admitting: INTERNAL MEDICINE
Payer: MEDICARE
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
## 2023-08-23 DIAGNOSIS — R07.9 CHEST PAIN: ICD-10-CM

## 2023-08-23 DIAGNOSIS — M25.519 SHOULDER PAIN, ACUTE: ICD-10-CM

## 2023-08-23 DIAGNOSIS — R05.9 COUGH: ICD-10-CM

## 2023-08-23 DIAGNOSIS — I63.9 ISCHEMIC STROKE: ICD-10-CM

## 2023-08-23 DIAGNOSIS — R53.1 ACUTE RIGHT-SIDED WEAKNESS: ICD-10-CM

## 2023-08-23 PROBLEM — M25.511 ACUTE PAIN OF RIGHT SHOULDER DUE TO TRAUMA: Status: ACTIVE | Noted: 2023-08-23

## 2023-08-23 PROBLEM — C79.51 PROSTATE CANCER METASTATIC TO BONE: Status: ACTIVE | Noted: 2023-08-23

## 2023-08-23 PROBLEM — N40.0 BPH WITHOUT OBSTRUCTION/LOWER URINARY TRACT SYMPTOMS: Status: ACTIVE | Noted: 2023-08-23

## 2023-08-23 PROBLEM — C61 PROSTATE CANCER METASTATIC TO BONE: Status: ACTIVE | Noted: 2023-08-23

## 2023-08-23 PROBLEM — G89.11 ACUTE PAIN OF RIGHT SHOULDER DUE TO TRAUMA: Status: ACTIVE | Noted: 2023-08-23

## 2023-08-23 PROBLEM — F10.929 ACUTE ALCOHOLIC INTOXICATION WITH COMPLICATION: Status: ACTIVE | Noted: 2023-08-23

## 2023-08-23 PROBLEM — R55 SYNCOPE AND COLLAPSE: Status: ACTIVE | Noted: 2023-08-23

## 2023-08-23 PROBLEM — S09.90XA MILD CLOSED HEAD INJURY: Status: ACTIVE | Noted: 2023-08-23

## 2023-08-23 PROBLEM — R29.898 RIGHT ARM WEAKNESS: Status: ACTIVE | Noted: 2023-08-23

## 2023-08-23 PROBLEM — D64.9 NORMOCYTIC ANEMIA: Status: ACTIVE | Noted: 2023-08-23

## 2023-08-23 LAB
ALBUMIN SERPL BCP-MCNC: 3.1 G/DL (ref 3.5–5.2)
ALP SERPL-CCNC: 39 U/L (ref 55–135)
ALT SERPL W/O P-5'-P-CCNC: 14 U/L (ref 10–44)
AMMONIA PLAS-SCNC: 21 UMOL/L (ref 10–50)
AMPHET+METHAMPHET UR QL: NEGATIVE
ANION GAP SERPL CALC-SCNC: 9 MMOL/L (ref 8–16)
APAP SERPL-MCNC: <3 UG/ML (ref 10–20)
APTT PPP: 26.1 SEC (ref 21–32)
AST SERPL-CCNC: 11 U/L (ref 10–40)
BARBITURATES UR QL SCN>200 NG/ML: NEGATIVE
BASOPHILS # BLD AUTO: 0.01 K/UL (ref 0–0.2)
BASOPHILS NFR BLD: 0.2 % (ref 0–1.9)
BENZODIAZ UR QL SCN>200 NG/ML: NEGATIVE
BILIRUB SERPL-MCNC: 0.3 MG/DL (ref 0.1–1)
BILIRUB UR QL STRIP: NEGATIVE
BUN SERPL-MCNC: 13 MG/DL (ref 8–23)
BZE UR QL SCN: NEGATIVE
CALCIUM SERPL-MCNC: 8.1 MG/DL (ref 8.7–10.5)
CANNABINOIDS UR QL SCN: NEGATIVE
CHLORIDE SERPL-SCNC: 114 MMOL/L (ref 95–110)
CK SERPL-CCNC: 16 U/L (ref 20–200)
CLARITY UR: CLEAR
CO2 SERPL-SCNC: 19 MMOL/L (ref 23–29)
COLOR UR: YELLOW
CREAT SERPL-MCNC: 1.1 MG/DL (ref 0.5–1.4)
CREAT UR-MCNC: 53.4 MG/DL (ref 23–375)
DIFFERENTIAL METHOD: ABNORMAL
EOSINOPHIL # BLD AUTO: 0 K/UL (ref 0–0.5)
EOSINOPHIL NFR BLD: 0.2 % (ref 0–8)
ERYTHROCYTE [DISTWIDTH] IN BLOOD BY AUTOMATED COUNT: 16.5 % (ref 11.5–14.5)
EST. GFR  (NO RACE VARIABLE): >60 ML/MIN/1.73 M^2
ETHANOL SERPL-MCNC: 180 MG/DL (ref 0–10)
GLUCOSE SERPL-MCNC: 90 MG/DL (ref 70–110)
GLUCOSE UR QL STRIP: NEGATIVE
HCT VFR BLD AUTO: 25.7 % (ref 40–54)
HGB BLD-MCNC: 8.5 G/DL (ref 14–18)
HGB UR QL STRIP: NEGATIVE
IMM GRANULOCYTES # BLD AUTO: 0.13 K/UL (ref 0–0.04)
IMM GRANULOCYTES NFR BLD AUTO: 2.3 % (ref 0–0.5)
IRON SERPL-MCNC: 60 UG/DL (ref 45–160)
KETONES UR QL STRIP: NEGATIVE
LEUKOCYTE ESTERASE UR QL STRIP: NEGATIVE
LIPASE SERPL-CCNC: 37 U/L (ref 4–60)
LYMPHOCYTES # BLD AUTO: 0.9 K/UL (ref 1–4.8)
LYMPHOCYTES NFR BLD: 15.8 % (ref 18–48)
MAGNESIUM SERPL-MCNC: 2.2 MG/DL (ref 1.6–2.6)
MCH RBC QN AUTO: 29.5 PG (ref 27–31)
MCHC RBC AUTO-ENTMCNC: 33.1 G/DL (ref 32–36)
MCV RBC AUTO: 89 FL (ref 82–98)
METHADONE UR QL SCN>300 NG/ML: NEGATIVE
MONOCYTES # BLD AUTO: 0.5 K/UL (ref 0.3–1)
MONOCYTES NFR BLD: 9 % (ref 4–15)
NEUTROPHILS # BLD AUTO: 4 K/UL (ref 1.8–7.7)
NEUTROPHILS NFR BLD: 72.5 % (ref 38–73)
NITRITE UR QL STRIP: NEGATIVE
NRBC BLD-RTO: 0 /100 WBC
OPIATES UR QL SCN: NEGATIVE
PCP UR QL SCN>25 NG/ML: NEGATIVE
PH UR STRIP: 6 [PH] (ref 5–8)
PLATELET # BLD AUTO: 177 K/UL (ref 150–450)
PMV BLD AUTO: 8.4 FL (ref 9.2–12.9)
POCT GLUCOSE: 104 MG/DL (ref 70–110)
POTASSIUM SERPL-SCNC: 3.7 MMOL/L (ref 3.5–5.1)
PROT SERPL-MCNC: 5.5 G/DL (ref 6–8.4)
PROT UR QL STRIP: NEGATIVE
RBC # BLD AUTO: 2.88 M/UL (ref 4.6–6.2)
SATURATED IRON: 20 % (ref 20–50)
SODIUM SERPL-SCNC: 142 MMOL/L (ref 136–145)
SP GR UR STRIP: <=1.005 (ref 1–1.03)
TOTAL IRON BINDING CAPACITY: 295 UG/DL (ref 250–450)
TOXICOLOGY INFORMATION: NORMAL
TRANSFERRIN SERPL-MCNC: 199 MG/DL (ref 200–375)
TROPONIN I SERPL DL<=0.01 NG/ML-MCNC: <0.006 NG/ML (ref 0–0.03)
TSH SERPL DL<=0.005 MIU/L-ACNC: 0.87 UIU/ML (ref 0.4–4)
URN SPEC COLLECT METH UR: NORMAL
UROBILINOGEN UR STRIP-ACNC: NEGATIVE EU/DL
WBC # BLD AUTO: 5.57 K/UL (ref 3.9–12.7)

## 2023-08-23 PROCEDURE — 85730 THROMBOPLASTIN TIME PARTIAL: CPT | Performed by: STUDENT IN AN ORGANIZED HEALTH CARE EDUCATION/TRAINING PROGRAM

## 2023-08-23 PROCEDURE — 63600175 PHARM REV CODE 636 W HCPCS: Performed by: EMERGENCY MEDICINE

## 2023-08-23 PROCEDURE — 73030 X-RAY EXAM OF SHOULDER: CPT | Mod: 26,RT,, | Performed by: RADIOLOGY

## 2023-08-23 PROCEDURE — 93005 ELECTROCARDIOGRAM TRACING: CPT

## 2023-08-23 PROCEDURE — G0425 PR INPT TELEHEALTH CONSULT 30M: ICD-10-PCS | Mod: 95,,, | Performed by: PSYCHIATRY & NEUROLOGY

## 2023-08-23 PROCEDURE — 71045 XR CHEST AP PORTABLE: ICD-10-PCS | Mod: 26,,, | Performed by: RADIOLOGY

## 2023-08-23 PROCEDURE — 82746 ASSAY OF FOLIC ACID SERUM: CPT | Performed by: INTERNAL MEDICINE

## 2023-08-23 PROCEDURE — 83735 ASSAY OF MAGNESIUM: CPT | Performed by: STUDENT IN AN ORGANIZED HEALTH CARE EDUCATION/TRAINING PROGRAM

## 2023-08-23 PROCEDURE — 72125 CT NECK SPINE W/O DYE: CPT | Mod: TC

## 2023-08-23 PROCEDURE — 25000003 PHARM REV CODE 250: Performed by: INTERNAL MEDICINE

## 2023-08-23 PROCEDURE — 70496 CTA HEAD AND NECK (XPD): ICD-10-PCS | Mod: 26,,, | Performed by: RADIOLOGY

## 2023-08-23 PROCEDURE — 25000003 PHARM REV CODE 250: Performed by: EMERGENCY MEDICINE

## 2023-08-23 PROCEDURE — 82728 ASSAY OF FERRITIN: CPT | Performed by: INTERNAL MEDICINE

## 2023-08-23 PROCEDURE — 70496 CT ANGIOGRAPHY HEAD: CPT | Mod: TC

## 2023-08-23 PROCEDURE — 70496 CT ANGIOGRAPHY HEAD: CPT | Mod: 26,,, | Performed by: RADIOLOGY

## 2023-08-23 PROCEDURE — 84484 ASSAY OF TROPONIN QUANT: CPT | Performed by: STUDENT IN AN ORGANIZED HEALTH CARE EDUCATION/TRAINING PROGRAM

## 2023-08-23 PROCEDURE — 82077 ASSAY SPEC XCP UR&BREATH IA: CPT | Performed by: STUDENT IN AN ORGANIZED HEALTH CARE EDUCATION/TRAINING PROGRAM

## 2023-08-23 PROCEDURE — 70450 CT HEAD WITHOUT CONTRAST: ICD-10-PCS | Mod: 26,,, | Performed by: RADIOLOGY

## 2023-08-23 PROCEDURE — G0378 HOSPITAL OBSERVATION PER HR: HCPCS

## 2023-08-23 PROCEDURE — 96374 THER/PROPH/DIAG INJ IV PUSH: CPT

## 2023-08-23 PROCEDURE — 82962 GLUCOSE BLOOD TEST: CPT

## 2023-08-23 PROCEDURE — 70498 CT ANGIOGRAPHY NECK: CPT | Mod: 26,,, | Performed by: RADIOLOGY

## 2023-08-23 PROCEDURE — 80053 COMPREHEN METABOLIC PANEL: CPT | Performed by: STUDENT IN AN ORGANIZED HEALTH CARE EDUCATION/TRAINING PROGRAM

## 2023-08-23 PROCEDURE — 80143 DRUG ASSAY ACETAMINOPHEN: CPT | Performed by: STUDENT IN AN ORGANIZED HEALTH CARE EDUCATION/TRAINING PROGRAM

## 2023-08-23 PROCEDURE — G0425 INPT/ED TELECONSULT30: HCPCS | Mod: 95,,, | Performed by: PSYCHIATRY & NEUROLOGY

## 2023-08-23 PROCEDURE — 82550 ASSAY OF CK (CPK): CPT | Performed by: STUDENT IN AN ORGANIZED HEALTH CARE EDUCATION/TRAINING PROGRAM

## 2023-08-23 PROCEDURE — 70450 CT HEAD/BRAIN W/O DYE: CPT | Mod: TC

## 2023-08-23 PROCEDURE — 71045 X-RAY EXAM CHEST 1 VIEW: CPT | Mod: TC

## 2023-08-23 PROCEDURE — 93010 ELECTROCARDIOGRAM REPORT: CPT | Mod: ,,, | Performed by: INTERNAL MEDICINE

## 2023-08-23 PROCEDURE — 73030 XR SHOULDER TRAUMA 3 VIEW RIGHT: ICD-10-PCS | Mod: 26,RT,, | Performed by: RADIOLOGY

## 2023-08-23 PROCEDURE — 72125 CT NECK SPINE W/O DYE: CPT | Mod: 26,,, | Performed by: RADIOLOGY

## 2023-08-23 PROCEDURE — 71045 X-RAY EXAM CHEST 1 VIEW: CPT | Mod: 26,,, | Performed by: RADIOLOGY

## 2023-08-23 PROCEDURE — 70450 CT HEAD/BRAIN W/O DYE: CPT | Mod: 26,,, | Performed by: RADIOLOGY

## 2023-08-23 PROCEDURE — 70498 CTA HEAD AND NECK (XPD): ICD-10-PCS | Mod: 26,,, | Performed by: RADIOLOGY

## 2023-08-23 PROCEDURE — 99223 PR INITIAL HOSPITAL CARE,LEVL III: ICD-10-PCS | Mod: AI,,, | Performed by: INTERNAL MEDICINE

## 2023-08-23 PROCEDURE — 84443 ASSAY THYROID STIM HORMONE: CPT | Performed by: INTERNAL MEDICINE

## 2023-08-23 PROCEDURE — 72125 CT CERVICAL SPINE WITHOUT CONTRAST: ICD-10-PCS | Mod: 26,,, | Performed by: RADIOLOGY

## 2023-08-23 PROCEDURE — 85025 COMPLETE CBC W/AUTO DIFF WBC: CPT | Performed by: STUDENT IN AN ORGANIZED HEALTH CARE EDUCATION/TRAINING PROGRAM

## 2023-08-23 PROCEDURE — 82140 ASSAY OF AMMONIA: CPT | Performed by: INTERNAL MEDICINE

## 2023-08-23 PROCEDURE — 99223 1ST HOSP IP/OBS HIGH 75: CPT | Mod: AI,,, | Performed by: INTERNAL MEDICINE

## 2023-08-23 PROCEDURE — 82607 VITAMIN B-12: CPT | Performed by: INTERNAL MEDICINE

## 2023-08-23 PROCEDURE — 86592 SYPHILIS TEST NON-TREP QUAL: CPT | Performed by: INTERNAL MEDICINE

## 2023-08-23 PROCEDURE — 83690 ASSAY OF LIPASE: CPT | Performed by: STUDENT IN AN ORGANIZED HEALTH CARE EDUCATION/TRAINING PROGRAM

## 2023-08-23 PROCEDURE — 93010 EKG 12-LEAD: ICD-10-PCS | Mod: ,,, | Performed by: INTERNAL MEDICINE

## 2023-08-23 PROCEDURE — 83540 ASSAY OF IRON: CPT | Performed by: INTERNAL MEDICINE

## 2023-08-23 PROCEDURE — 81003 URINALYSIS AUTO W/O SCOPE: CPT | Mod: 59 | Performed by: STUDENT IN AN ORGANIZED HEALTH CARE EDUCATION/TRAINING PROGRAM

## 2023-08-23 PROCEDURE — 73030 X-RAY EXAM OF SHOULDER: CPT | Mod: TC,RT

## 2023-08-23 PROCEDURE — 99285 EMERGENCY DEPT VISIT HI MDM: CPT | Mod: 25

## 2023-08-23 PROCEDURE — 80307 DRUG TEST PRSMV CHEM ANLYZR: CPT | Performed by: STUDENT IN AN ORGANIZED HEALTH CARE EDUCATION/TRAINING PROGRAM

## 2023-08-23 PROCEDURE — 84466 ASSAY OF TRANSFERRIN: CPT | Performed by: INTERNAL MEDICINE

## 2023-08-23 RX ORDER — DEXTROSE MONOHYDRATE, SODIUM CHLORIDE, AND POTASSIUM CHLORIDE 50; 1.49; 9 G/1000ML; G/1000ML; G/1000ML
INJECTION, SOLUTION INTRAVENOUS CONTINUOUS
Status: DISCONTINUED | OUTPATIENT
Start: 2023-08-23 | End: 2023-08-24

## 2023-08-23 RX ORDER — FOLIC ACID 1 MG/1
1 TABLET ORAL DAILY
Status: DISCONTINUED | OUTPATIENT
Start: 2023-08-24 | End: 2023-08-24 | Stop reason: HOSPADM

## 2023-08-23 RX ORDER — SODIUM CHLORIDE 0.9 % (FLUSH) 0.9 %
10 SYRINGE (ML) INJECTION EVERY 12 HOURS PRN
Status: DISCONTINUED | OUTPATIENT
Start: 2023-08-23 | End: 2023-08-24 | Stop reason: HOSPADM

## 2023-08-23 RX ORDER — ASPIRIN 325 MG
325 TABLET ORAL ONCE
Status: COMPLETED | OUTPATIENT
Start: 2023-08-23 | End: 2023-08-23

## 2023-08-23 RX ORDER — LORAZEPAM 2 MG/ML
1 INJECTION INTRAMUSCULAR EVERY 4 HOURS PRN
Status: DISCONTINUED | OUTPATIENT
Start: 2023-08-23 | End: 2023-08-24 | Stop reason: HOSPADM

## 2023-08-23 RX ORDER — NALOXONE HCL 0.4 MG/ML
0.02 VIAL (ML) INJECTION
Status: DISCONTINUED | OUTPATIENT
Start: 2023-08-23 | End: 2023-08-24 | Stop reason: HOSPADM

## 2023-08-23 RX ORDER — THIAMINE HCL 100 MG
100 TABLET ORAL DAILY
Status: DISCONTINUED | OUTPATIENT
Start: 2023-08-24 | End: 2023-08-24 | Stop reason: HOSPADM

## 2023-08-23 RX ORDER — ATORVASTATIN CALCIUM 10 MG/1
20 TABLET, FILM COATED ORAL NIGHTLY
Status: DISCONTINUED | OUTPATIENT
Start: 2023-08-23 | End: 2023-08-24 | Stop reason: HOSPADM

## 2023-08-23 RX ORDER — IBUPROFEN 200 MG
24 TABLET ORAL
Status: DISCONTINUED | OUTPATIENT
Start: 2023-08-23 | End: 2023-08-24 | Stop reason: HOSPADM

## 2023-08-23 RX ORDER — CLOPIDOGREL BISULFATE 75 MG/1
75 TABLET ORAL NIGHTLY
Status: DISCONTINUED | OUTPATIENT
Start: 2023-08-24 | End: 2023-08-24

## 2023-08-23 RX ORDER — ASPIRIN 325 MG
325 TABLET ORAL
Status: COMPLETED | OUTPATIENT
Start: 2023-08-23 | End: 2023-08-23

## 2023-08-23 RX ORDER — TAMSULOSIN HYDROCHLORIDE 0.4 MG/1
0.4 CAPSULE ORAL NIGHTLY
Status: DISCONTINUED | OUTPATIENT
Start: 2023-08-23 | End: 2023-08-24 | Stop reason: HOSPADM

## 2023-08-23 RX ORDER — CLOPIDOGREL BISULFATE 75 MG/1
300 TABLET ORAL ONCE
Status: COMPLETED | OUTPATIENT
Start: 2023-08-23 | End: 2023-08-23

## 2023-08-23 RX ORDER — PROCHLORPERAZINE EDISYLATE 5 MG/ML
5 INJECTION INTRAMUSCULAR; INTRAVENOUS EVERY 6 HOURS PRN
Status: DISCONTINUED | OUTPATIENT
Start: 2023-08-23 | End: 2023-08-24 | Stop reason: HOSPADM

## 2023-08-23 RX ORDER — LABETALOL HYDROCHLORIDE 5 MG/ML
10 INJECTION, SOLUTION INTRAVENOUS
Status: DISCONTINUED | OUTPATIENT
Start: 2023-08-23 | End: 2023-08-24 | Stop reason: HOSPADM

## 2023-08-23 RX ORDER — IPRATROPIUM BROMIDE AND ALBUTEROL SULFATE 2.5; .5 MG/3ML; MG/3ML
3 SOLUTION RESPIRATORY (INHALATION) EVERY 6 HOURS PRN
Status: DISCONTINUED | OUTPATIENT
Start: 2023-08-23 | End: 2023-08-24 | Stop reason: HOSPADM

## 2023-08-23 RX ORDER — ONDANSETRON 2 MG/ML
4 INJECTION INTRAMUSCULAR; INTRAVENOUS EVERY 8 HOURS PRN
Status: DISCONTINUED | OUTPATIENT
Start: 2023-08-23 | End: 2023-08-24 | Stop reason: HOSPADM

## 2023-08-23 RX ORDER — GLUCAGON 1 MG
1 KIT INJECTION
Status: DISCONTINUED | OUTPATIENT
Start: 2023-08-23 | End: 2023-08-24 | Stop reason: HOSPADM

## 2023-08-23 RX ORDER — ASPIRIN 81 MG/1
81 TABLET ORAL NIGHTLY
Status: DISCONTINUED | OUTPATIENT
Start: 2023-08-24 | End: 2023-08-24

## 2023-08-23 RX ORDER — IBUPROFEN 200 MG
16 TABLET ORAL
Status: DISCONTINUED | OUTPATIENT
Start: 2023-08-23 | End: 2023-08-24 | Stop reason: HOSPADM

## 2023-08-23 RX ORDER — SIMETHICONE 80 MG
1 TABLET,CHEWABLE ORAL 4 TIMES DAILY PRN
Status: DISCONTINUED | OUTPATIENT
Start: 2023-08-23 | End: 2023-08-24 | Stop reason: HOSPADM

## 2023-08-23 RX ORDER — FERROUS SULFATE, DRIED 160(50) MG
1 TABLET, EXTENDED RELEASE ORAL 2 TIMES DAILY
Status: DISCONTINUED | OUTPATIENT
Start: 2023-08-23 | End: 2023-08-24 | Stop reason: HOSPADM

## 2023-08-23 RX ORDER — ACETAMINOPHEN 325 MG/1
650 TABLET ORAL EVERY 4 HOURS PRN
Status: DISCONTINUED | OUTPATIENT
Start: 2023-08-23 | End: 2023-08-24 | Stop reason: HOSPADM

## 2023-08-23 RX ORDER — AMOXICILLIN 250 MG
1 CAPSULE ORAL 2 TIMES DAILY PRN
Status: DISCONTINUED | OUTPATIENT
Start: 2023-08-23 | End: 2023-08-24 | Stop reason: HOSPADM

## 2023-08-23 RX ORDER — SODIUM CHLORIDE 0.9 % (FLUSH) 0.9 %
10 SYRINGE (ML) INJECTION
Status: DISCONTINUED | OUTPATIENT
Start: 2023-08-23 | End: 2023-08-24 | Stop reason: HOSPADM

## 2023-08-23 RX ORDER — MAG HYDROX/ALUMINUM HYD/SIMETH 200-200-20
30 SUSPENSION, ORAL (FINAL DOSE FORM) ORAL 4 TIMES DAILY PRN
Status: DISCONTINUED | OUTPATIENT
Start: 2023-08-23 | End: 2023-08-24 | Stop reason: HOSPADM

## 2023-08-23 RX ORDER — POLYETHYLENE GLYCOL 3350 17 G/17G
17 POWDER, FOR SOLUTION ORAL DAILY
Status: DISCONTINUED | OUTPATIENT
Start: 2023-08-24 | End: 2023-08-24 | Stop reason: HOSPADM

## 2023-08-23 RX ADMIN — ATORVASTATIN CALCIUM 20 MG: 10 TABLET, FILM COATED ORAL at 09:08

## 2023-08-23 RX ADMIN — TAMSULOSIN HYDROCHLORIDE 0.4 MG: 0.4 CAPSULE ORAL at 09:08

## 2023-08-23 RX ADMIN — ASPIRIN 325 MG: 325 TABLET ORAL at 11:08

## 2023-08-23 RX ADMIN — ASPIRIN 325 MG ORAL TABLET 325 MG: 325 PILL ORAL at 09:08

## 2023-08-23 RX ADMIN — CLOPIDOGREL BISULFATE 300 MG: 75 TABLET ORAL at 10:08

## 2023-08-23 RX ADMIN — ACETAMINOPHEN 650 MG: 325 TABLET ORAL at 10:08

## 2023-08-23 RX ADMIN — FOLIC ACID: 5 INJECTION, SOLUTION INTRAMUSCULAR; INTRAVENOUS; SUBCUTANEOUS at 07:08

## 2023-08-23 NOTE — ED PROVIDER NOTES
Encounter Date: 2023       History     Chief Complaint   Patient presents with    Fall     EMS states patient was found prone on the ground when family came to check on him.  Family states they had talked to the patient about an hour before they found him.  EMS states patient has ETOH on board and patient has an abrasion to his forehead.  Patient states he drank a pint of whiskey today.     89-year-old male with history of bone cancer, BPH, hypertension, prostate cancer, chronic alcohol use, presents to ED via EMS for evaluation of altered mental status.  Per reports patient was found outside face down in the yard at intoxicated.  It is unknown how long patient had been lying there.  Upon arrival patient is clinically intoxicated, eyes open, protecting his airway.    The history is provided by the EMS personnel. No  was used.     Review of patient's allergies indicates:  No Known Allergies  Past Medical History:   Diagnosis Date    Bone cancer     left hip    BPH (benign prostatic hyperplasia)     Cancer     Hyperlipidemia     Hypertension     Prostate cancer      Past Surgical History:   Procedure Laterality Date    COLONOSCOPY      aprox     COLONOSCOPY N/A 2020    Procedure: COLONOSCOPY;  Surgeon: Jersey Adam MD;  Location: [facility];  Service: General;  Laterality: N/A;    TONSILLECTOMY       Family History   Problem Relation Age of Onset    Cancer Brother     Heart disease Daughter      Social History     Tobacco Use    Smoking status: Former     Current packs/day: 0.00     Types: Cigarettes     Quit date:      Years since quittin.6    Smokeless tobacco: Current     Types: Chew   Substance Use Topics    Alcohol use: Yes     Alcohol/week: 1.0 standard drink of alcohol     Types: 1 Shots of liquor per week     Comment: daily    Drug use: Never     Review of Systems   Reason unable to perform ROS: Intoxicated intoxicated patient unable to obtain review of  systems.       Physical Exam     Initial Vitals [08/23/23 1612]   BP Pulse Resp Temp SpO2   (!) 105/57 77 14 98.1 °F (36.7 °C) 95 %      MAP       --         Physical Exam    Constitutional: He appears well-developed.   HENT:   Right Ear: External ear normal.   Left Ear: External ear normal.   Nose: Nose normal.   Mouth/Throat: Oropharynx is clear and moist.   Contusion to left forehead with mild abrasion   Eyes: Pupils are equal, round, and reactive to light.   Neck: Neck supple.   Cardiovascular:  Normal rate.     Exam reveals no gallop and no friction rub.       No murmur heard.  Pulmonary/Chest: Breath sounds normal.   Abdominal: Abdomen is soft. Bowel sounds are normal. He exhibits no distension. There is no rebound and no guarding.   Musculoskeletal:      Cervical back: Neck supple.      Comments: Or grimace to palpation of anterior right shoulder     Neurological:   Confused, clinically intoxicated alcohol.  No obvious lateralizing signs noted.  Exam is difficult secondary to intoxication.   Skin: Skin is warm. Capillary refill takes less than 2 seconds.         ED Course   Critical Care    Date/Time: 8/23/2023 6:53 PM    Performed by: Issac Gabriel MD  Authorized by: Issac Gabriel MD  Direct patient critical care time: 10 minutes  Additional history critical care time: 10 minutes  Documentation critical care time: 10 minutes  Consulting other physicians critical care time: 5 minutes  Total critical care time (exclusive of procedural time) : 35 minutes  Critical care time was exclusive of separately billable procedures and treating other patients.  Critical care was necessary to treat or prevent imminent or life-threatening deterioration of the following conditions: dehydration and trauma.        Labs Reviewed   CBC W/ AUTO DIFFERENTIAL - Abnormal; Notable for the following components:       Result Value    RBC 2.88 (*)     Hemoglobin 8.5 (*)     Hematocrit 25.7 (*)     RDW 16.5 (*)     MPV 8.4 (*)      Immature Granulocytes 2.3 (*)     Immature Grans (Abs) 0.13 (*)     Lymph # 0.9 (*)     Lymph % 15.8 (*)     All other components within normal limits   COMPREHENSIVE METABOLIC PANEL - Abnormal; Notable for the following components:    Chloride 114 (*)     CO2 19 (*)     Calcium 8.1 (*)     Total Protein 5.5 (*)     Albumin 3.1 (*)     Alkaline Phosphatase 39 (*)     All other components within normal limits   ACETAMINOPHEN LEVEL - Abnormal; Notable for the following components:    Acetaminophen (Tylenol), Serum <3.0 (*)     All other components within normal limits   ALCOHOL,MEDICAL (ETHANOL) - Abnormal; Notable for the following components:    Alcohol, Serum 180 (*)     All other components within normal limits   CK - Abnormal; Notable for the following components:    CPK 16 (*)     All other components within normal limits   URINALYSIS    Narrative:     Collection Type->Urine, Clean Catch  Specimen Source->Urine   DRUG SCREEN PANEL, URINE EMERGENCY    Narrative:     Collection Type->Urine, Clean Catch  Specimen Source->Urine   APTT   LIPASE   MAGNESIUM   TROPONIN I   CK   POCT GLUCOSE   POCT GLUCOSE MONITORING CONTINUOUS          Imaging Results              X-Ray Shoulder Trauma Right (Final result)  Result time 08/23/23 17:58:29      Final result by Constantine Dixon IV, MD (08/23/23 17:58:29)                   Impression:      See above      Electronically signed by: Constantine Dixon MD  Date:    08/23/2023  Time:    17:58               Narrative:    EXAMINATION:  XR SHOULDER TRAUMA 3 VIEW RIGHT    CLINICAL HISTORY:  Pain in unspecified shoulder    TECHNIQUE:  Three or four views of the right shoulder were performed.    COMPARISON:  None    FINDINGS:  Well corticated osseous fragments are noted at the acromial clavicular articulation superiorly with the largest well corticated fragment of 9 mm suggestive of prior chip injury.  No obvious acute fracture or dislocation is noted.  Osseous demineralization is  noted.  The cardiac silhouette appears partially visualized and enlarged                                       CT CERVICAL SPINE WITHOUT CONTRAST (Final result)  Result time 08/23/23 16:58:00      Final result by Dahlia Wood MD (08/23/23 16:58:00)                   Impression:      Extensive degenerative changes with no acute traumatic abnormality.      Electronically signed by: Dahlia Wood  Date:    08/23/2023  Time:    16:58               Narrative:    EXAMINATION:  CT CERVICAL SPINE WITHOUT CONTRAST    CLINICAL HISTORY:  Neck pain, acute, no red flags;fall    TECHNIQUE:  Low dose axial images, sagittal and coronal reformations were performed though the cervical spine.  Contrast was not administered.    COMPARISON:  None    FINDINGS:  There is no hematoma formation in the soft tissues in the neck.  There is calcific plaque in the carotid arteries.  There is an old, healed fracture of the left clavicle.    The skull base is intact.    There is no fracture in the cervical spine.    There is anterolisthesis of C3 on C4 and C4 on C5.  There are extensive degenerative changes at the disc spaces and facet joints throughout the cervical spine.  This particularly involves the C5/6 and C6/7 disc spaces, where there is a moderate degree of spinal stenosis present.  There is multi level neural foraminal narrowing.                                       CT Head Without Contrast (Final result)  Result time 08/23/23 16:48:44      Final result by Dahlia Wood MD (08/23/23 16:48:44)                   Impression:      Atrophy and chronic microvascular ischemia.  Mild sinus disease.      Electronically signed by: Dahlia Wood  Date:    08/23/2023  Time:    16:48               Narrative:    EXAMINATION:  CT HEAD WITHOUT CONTRAST    CLINICAL HISTORY:  Neuro deficit, acute, stroke suspected;Head trauma, abnormal mental status (Age 19-64y);    TECHNIQUE:  Low dose axial images were obtained through the  head.  Coronal and sagittal reformations were also performed. Contrast was not administered.    COMPARISON:  None    FINDINGS:  There is no intra or extra-axial hemorrhage.  No mass effect, edema or midline shift is present.  There is generalized cerebral and cerebellar atrophy with moderately severe periventricular low densities of chronic microvascular ischemia.  There is intracranial atherosclerosis.  Incidental basal ganglia calcification.    Mucosal thickening throughout the ethmoid air cells and frontal sinus.  Bony calvarium intact.                                       X-Ray Chest AP Portable (Final result)  Result time 08/23/23 16:46:55      Final result by Dahlia Wood MD (08/23/23 16:46:55)                   Impression:      Cardiomegaly with no acute pulmonary process.      Electronically signed by: Dahlia Wood  Date:    08/23/2023  Time:    16:46               Narrative:    EXAMINATION:  XR CHEST AP PORTABLE    CLINICAL HISTORY:  Chest Pain;    TECHNIQUE:  Single frontal view of the chest was performed.    COMPARISON:  04/08/2019    FINDINGS:  The heart is enlarged and unchanged.  Prominence of the hilar vessels is a stable finding.  The aorta is ectatic.  The lungs are currently clear.  There is no pleural effusion.  There are multiple old left rib fractures as well as an old left mid shaft clavicle fracture.                                       Medications - No data to display  Medical Decision Making  89-year-old presents with altered mental status, clinically intoxicated smell of alcohol  CT head without contrast showed no acute findings,  CT cervical spine showed no fracture or dislocation.  Chest x-ray shows cardiomegaly  X-ray right shoulder shows old injury no acute injury.  Alcohol level is elevated 180,  Basic lab work show hemoglobin of 8.5, baseline 10.  Otherwise no significant abnormalities   Patient was re-evaluated, he sobered up a little bit and he seems to have some  weakness in the right upper and lower extremities compared to the left  Plan is to admit patient to inpatient medicine for further workup especially concerning weakness in the right side, possible MRI.  Case transitioned to oncoming attending at shift change.      Amount and/or Complexity of Data Reviewed  Labs: ordered.  Radiology: ordered.    Risk  Decision regarding hospitalization.                               Clinical Impression:   Final diagnoses:  [R05.9] Cough  [M25.519] Shoulder pain, acute  [M25.519] Shoulder pain, acute - fall s/p alcohol intoxication  [W19.XXXA] Fall, initial encounter (Primary)  [R53.1] Acute right-sided weakness        ED Disposition Condition    Admit Stable                Issac Gabriel MD  08/24/23 1955

## 2023-08-24 VITALS
BODY MASS INDEX: 25.65 KG/M2 | RESPIRATION RATE: 18 BRPM | HEIGHT: 71 IN | OXYGEN SATURATION: 96 % | TEMPERATURE: 98 F | SYSTOLIC BLOOD PRESSURE: 137 MMHG | HEART RATE: 90 BPM | WEIGHT: 183.19 LBS | DIASTOLIC BLOOD PRESSURE: 74 MMHG

## 2023-08-24 LAB
ALBUMIN SERPL BCP-MCNC: 3.2 G/DL (ref 3.5–5.2)
ALP SERPL-CCNC: 46 U/L (ref 55–135)
ALT SERPL W/O P-5'-P-CCNC: 17 U/L (ref 10–44)
ANION GAP SERPL CALC-SCNC: 8 MMOL/L (ref 8–16)
APTT PPP: 25.9 SEC (ref 21–32)
AST SERPL-CCNC: 19 U/L (ref 10–40)
BASOPHILS # BLD AUTO: 0.04 K/UL (ref 0–0.2)
BASOPHILS NFR BLD: 0.6 % (ref 0–1.9)
BILIRUB SERPL-MCNC: 0.4 MG/DL (ref 0.1–1)
BUN SERPL-MCNC: 13 MG/DL (ref 8–23)
CALCIUM SERPL-MCNC: 8.1 MG/DL (ref 8.7–10.5)
CHLORIDE SERPL-SCNC: 114 MMOL/L (ref 95–110)
CHOLEST SERPL-MCNC: 158 MG/DL (ref 120–199)
CHOLEST/HDLC SERPL: 3.2 {RATIO} (ref 2–5)
CO2 SERPL-SCNC: 19 MMOL/L (ref 23–29)
CREAT SERPL-MCNC: 1.1 MG/DL (ref 0.5–1.4)
DIFFERENTIAL METHOD: ABNORMAL
EOSINOPHIL # BLD AUTO: 0.1 K/UL (ref 0–0.5)
EOSINOPHIL NFR BLD: 1.3 % (ref 0–8)
ERYTHROCYTE [DISTWIDTH] IN BLOOD BY AUTOMATED COUNT: 17.1 % (ref 11.5–14.5)
EST. GFR  (NO RACE VARIABLE): >60 ML/MIN/1.73 M^2
FERRITIN SERPL-MCNC: 185 NG/ML (ref 20–300)
FOLATE SERPL-MCNC: >40 NG/ML (ref 4–24)
GLUCOSE SERPL-MCNC: 99 MG/DL (ref 70–110)
HCT VFR BLD AUTO: 28.6 % (ref 40–54)
HDLC SERPL-MCNC: 50 MG/DL (ref 40–75)
HDLC SERPL: 31.6 % (ref 20–50)
HGB BLD-MCNC: 9.3 G/DL (ref 14–18)
IMM GRANULOCYTES # BLD AUTO: 0.16 K/UL (ref 0–0.04)
IMM GRANULOCYTES NFR BLD AUTO: 2.5 % (ref 0–0.5)
INR PPP: 1 (ref 0.8–1.2)
LDLC SERPL CALC-MCNC: 88.4 MG/DL (ref 63–159)
LYMPHOCYTES # BLD AUTO: 1.2 K/UL (ref 1–4.8)
LYMPHOCYTES NFR BLD: 18.9 % (ref 18–48)
MAGNESIUM SERPL-MCNC: 2.2 MG/DL (ref 1.6–2.6)
MCH RBC QN AUTO: 28.9 PG (ref 27–31)
MCHC RBC AUTO-ENTMCNC: 32.5 G/DL (ref 32–36)
MCV RBC AUTO: 89 FL (ref 82–98)
MONOCYTES # BLD AUTO: 1 K/UL (ref 0.3–1)
MONOCYTES NFR BLD: 14.9 % (ref 4–15)
NEUTROPHILS # BLD AUTO: 4 K/UL (ref 1.8–7.7)
NEUTROPHILS NFR BLD: 61.8 % (ref 38–73)
NONHDLC SERPL-MCNC: 108 MG/DL
NRBC BLD-RTO: 0 /100 WBC
PHOSPHATE SERPL-MCNC: 2.5 MG/DL (ref 2.7–4.5)
PLATELET # BLD AUTO: 224 K/UL (ref 150–450)
PMV BLD AUTO: 9.1 FL (ref 9.2–12.9)
POTASSIUM SERPL-SCNC: 3.9 MMOL/L (ref 3.5–5.1)
PROT SERPL-MCNC: 5.8 G/DL (ref 6–8.4)
PROTHROMBIN TIME: 10.5 SEC (ref 9–12.5)
RBC # BLD AUTO: 3.22 M/UL (ref 4.6–6.2)
SODIUM SERPL-SCNC: 141 MMOL/L (ref 136–145)
TRIGL SERPL-MCNC: 98 MG/DL (ref 30–150)
TROPONIN I SERPL DL<=0.01 NG/ML-MCNC: 0.01 NG/ML (ref 0–0.03)
VIT B12 SERPL-MCNC: 516 PG/ML (ref 210–950)
WBC # BLD AUTO: 6.39 K/UL (ref 3.9–12.7)

## 2023-08-24 PROCEDURE — 25000003 PHARM REV CODE 250: Performed by: INTERNAL MEDICINE

## 2023-08-24 PROCEDURE — 97530 THERAPEUTIC ACTIVITIES: CPT

## 2023-08-24 PROCEDURE — 99238 HOSP IP/OBS DSCHRG MGMT 30/<: CPT | Mod: ,,, | Performed by: STUDENT IN AN ORGANIZED HEALTH CARE EDUCATION/TRAINING PROGRAM

## 2023-08-24 PROCEDURE — 84100 ASSAY OF PHOSPHORUS: CPT | Performed by: INTERNAL MEDICINE

## 2023-08-24 PROCEDURE — 97166 OT EVAL MOD COMPLEX 45 MIN: CPT

## 2023-08-24 PROCEDURE — G0378 HOSPITAL OBSERVATION PER HR: HCPCS

## 2023-08-24 PROCEDURE — 84484 ASSAY OF TROPONIN QUANT: CPT | Performed by: INTERNAL MEDICINE

## 2023-08-24 PROCEDURE — 94761 N-INVAS EAR/PLS OXIMETRY MLT: CPT

## 2023-08-24 PROCEDURE — 85730 THROMBOPLASTIN TIME PARTIAL: CPT | Performed by: INTERNAL MEDICINE

## 2023-08-24 PROCEDURE — 80053 COMPREHEN METABOLIC PANEL: CPT | Performed by: INTERNAL MEDICINE

## 2023-08-24 PROCEDURE — 80061 LIPID PANEL: CPT | Performed by: INTERNAL MEDICINE

## 2023-08-24 PROCEDURE — 83735 ASSAY OF MAGNESIUM: CPT | Performed by: INTERNAL MEDICINE

## 2023-08-24 PROCEDURE — 92610 EVALUATE SWALLOWING FUNCTION: CPT

## 2023-08-24 PROCEDURE — 97162 PT EVAL MOD COMPLEX 30 MIN: CPT

## 2023-08-24 PROCEDURE — 25000003 PHARM REV CODE 250: Performed by: STUDENT IN AN ORGANIZED HEALTH CARE EDUCATION/TRAINING PROGRAM

## 2023-08-24 PROCEDURE — 92523 SPEECH SOUND LANG COMPREHEN: CPT

## 2023-08-24 PROCEDURE — 85610 PROTHROMBIN TIME: CPT | Performed by: INTERNAL MEDICINE

## 2023-08-24 PROCEDURE — 27000221 HC OXYGEN, UP TO 24 HOURS

## 2023-08-24 PROCEDURE — 85025 COMPLETE CBC W/AUTO DIFF WBC: CPT | Performed by: INTERNAL MEDICINE

## 2023-08-24 PROCEDURE — 99238 PR HOSPITAL DISCHARGE DAY,<30 MIN: ICD-10-PCS | Mod: ,,, | Performed by: STUDENT IN AN ORGANIZED HEALTH CARE EDUCATION/TRAINING PROGRAM

## 2023-08-24 PROCEDURE — 25500020 PHARM REV CODE 255: Performed by: STUDENT IN AN ORGANIZED HEALTH CARE EDUCATION/TRAINING PROGRAM

## 2023-08-24 PROCEDURE — 96361 HYDRATE IV INFUSION ADD-ON: CPT

## 2023-08-24 RX ORDER — SODIUM,POTASSIUM PHOSPHATES 280-250MG
2 POWDER IN PACKET (EA) ORAL ONCE
Status: COMPLETED | OUTPATIENT
Start: 2023-08-24 | End: 2023-08-24

## 2023-08-24 RX ADMIN — FOLIC ACID 1 MG: 1 TABLET ORAL at 08:08

## 2023-08-24 RX ADMIN — POTASSIUM CHLORIDE, DEXTROSE MONOHYDRATE AND SODIUM CHLORIDE: 150; 5; 900 INJECTION, SOLUTION INTRAVENOUS at 12:08

## 2023-08-24 RX ADMIN — THERA TABS 1 TABLET: TAB at 08:08

## 2023-08-24 RX ADMIN — Medication 100 MG: at 08:08

## 2023-08-24 RX ADMIN — IOHEXOL 75 ML: 350 INJECTION, SOLUTION INTRAVENOUS at 12:08

## 2023-08-24 RX ADMIN — Medication 1 TABLET: at 10:08

## 2023-08-24 RX ADMIN — POTASSIUM & SODIUM PHOSPHATES POWDER PACK 280-160-250 MG 2 PACKET: 280-160-250 PACK at 11:08

## 2023-08-24 NOTE — SUBJECTIVE & OBJECTIVE
Past Medical History:   Diagnosis Date    BPH (benign prostatic hyperplasia)     Hyperlipidemia     Hypertension     Prostate cancer     He was diagnosed in 2010.  He never received definitive treatment, but received androgen deprivation.  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  His PSA has risen on Lupron and Xtandi.  He is now being switched to Xgeva.    Prostate cancer metastatic to bone     He does complain of left hip pain, which has been worsening over the last couple of months.  This is consistent with his bone scan and CT findings.  I recommended palliative radiation treatment to the left acetabulum.       Past Surgical History:   Procedure Laterality Date    COLONOSCOPY      aprox 2008    COLONOSCOPY N/A 7/27/2020    Procedure: COLONOSCOPY;  Surgeon: Jersey Adam MD;  Location: [facility];  Service: General;  Laterality: N/A;    TONSILLECTOMY         Review of patient's allergies indicates:  No Known Allergies    No current facility-administered medications on file prior to encounter.     Current Outpatient Medications on File Prior to Encounter   Medication Sig    amLODIPine (NORVASC) 10 MG tablet Take 1 tablet (10 mg total) by mouth once daily.    amoxicillin-clavulanate 875-125mg (AUGMENTIN) 875-125 mg per tablet Take 1 tablet by mouth 2 (two) times daily.    bisacodyL (DULCOLAX) 10 mg Supp Place 1 suppository (10 mg total) rectally daily as needed (for constipation).    calcium-vitamin D 600 mg-10 mcg (400 unit) Tab 1 tab, Oral, BID, # 180 tab, 3 Refill(s), Pharmacy: [place], [place] MAIL ONLY, 180.34, cm, 11/04/22 8:23:00 CDT, Height/Length Measured, 87.1, kg, 11/04/22 8:23:00 CDT, Weight Dosing    docusate sodium (COLACE) 100 MG capsule Take 1 capsule (100 mg total) by mouth 2 (two) times daily.    gabapentin (NEURONTIN) 300 MG capsule Take 1 capsule (300 mg total) by mouth 3 (three) times daily.    oxyCODONE-acetaminophen (PERCOCET)  mg per tablet Take 1 tablet by  mouth every 4 (four) hours as needed.    predniSONE (DELTASONE) 5 MG tablet Take 5 mg by mouth 2 (two) times daily.    simvastatin (ZOCOR) 40 MG tablet Take 1 tablet (40 mg total) by mouth every evening.    tamsulosin (FLOMAX) 0.4 mg Cap Take 1 capsule (0.4 mg total) by mouth every evening.     Family History       Problem Relation (Age of Onset)    Cancer Brother    Heart disease Daughter          Tobacco Use    Smoking status: Former     Current packs/day: 0.00     Types: Cigarettes     Quit date:      Years since quittin.6    Smokeless tobacco: Current     Types: Chew   Substance and Sexual Activity    Alcohol use: Yes     Alcohol/week: 1.0 standard drink of alcohol     Types: 1 Shots of liquor per week     Comment: daily    Drug use: Never    Sexual activity: Not Currently     Review of Systems   Constitutional:  Positive for activity change. Negative for fatigue and fever.   HENT:  Negative for congestion.    Eyes:  Negative for visual disturbance.   Respiratory:  Negative for shortness of breath.    Cardiovascular:  Negative for chest pain.   Gastrointestinal:  Negative for diarrhea, nausea and vomiting.   Endocrine: Positive for heat intolerance.   Genitourinary:  Positive for difficulty urinating.   Musculoskeletal:  Positive for arthralgias.        + Right shoulder pain   Skin:  Positive for wound.   Allergic/Immunologic: Negative for immunocompromised state.   Neurological:  Positive for syncope, light-headedness and numbness. Negative for dizziness and headaches.   Hematological:  Does not bruise/bleed easily.   Psychiatric/Behavioral:  Positive for confusion.      Objective:     Vital Signs (Most Recent):  Temp: 98.6 °F (37 °C) (23)  Pulse: 82 (23)  Resp: (!) 22 (23)  BP: 116/68 (23)  SpO2: 99 % (23) Vital Signs (24h Range):  Temp:  [98.1 °F (36.7 °C)-98.6 °F (37 °C)] 98.6 °F (37 °C)  Pulse:  [77-89] 82  Resp:  [14-22] 22  SpO2:  [95 %-99 %]  99 %  BP: (105-128)/(57-71) 116/68     Weight: 83.9 kg (185 lb)  Body mass index is 25.8 kg/m².     Physical Exam  Constitutional:       General: He is not in acute distress.     Appearance: He is overweight. He is not ill-appearing, toxic-appearing or diaphoretic.   HENT:      Head:      Comments: Abrasion to forehead, see below  Pulmonary:      Effort: No tachypnea or bradypnea.   Abdominal:      General: Abdomen is protuberant.   Genitourinary:     Comments: No garcia in place  Musculoskeletal:      Comments: Right shoulder pain with any manipulation   Neurological:      Mental Status: He is alert and oriented to person, place, and time.      GCS: GCS eye subscore is 4. GCS verbal subscore is 5. GCS motor subscore is 6.      Motor: Weakness and abnormal muscle tone present.      Coordination: Romberg sign positive. Finger-Nose-Finger Test abnormal.      Comments: I asked Dr. Oconnor and the nurse to assist with Neuro exam - right arm is weak at 4/5 with biceps,  and shoulder, left is normal.  Right leg is 4-5/5 strength, finger to nose is off on the left as well.  Of note, severe shoulder pain limits the exam some.   Psychiatric:         Attention and Perception: Attention and perception normal.         Cognition and Memory: Cognition and memory normal.                Significant Labs: All pertinent labs within the past 24 hours have been reviewed.  Recent Results (from the past 24 hour(s))   POCT glucose    Collection Time: 08/23/23  4:06 PM   Result Value Ref Range    POCT Glucose 104 70 - 110 mg/dL   CBC auto differential    Collection Time: 08/23/23  4:17 PM   Result Value Ref Range    WBC 5.57 3.90 - 12.70 K/uL    RBC 2.88 (L) 4.60 - 6.20 M/uL    Hemoglobin 8.5 (L) 14.0 - 18.0 g/dL    Hematocrit 25.7 (L) 40.0 - 54.0 %    MCV 89 82 - 98 fL    MCH 29.5 27.0 - 31.0 pg    MCHC 33.1 32.0 - 36.0 g/dL    RDW 16.5 (H) 11.5 - 14.5 %    Platelets 177 150 - 450 K/uL    MPV 8.4 (L) 9.2 - 12.9 fL    Immature  Granulocytes 2.3 (H) 0.0 - 0.5 %    Gran # (ANC) 4.0 1.8 - 7.7 K/uL    Immature Grans (Abs) 0.13 (H) 0.00 - 0.04 K/uL    Lymph # 0.9 (L) 1.0 - 4.8 K/uL    Mono # 0.5 0.3 - 1.0 K/uL    Eos # 0.0 0.0 - 0.5 K/uL    Baso # 0.01 0.00 - 0.20 K/uL    nRBC 0 0 /100 WBC    Gran % 72.5 38.0 - 73.0 %    Lymph % 15.8 (L) 18.0 - 48.0 %    Mono % 9.0 4.0 - 15.0 %    Eosinophil % 0.2 0.0 - 8.0 %    Basophil % 0.2 0.0 - 1.9 %    Differential Method Automated    Comprehensive metabolic panel    Collection Time: 08/23/23  4:17 PM   Result Value Ref Range    Sodium 142 136 - 145 mmol/L    Potassium 3.7 3.5 - 5.1 mmol/L    Chloride 114 (H) 95 - 110 mmol/L    CO2 19 (L) 23 - 29 mmol/L    Glucose 90 70 - 110 mg/dL    BUN 13 8 - 23 mg/dL    Creatinine 1.1 0.5 - 1.4 mg/dL    Calcium 8.1 (L) 8.7 - 10.5 mg/dL    Total Protein 5.5 (L) 6.0 - 8.4 g/dL    Albumin 3.1 (L) 3.5 - 5.2 g/dL    Total Bilirubin 0.3 0.1 - 1.0 mg/dL    Alkaline Phosphatase 39 (L) 55 - 135 U/L    AST 11 10 - 40 U/L    ALT 14 10 - 44 U/L    eGFR >60.0 >60 mL/min/1.73 m^2    Anion Gap 9 8 - 16 mmol/L   Acetaminophen level    Collection Time: 08/23/23  4:17 PM   Result Value Ref Range    Acetaminophen (Tylenol), Serum <3.0 (L) 10.0 - 20.0 ug/mL   Ethanol    Collection Time: 08/23/23  4:17 PM   Result Value Ref Range    Alcohol, Serum 180 (H) 0 - 10 mg/dL   APTT    Collection Time: 08/23/23  4:17 PM   Result Value Ref Range    aPTT 26.1 21.0 - 32.0 sec   Lipase    Collection Time: 08/23/23  4:17 PM   Result Value Ref Range    Lipase 37 4 - 60 U/L   Magnesium    Collection Time: 08/23/23  4:17 PM   Result Value Ref Range    Magnesium 2.2 1.6 - 2.6 mg/dL   Troponin I    Collection Time: 08/23/23  4:17 PM   Result Value Ref Range    Troponin I <0.006 0.000 - 0.026 ng/mL   CK    Collection Time: 08/23/23  4:17 PM   Result Value Ref Range    CPK 16 (L) 20 - 200 U/L   Urinalysis - Cath.    Collection Time: 08/23/23  5:05 PM   Result Value Ref Range    Specimen UA Urine, Unspecified      Color, UA Yellow Yellow, Straw, Alejandrina    Appearance, UA Clear Clear    pH, UA 6.0 5.0 - 8.0    Specific Gravity, UA <=1.005 1.005 - 1.030    Protein, UA Negative Negative    Glucose, UA Negative Negative    Ketones, UA Negative Negative    Bilirubin (UA) Negative Negative    Occult Blood UA Negative Negative    Nitrite, UA Negative Negative    Urobilinogen, UA Negative Negative EU/dL    Leukocytes, UA Negative Negative   Drug screen panel, emergency    Collection Time: 08/23/23  5:05 PM   Result Value Ref Range    Benzodiazepines Negative Negative    Methadone metabolites Negative Negative    Cocaine (Metab.) Negative Negative    Opiate Scrn, Ur Negative Negative    Barbiturate Screen, Ur Negative Negative    Amphetamine Screen, Ur Negative Negative    THC Negative Negative    Phencyclidine Negative Negative    Creatinine, Urine 53.4 23.0 - 375.0 mg/dL    Toxicology Information SEE COMMENT          Significant Imaging: I have reviewed all pertinent imaging results/findings within the past 24 hours.

## 2023-08-24 NOTE — ASSESSMENT & PLAN NOTE
Abrasion to forehead noted  Neuro checks Q4  Holding Lovenox DVT proph  ASA risky if needed for CVA  MRI brain in am

## 2023-08-24 NOTE — H&P
"[facility] Medicine  History & Physical    Patient Name: Robert Nava  MRN: [id number]  Admission Date: 8/23/2023  Attending Physician: Dave Nieves MD   Primary Care Provider: Renetta Matos NP         Patient information was obtained from patient, past medical records and ER records.       Subjective:     Principal Problem:Syncope and collapse    Chief Complaint:   Chief Complaint   Patient presents with    Fall     EMS states patient was found prone on the ground when family came to check on him.  Family states they had talked to the patient about an hour before they found him.  EMS states patient has ETOH on board and patient has an abrasion to his forehead.  Patient states he drank a pint of whiskey today.        HPI:   Mr. Nava is a 89yo man with a past medical history of ETOH abuse, BPH, HLD, prostate cancer, and HTN.  This is his 4th ED visit in 2023.  He lives alone.  He tells me that he used to drink heavily, then stopped for the past 3 months until today.  He states this morning he drank a half pint of whiskey.      He was out in the yard earlier and passed out.  He state she remembers getting dizzy, then waking up on the ground.  He tells me he could not get up because his right side was numb and weak, especially his arm.  He also states he had some severe right shoulder pain, complicating the right arm weakness issues (reluctant to move it).     He lay on the ground about 1 hour until his daughter came over and found him down in the yard.  He denies any N/V/D, SOB or CP.  He states he has had no visual changes, double vision, or vertigo.  His right leg weakness is better, but he still feels his arm is weak.    In the ED his VS were /57 -> 128/71 (BP Location: Right arm, Patient Position: Lying)   Pulse 89   Temp 98.6 °F (37 °C) (Oral)   Resp 20   Ht 5' 11" (1.803 m)   Wt 83.9 kg (185 lb)   SpO2 95% RA -> 99% 2L NC  BMI 25.80 kg/m².  Labs showed Hg " 8.5, Cl 114, AG 9, HCO3 19, Cr 1.1, ETOH 180.  UA/UDS NEG.    CXR showed cardiomegaly with no acute pulmonary process.  Xray right shoulder showed well corticated osseous fragments are noted at the acromial clavicular articulation superiorly with the largest well corticated fragment of 9 mm suggestive of prior chip injury.  No obvious acute fracture or dislocation is noted.  Osseous demineralization is noted.    CT head without showed atrophy and chronic microvascular ischemia.  Mild sinus disease.  CT C-spine without showed extensive degenerative changes with no acute traumatic abnormality.  EKG showed sinus with 1st degree AVB, rate 80, LVH, Q V1/2, QTc 461 ms.    In the ED he was treated with:  Medications   sodium chloride 0.9% 1,000 mL with mvi, (ADULT) no.4 with vit K 3,300 unit- 150 mcg/10 mL 10 mL, thiamine 100 mg, folic acid 1 mg infusion ( Intravenous New Bag 8/23/23 1931)           Past Medical History:   Diagnosis Date    BPH (benign prostatic hyperplasia)     Hyperlipidemia     Hypertension     Prostate cancer     He was diagnosed in 2010.  He never received definitive treatment, but received androgen deprivation.  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  His PSA has risen on Lupron and Xtandi.  He is now being switched to Xgeva.    Prostate cancer metastatic to bone     He does complain of left hip pain, which has been worsening over the last couple of months.  This is consistent with his bone scan and CT findings.  I recommended palliative radiation treatment to the left acetabulum.       Past Surgical History:   Procedure Laterality Date    COLONOSCOPY      aprox 2008    COLONOSCOPY N/A 7/27/2020    Procedure: COLONOSCOPY;  Surgeon: Jersey Adam MD;  Location: [facility];  Service: General;  Laterality: N/A;    TONSILLECTOMY         Review of patient's allergies indicates:  No Known Allergies    No current facility-administered medications on file prior to encounter.     Current  Outpatient Medications on File Prior to Encounter   Medication Sig    amLODIPine (NORVASC) 10 MG tablet Take 1 tablet (10 mg total) by mouth once daily.    amoxicillin-clavulanate 875-125mg (AUGMENTIN) 875-125 mg per tablet Take 1 tablet by mouth 2 (two) times daily.    bisacodyL (DULCOLAX) 10 mg Supp Place 1 suppository (10 mg total) rectally daily as needed (for constipation).    calcium-vitamin D 600 mg-10 mcg (400 unit) Tab 1 tab, Oral, BID, # 180 tab, 3 Refill(s), Pharmacy: [place], [place] MAIL ONLY, 180.34, cm, 22 8:23:00 CDT, Height/Length Measured, 87.1, kg, 22 8:23:00 CDT, Weight Dosing    docusate sodium (COLACE) 100 MG capsule Take 1 capsule (100 mg total) by mouth 2 (two) times daily.    gabapentin (NEURONTIN) 300 MG capsule Take 1 capsule (300 mg total) by mouth 3 (three) times daily.    oxyCODONE-acetaminophen (PERCOCET)  mg per tablet Take 1 tablet by mouth every 4 (four) hours as needed.    predniSONE (DELTASONE) 5 MG tablet Take 5 mg by mouth 2 (two) times daily.    simvastatin (ZOCOR) 40 MG tablet Take 1 tablet (40 mg total) by mouth every evening.    tamsulosin (FLOMAX) 0.4 mg Cap Take 1 capsule (0.4 mg total) by mouth every evening.     Family History       Problem Relation (Age of Onset)    Cancer Brother    Heart disease Daughter          Tobacco Use    Smoking status: Former     Current packs/day: 0.00     Types: Cigarettes     Quit date:      Years since quittin.6    Smokeless tobacco: Current     Types: Chew   Substance and Sexual Activity    Alcohol use: Yes     Alcohol/week: 1.0 standard drink of alcohol     Types: 1 Shots of liquor per week     Comment: daily    Drug use: Never    Sexual activity: Not Currently     Review of Systems   Constitutional:  Positive for activity change. Negative for fatigue and fever.   HENT:  Negative for congestion.    Eyes:  Negative for visual disturbance.   Respiratory:  Negative for shortness of  breath.    Cardiovascular:  Negative for chest pain.   Gastrointestinal:  Negative for diarrhea, nausea and vomiting.   Endocrine: Positive for heat intolerance.   Genitourinary:  Positive for difficulty urinating.   Musculoskeletal:  Positive for arthralgias.        + Right shoulder pain   Skin:  Positive for wound.   Allergic/Immunologic: Negative for immunocompromised state.   Neurological:  Positive for syncope, light-headedness and numbness. Negative for dizziness and headaches.   Hematological:  Does not bruise/bleed easily.   Psychiatric/Behavioral:  Positive for confusion.      Objective:     Vital Signs (Most Recent):  Temp: 98.6 °F (37 °C) (08/23/23 1934)  Pulse: 82 (08/23/23 2038)  Resp: (!) 22 (08/23/23 2038)  BP: 116/68 (08/23/23 2038)  SpO2: 99 % (08/23/23 2038) Vital Signs (24h Range):  Temp:  [98.1 °F (36.7 °C)-98.6 °F (37 °C)] 98.6 °F (37 °C)  Pulse:  [77-89] 82  Resp:  [14-22] 22  SpO2:  [95 %-99 %] 99 %  BP: (105-128)/(57-71) 116/68     Weight: 83.9 kg (185 lb)  Body mass index is 25.8 kg/m².     Physical Exam  Constitutional:       General: He is not in acute distress.     Appearance: He is overweight. He is not ill-appearing, toxic-appearing or diaphoretic.   HENT:      Head:      Comments: Abrasion to forehead, see below  Pulmonary:      Effort: No tachypnea or bradypnea.   Abdominal:      General: Abdomen is protuberant.   Genitourinary:     Comments: No garcia in place  Musculoskeletal:      Comments: Right shoulder pain with any manipulation   Neurological:      Mental Status: He is alert and oriented to person, place, and time.      GCS: GCS eye subscore is 4. GCS verbal subscore is 5. GCS motor subscore is 6.      Motor: Weakness and abnormal muscle tone present.      Coordination: Romberg sign positive. Finger-Nose-Finger Test abnormal.      Comments: I asked Dr. Oconnor and the nurse to assist with Neuro exam - right arm is weak at 4/5 with biceps,  and shoulder, left is normal.  Right  leg is 4-5/5 strength, finger to nose is off on the left as well.  Of note, severe shoulder pain limits the exam some.   Psychiatric:         Attention and Perception: Attention and perception normal.         Cognition and Memory: Cognition and memory normal.                Significant Labs: All pertinent labs within the past 24 hours have been reviewed.  Recent Results (from the past 24 hour(s))   POCT glucose    Collection Time: 08/23/23  4:06 PM   Result Value Ref Range    POCT Glucose 104 70 - 110 mg/dL   CBC auto differential    Collection Time: 08/23/23  4:17 PM   Result Value Ref Range    WBC 5.57 3.90 - 12.70 K/uL    RBC 2.88 (L) 4.60 - 6.20 M/uL    Hemoglobin 8.5 (L) 14.0 - 18.0 g/dL    Hematocrit 25.7 (L) 40.0 - 54.0 %    MCV 89 82 - 98 fL    MCH 29.5 27.0 - 31.0 pg    MCHC 33.1 32.0 - 36.0 g/dL    RDW 16.5 (H) 11.5 - 14.5 %    Platelets 177 150 - 450 K/uL    MPV 8.4 (L) 9.2 - 12.9 fL    Immature Granulocytes 2.3 (H) 0.0 - 0.5 %    Gran # (ANC) 4.0 1.8 - 7.7 K/uL    Immature Grans (Abs) 0.13 (H) 0.00 - 0.04 K/uL    Lymph # 0.9 (L) 1.0 - 4.8 K/uL    Mono # 0.5 0.3 - 1.0 K/uL    Eos # 0.0 0.0 - 0.5 K/uL    Baso # 0.01 0.00 - 0.20 K/uL    nRBC 0 0 /100 WBC    Gran % 72.5 38.0 - 73.0 %    Lymph % 15.8 (L) 18.0 - 48.0 %    Mono % 9.0 4.0 - 15.0 %    Eosinophil % 0.2 0.0 - 8.0 %    Basophil % 0.2 0.0 - 1.9 %    Differential Method Automated    Comprehensive metabolic panel    Collection Time: 08/23/23  4:17 PM   Result Value Ref Range    Sodium 142 136 - 145 mmol/L    Potassium 3.7 3.5 - 5.1 mmol/L    Chloride 114 (H) 95 - 110 mmol/L    CO2 19 (L) 23 - 29 mmol/L    Glucose 90 70 - 110 mg/dL    BUN 13 8 - 23 mg/dL    Creatinine 1.1 0.5 - 1.4 mg/dL    Calcium 8.1 (L) 8.7 - 10.5 mg/dL    Total Protein 5.5 (L) 6.0 - 8.4 g/dL    Albumin 3.1 (L) 3.5 - 5.2 g/dL    Total Bilirubin 0.3 0.1 - 1.0 mg/dL    Alkaline Phosphatase 39 (L) 55 - 135 U/L    AST 11 10 - 40 U/L    ALT 14 10 - 44 U/L    eGFR >60.0 >60 mL/min/1.73 m^2     Anion Gap 9 8 - 16 mmol/L   Acetaminophen level    Collection Time: 08/23/23  4:17 PM   Result Value Ref Range    Acetaminophen (Tylenol), Serum <3.0 (L) 10.0 - 20.0 ug/mL   Ethanol    Collection Time: 08/23/23  4:17 PM   Result Value Ref Range    Alcohol, Serum 180 (H) 0 - 10 mg/dL   APTT    Collection Time: 08/23/23  4:17 PM   Result Value Ref Range    aPTT 26.1 21.0 - 32.0 sec   Lipase    Collection Time: 08/23/23  4:17 PM   Result Value Ref Range    Lipase 37 4 - 60 U/L   Magnesium    Collection Time: 08/23/23  4:17 PM   Result Value Ref Range    Magnesium 2.2 1.6 - 2.6 mg/dL   Troponin I    Collection Time: 08/23/23  4:17 PM   Result Value Ref Range    Troponin I <0.006 0.000 - 0.026 ng/mL   CK    Collection Time: 08/23/23  4:17 PM   Result Value Ref Range    CPK 16 (L) 20 - 200 U/L   Urinalysis - Cath.    Collection Time: 08/23/23  5:05 PM   Result Value Ref Range    Specimen UA Urine, Unspecified     Color, UA Yellow Yellow, Straw, Alejandrina    Appearance, UA Clear Clear    pH, UA 6.0 5.0 - 8.0    Specific Gravity, UA <=1.005 1.005 - 1.030    Protein, UA Negative Negative    Glucose, UA Negative Negative    Ketones, UA Negative Negative    Bilirubin (UA) Negative Negative    Occult Blood UA Negative Negative    Nitrite, UA Negative Negative    Urobilinogen, UA Negative Negative EU/dL    Leukocytes, UA Negative Negative   Drug screen panel, emergency    Collection Time: 08/23/23  5:05 PM   Result Value Ref Range    Benzodiazepines Negative Negative    Methadone metabolites Negative Negative    Cocaine (Metab.) Negative Negative    Opiate Scrn, Ur Negative Negative    Barbiturate Screen, Ur Negative Negative    Amphetamine Screen, Ur Negative Negative    THC Negative Negative    Phencyclidine Negative Negative    Creatinine, Urine 53.4 23.0 - 375.0 mg/dL    Toxicology Information SEE COMMENT          Significant Imaging: I have reviewed all pertinent imaging results/findings within the past 24  "hours.        Assessment/Plan:     * Syncope and collapse  TTE in am given possible CVA  Monitor on Telemetry  Most likely culprit would be heat prostration while outside intoxicated  Neuro checks q4 hours  No acute EKG changes or arrhythmias      Mild closed head injury  Abrasion to forehead noted  Neuro checks Q4  Holding Lovenox DVT proph  ASA risky if needed for CVA  MRI brain in am      Right arm weakness  He noted right arm and leg weakness with numbness  The numbness and right leg weakness are improved  The right arm still seems weak to me  I asked the ED to call for acute Tele-stroke consult  MRI and MRA ordered for am  TTE with bubble in am        Acute pain of right shoulder due to trauma  No findings on Xray  MRI of shoulder in am, then ortho consult based on results  PT and OT consult      Acute alcoholic intoxication with complication  Banana bag given in the ED  MVI, thiamine, Folic acid daily  CIWA q4  Ativan 1mg q4 prn CIWA > 11  Withdrawal unlikely, as first time he's drank ETOH in 3 months  D5NS with 20 meq KCl at 100 cc/hr  TSH, RPR, NH3      Hypertension  BP was low on arrival, so holding Norvasc      Hyperlipidemia  Continue statin      BPH without obstruction/lower urinary tract symptoms  Continue Flomax 0.4mg      Prostate cancer metastatic to bone  He is followed by Urology for this:  "Pt is established with Dr. Brigitte Hyatt with [facility].  1. 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   89-year-old male with a longstanding history of prostate cancer.  He was diagnosed in 2010.  He never received definitive treatment, but received androgen deprivation.  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  His PSA has risen on Lupron and Xtandi.  He is now being switched to Xgeva.  He does complain of left hip pain, which has been worsening over the last couple of months.  This is consistent with his bone scan and CT findings.  I recommended palliative " "radiation treatment to the left acetabulum.  I had a detailed discussion with the patient regarding the recommendation for treatment, the treatment course, treatment delivery, and the anticipated acute and long-term side effects.  He is in agreement.  We will bring him back for a treatment planning scan and we will try to get his treatment initiated as soon as possible."    Normocytic anemia  Ordered B12, folate, Fe studies     Latest Reference Range & Units 07/03/23 00:10 08/05/23 13:52 08/23/23 16:17   Hemoglobin 14.0 - 18.0 g/dL 11.1 (L) 10.2 (L) 8.5 (L)            VTE Risk Mitigation (From admission, onward)         Ordered     Place TAD hose  Until discontinued         08/23/23 2040     Reason for No Pharmacological VTE Prophylaxis  Once        Question:  Reasons:  Answer:  Physician Provided (leave comment)    08/23/23 2040     IP VTE HIGH RISK PATIENT  Once         08/23/23 2040     Place sequential compression device  Until discontinued         08/23/23 2040                     The attending portion of this evaluation, treatment, and documentation was performed per SEBASTIAN Cason MD via Telemedicine AudioVisual using the secure ViViFi software platform with 2 way audio/video. The provider was located off-site and the patient is located in the hospital. The aforementioned video software was utilized to document the relevant history and physical exam    On 08/23/2023, patient should be placed in hospital observation services under my care.        SEBASTIAN Cason MD  Department of Hospital Medicine   [facility] - Emergency Dept  "

## 2023-08-24 NOTE — PLAN OF CARE
08/24/23 0731   HOROWITZ Message   Medicare Outpatient and Observation Notification regarding financial responsibility Explained to patient/caregiver;Signed/date by patient/caregiver   Date HOROWITZ was signed 08/23/23   Time HOROWITZ was signed 1926

## 2023-08-24 NOTE — PLAN OF CARE
Problem: Adult Inpatient Plan of Care  Goal: Plan of Care Review  Outcome: Ongoing, Progressing  Goal: Patient-Specific Goal (Individualized)  Outcome: Ongoing, Progressing  Goal: Absence of Hospital-Acquired Illness or Injury  Outcome: Ongoing, Progressing  Goal: Optimal Comfort and Wellbeing  Outcome: Ongoing, Progressing  Goal: Readiness for Transition of Care  Outcome: Ongoing, Progressing     Problem: Adjustment to Illness (Stroke, Ischemic/Transient Ischemic Attack)  Goal: Optimal Coping  Outcome: Ongoing, Progressing     Problem: Bowel Elimination Impaired (Stroke, Ischemic/Transient Ischemic Attack)  Goal: Effective Bowel Elimination  Outcome: Ongoing, Progressing     Problem: Cerebral Tissue Perfusion (Stroke, Ischemic/Transient Ischemic Attack)  Goal: Optimal Cerebral Tissue Perfusion  Outcome: Ongoing, Progressing     Problem: Cognitive Impairment (Stroke, Ischemic/Transient Ischemic Attack)  Goal: Optimal Cognitive Function  Outcome: Ongoing, Progressing     Problem: Communication Impairment (Stroke, Ischemic/Transient Ischemic Attack)  Goal: Improved Communication Skills  Outcome: Ongoing, Progressing     Problem: Functional Ability Impaired (Stroke, Ischemic/Transient Ischemic Attack)  Goal: Optimal Functional Ability  Outcome: Ongoing, Progressing     Problem: Respiratory Compromise (Stroke, Ischemic/Transient Ischemic Attack)  Goal: Effective Oxygenation and Ventilation  Outcome: Ongoing, Progressing     Problem: Sensorimotor Impairment (Stroke, Ischemic/Transient Ischemic Attack)  Goal: Improved Sensorimotor Function  Outcome: Ongoing, Progressing     Problem: Swallowing Impairment (Stroke, Ischemic/Transient Ischemic Attack)  Goal: Optimal Eating and Swallowing without Aspiration  Outcome: Ongoing, Progressing     Problem: Urinary Elimination Impaired (Stroke, Ischemic/Transient Ischemic Attack)  Goal: Effective Urinary Elimination  Outcome: Ongoing, Progressing     Problem: Fall Injury  Risk  Goal: Absence of Fall and Fall-Related Injury  Outcome: Ongoing, Progressing     Problem: Infection  Goal: Absence of Infection Signs and Symptoms  Outcome: Ongoing, Progressing     Problem: Impaired Wound Healing  Goal: Optimal Wound Healing  Outcome: Ongoing, Progressing     Problem: Skin Injury Risk Increased  Goal: Skin Health and Integrity  Outcome: Ongoing, Progressing

## 2023-08-24 NOTE — ASSESSMENT & PLAN NOTE
Banana bag given in the ED  MVI, thiamine, Folic acid daily  CIWA q4  Ativan 1mg q4 prn CIWA > 11  Withdrawal unlikely, as first time he's drank ETOH in 3 months  D5NS with 20 meq KCl at 100 cc/hr  TSH, RPR, NH3

## 2023-08-24 NOTE — PLAN OF CARE
[facility] Intensive Care  Initial Discharge Assessment       Primary Care Provider: Renetta Matos NP    Admission Diagnosis: Cough [R05.9]  Acute right-sided weakness [R53.1]  Chest pain [R07.9]  Shoulder pain, acute [M25.519]  Fall, initial encounter [W19.XXXA]  Ischemic stroke [I63.9]    Admission Date: 8/23/2023  Expected Discharge Date:   Assessment completed with patient who was alert and oriented. Patient lives with his daughter, Funmilayo Navarro [phone]. He uses a cane as well as oxygen at home however he does not remember what company his oxygen is through. He does not see any specialty doctors, does not attend any outpatient therapies, and does not have any home health services. He does not drive himself and relies on family to drive him to errands and appointments. Patient's PCP is Renetta Matos NP and his pharmacy of choice is [facility]. Patient denies any additional needs at this time. Case management will continue to follow.  Transition of Care Barriers: None    Payor: [employer] MANAGED MEDICARE / Plan: [employer] MEDICARE HMO / Product Type: Capitation /     Extended Emergency Contact Information  Primary Emergency Contact: Funmilayo Navarro  Mobile Phone: [phone]  Relation: Daughter   needed? No    Discharge Plan A: Home with family  Discharge Plan B: Home with family      [facility] [place] - [address]  [address]  [place] [address]  Phone: [phone] Fax: [phone]    [facility] - [facility] [address] [address] [facility]  [address] [address]  Phone: [phone] Fax: [phone]    "[id number] - [place] [address] [address]  [address] [address]  Phone: [phone] Fax: [phone]      Initial Assessment (most recent)       Adult Discharge Assessment - 08/24/23 7399          Discharge Assessment    Assessment Type Discharge Planning Assessment      Confirmed/corrected address, phone number and insurance Yes     Confirmed Demographics Correct on Facesheet     Source of Information patient     Does patient/caregiver understand observation status Yes     Communicated DEMARIO with patient/caregiver Date not available/Unable to determine     Reason For Admission cough     People in Home child(charu), adult   [facility] [phone]    Do you expect to return to your current living situation? Yes     Do you have help at home or someone to help you manage your care at home? Yes     Who are your caregiver(s) and their phone number(s)? [facility] [phone]     Prior to hospitilization cognitive status: Unable to Assess     Current cognitive status: Alert/Oriented     Equipment Currently Used at Home cane, straight;oxygen   does not know what company used for oxygen    Readmission within 30 days? No     Patient currently being followed by outpatient case management? No     Do you currently have service(s) that help you manage your care at home? No     Do you take prescription medications? Yes     Do you have prescription coverage? Yes     Do you have any problems affording any of your prescribed medications? No     Is the patient taking medications as prescribed? yes     Who is going to help you get home at discharge? [facility] [phone]     How do you get to doctors appointments? family or friend will provide     Are you on dialysis? No     Do you take coumadin? No     DME Needed Upon Discharge  none     Discharge Plan discussed with: Patient     Transition of Care Barriers None     Discharge Plan A Home with family     Discharge Plan B Home with family        Physical Activity    On average, how many days per week do you engage in moderate to strenuous exercise (like a brisk walk)? 0 days     On average, how many minutes do you engage in exercise at this level? 0 min        Financial Resource Strain    How hard is it for you to pay for the very basics like food,  housing, medical care, and heating? Not very hard        Housing Stability    In the last 12 months, was there a time when you were not able to pay the mortgage or rent on time? No     In the last 12 months, was there a time when you did not have a steady place to sleep or slept in a shelter (including now)? No        Transportation Needs    In the past 12 months, has lack of transportation kept you from medical appointments or from getting medications? No     In the past 12 months, has lack of transportation kept you from meetings, work, or from getting things needed for daily living? No        Food Insecurity    Within the past 12 months, you worried that your food would run out before you got the money to buy more. Never true     Within the past 12 months, the food you bought just didn't last and you didn't have money to get more. Never true        Stress    Do you feel stress - tense, restless, nervous, or anxious, or unable to sleep at night because your mind is troubled all the time - these days? Only a little        Social Connections    In a typical week, how many times do you talk on the phone with family, friends, or neighbors? Never     How often do you get together with friends or relatives? Never     How often do you attend Alevism or Religion services? More than 4 times per year     Do you belong to any clubs or organizations such as Alevism groups, unions, fraternal or athletic groups, or school groups? No     How often do you attend meetings of the clubs or organizations you belong to? Never        Alcohol Use    Q1: How often do you have a drink containing alcohol? Never     Q2: How many drinks containing alcohol do you have on a typical day when you are drinking? Patient does not drink     Q3: How often do you have six or more drinks on one occasion? Never        OTHER    Name(s) of People in Home Funmilayo Zuni [phone]

## 2023-08-24 NOTE — NURSING
Received to room via stretcher, pt scooted over to bed with verbal instruction.  Iv in L FA infusing banana bag without difficulty.  Wearing depends, uses urinal.  Oriented to room and use of call light.  Bed in low locked position with side rails up x2 and call light in reach.

## 2023-08-24 NOTE — CARE UPDATE
I have added the stroke pathway to his order sets, as they felt this was c/w a true CVA and not guarding from shoulder pain.  I have ordered the below recommendations.    Per neuro stroke tele consult:   89 y/o with HTN, HLD, prostate cancer, brought in after sustaining a fall at home and noted to have R sided weakness.  NIHSS 5, NCCT head without acute abnormality.  Acute L brain stroke. Out of the window for treatment with TNK.  STAT CTA brain and neck searching for LVO.  If no LVO noted, admit to spoke facility and complete stroke work up with MRI brain,TTE, lipid panel.  Load with  mg and Plavix 300 mg.  Neurology, PT/OT and speech therapy consults.     Diagnoses: R sided weakness.  No new Assessment & Plan notes have been filed under this hospital service since the last note was generated.  Service: Vascular Neurology

## 2023-08-24 NOTE — ASSESSMENT & PLAN NOTE
He noted right arm and leg weakness with numbness  The numbness and right leg weakness are improved  The right arm still seems weak to me  I asked the ED to call for acute Tele-stroke consult  MRI and MRA ordered for am  TTE with bubble in am

## 2023-08-24 NOTE — CONSULTS
[facility]  Vascular Neurology  Comprehensive Stroke Center  TeleVascular Neurology Acute Consultation Note      Consults    Consulting Provider: TOSHA SCOTT  Current Providers  No providers found    Patient Location:  [facility] EMERGENCY DEPARTMENT Emergency Department  [facility] hospital nurse at bedside with patient assisting consultant.     Patient information was obtained from patient, past medical records, and primary team.         Assessment/Plan:    89 y/o with HTN, HLD, prostate cancer, brought in after sustaining a fall at home and noted to have R sided weakness.  NIHSS 5, NCCT head without acute abnormality.  Acute L brain stroke. Out of the window for treatment with TNK.  STAT CTA brain and neck searching for LVO.  If no LVO noted, admit to spoke facility and complete stroke work up with MRI brain,TTE, lipid panel.  Load with  mg and Plavix 300 mg.  Neurology, PT/OT and speech therapy consults.    Diagnoses: R sided weakness.  No new Assessment & Plan notes have been filed under this hospital service since the last note was generated.  Service: Vascular Neurology      STROKE DOCUMENTATION     Acute Stroke Times:   Acute Stroke Times   Last Known Normal Date: 08/23/23  Last Known Normal Time: 1500  Symptom Onset Date: 08/23/23  Symptom Onset Time: 1500  Stroke Team Called Date: 08/23/23  Stroke Team Called Time: 2111  Stroke Team Arrival Date: 08/23/23  Stroke Team Arrival Time: 2120  CT Interpretation Time: 2120  Thrombolytic Therapy Recommended: No  Thrombectomy Recommended:  (Pending CTA)    NIH Scale:  Interval: baseline  1a. Level of Consciousness: 0-->Alert, keenly responsive  1b. LOC Questions: 0-->Answers both questions correctly  1c. LOC Commands: 0-->Performs both tasks correctly  2. Best Gaze: 0-->Normal  3. Visual: 0-->No visual loss  4. Facial Palsy: 0-->Normal symmetrical movements  5a. Motor Arm, Left: 2-->Some effort against gravity, limb cannot get  "to or maintain (if cued) 90 (or 45) degrees, drifts down to bed, but has some effort against gravity  5b. Motor Arm, Right: 3-->No effort against gravity, limb falls  6a. Motor Leg, Left: 0-->No drift, leg holds 30 degree position for full 5 secs  6b. Motor Leg, Right: 0-->No drift, leg holds 30 degree position for full 5 secs  7. Limb Ataxia: 0-->Absent  8. Sensory: 0-->Normal, no sensory loss  9. Best Language: 0-->No aphasia, normal  10. Dysarthria: 0-->Normal  11. Extinction and Inattention (formerly Neglect): 0-->No abnormality  Total (NIH Stroke Scale): 5     Modified Martin Score: 0  Kayden Coma Scale:15   ABCD2 Score:    [id number] Score:   HAS -BLED Score:   ICH Score:   Hunt & Benson Classification:       Blood pressure 116/68, pulse 82, temperature 98.6 °F (37 °C), temperature source Oral, resp. rate (!) 22, height 5' 11" (1.803 m), weight 83.9 kg (185 lb), SpO2 99 %.  Eligible for thrombolytic therapy?: No  Thrombolytic therapy recomended: Thrombolytic therapy not recommended due to Outside of treatment window   Possible Interventional Revascularization Candidate? Awaiting CTA results from [facility] for determination     Disposition Recommendation: pending further studies    Subjective:     History of Present Illness: 89 y/o with HTN, HLD, prostate cancer, brought in after sustaining a fall at home and noted to have R sided weakness. Never had similar symptoms before.  No improving.       No notes on file      Woke up with symptoms?: no    Recent bleeding noted: no  Does the patient take any Blood Thinners? no  Medications: Antiplatelets:  no      Past Medical History: hypertension, hyperlipidemia and Cancer    Past Surgical History: no major surgeries within the last 2 weeks    Family History: no relevant history    Social History: no smoking, no drinking, no drugs    Allergies: No Known Allergies     Review of Systems   Constitutional: Negative for chills, diaphoresis and fever.   HENT: Negative for " "hearing loss, tinnitus and trouble swallowing.    Eyes: Negative for visual disturbance.   Respiratory: Negative for shortness of breath.    Cardiovascular: Negative for chest pain and palpitations.   Gastrointestinal: Negative for blood in stool and vomiting.   Endocrine: Negative for cold intolerance.   Genitourinary: Negative for hematuria.   Musculoskeletal: Negative for gait problem.   Skin: Negative for rash.   Allergic/Immunologic: Negative for immunocompromised state.   Neurological: Positive for weakness. Negative for dizziness, facial asymmetry, speech difficulty, numbness and headaches.   Hematological: Does not bruise/bleed easily.   Psychiatric/Behavioral: Negative for agitation, behavioral problems and confusion.     Objective:   Vitals: Blood pressure 116/68, pulse 82, temperature 98.6 °F (37 °C), temperature source Oral, resp. rate (!) 22, height 5' 11" (1.803 m), weight 83.9 kg (185 lb), SpO2 99 %.     CT READ: Yes  No hemmorhage. No mass effect. No early infarct signs.     Physical Exam  Vitals and nursing note reviewed.   Constitutional:       General: He is not in acute distress.     Appearance: Normal appearance. He is not diaphoretic.   HENT:      Head: Normocephalic and atraumatic.      Nose: Nose normal.   Eyes:      Extraocular Movements: Extraocular movements intact.   Cardiovascular:      Rate and Rhythm: Normal rate and regular rhythm.   Pulmonary:      Effort: No respiratory distress.   Abdominal:      General: There is no distension.   Genitourinary:     Comments: na  Musculoskeletal:      Cervical back: Normal range of motion.      Right lower leg: No edema.      Left lower leg: No edema.   Skin:     Findings: No erythema or rash.   Neurological:      Mental Status: He is alert and oriented to person, place, and time.      Cranial Nerves: No cranial nerve deficit.      Sensory: No sensory deficit.      Motor: Weakness present.      Coordination: Coordination normal.   Psychiatric:    "      Mood and Affect: Mood normal.         Behavior: Behavior normal.             Recommended the emergency room physician to have a brief discussion with the patient and/or family if available regarding the  risks and benefits of treatment, and to briefly document the occurrence of that discussion in his clinical encounter note.     The attending portion of this evaluation, treatment, and documentation was performed per Jairon Hightower MD via audiovisual.    Billing code:  (non-intervention mild to moderate stroke, TIA, some mimics)      This patient has a critical neurological condition/illness, with some potential for high morbidity and mortality.  There is a moderate probability for acute neurological change leading to clinical and possibly life-threatening deterioration requiring highest level of physician preparedness for urgent intervention.  Care was coordinated with other physicians involved in the patient's care.  Radiologic studies and laboratory data were reviewed and interpreted, and plan of care was re-assessed based on the results.  Diagnosis, treatment options and prognosis may have been discussed with the patient and/or family members or caregiver.    In your opinion, this was a: Tier 2 Van Negative    Consult End Time: 923 pm    Jairon Hightower MD  Comprehensive Stroke Center  Vascular Neurology   [facility]

## 2023-08-24 NOTE — ASSESSMENT & PLAN NOTE
"He is followed by Urology for this:  "Pt is established with Dr. Brigitte Hyatt with [facility].  1. 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   89-year-old male with a longstanding history of prostate cancer.  He was diagnosed in 2010.  He never received definitive treatment, but received androgen deprivation.  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  His PSA has risen on Lupron and Xtandi.  He is now being switched to Xgeva.  He does complain of left hip pain, which has been worsening over the last couple of months.  This is consistent with his bone scan and CT findings.  I recommended palliative radiation treatment to the left acetabulum.  I had a detailed discussion with the patient regarding the recommendation for treatment, the treatment course, treatment delivery, and the anticipated acute and long-term side effects.  He is in agreement.  We will bring him back for a treatment planning scan and we will try to get his treatment initiated as soon as possible."  "

## 2023-08-24 NOTE — PT/OT/SLP EVAL
Speech Language Pathology Evaluation  Cognitive/Bedside Swallow    Patient Name:  Robert Nava   MRN:  [id number]  Admitting Diagnosis: Syncope and collapse    Recommendations:                  General Recommendations:  Follow-up not indicated  Diet recommendations:  Regular Diet - IDDSI Level 7, Thin liquids - IDDSI Level 0   Aspiration Precautions: Standard aspiration precautions   General Precautions: Standard,    Communication strategies:   Patient is [place] and may require speech to be at a higher loudness level.     Assessment:     Robert Nava is a 89 y.o. male who is hospitalized for syncope, collapse, as well as stroke rule out. Patient reports he fell in his yard. Speech is intelligible but does exhibit minimally reduced speech accuracy possibly due to lack of dentition. Expressive and receptive language are adequate. Patient is oriented to self, place, and year. States he does not know the month because he is unable to read and write. Patient lives at home with daughter and states he is able to do most things by himself at home. No overt s/s of aspiration with any po trials given. Patient appropriate to remain on current regular textured diet and thin liquids. No ST intervention required at this time.       History:     Past Medical History:   Diagnosis Date    BPH (benign prostatic hyperplasia)     Hyperlipidemia     Hypertension     Prostate cancer     He was diagnosed in 2010.  He never received definitive treatment, but received androgen deprivation.  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  His PSA has risen on Lupron and Xtandi.  He is now being switched to Xgeva.    Prostate cancer metastatic to bone     He does complain of left hip pain, which has been worsening over the last couple of months.  This is consistent with his bone scan and CT findings.  I recommended palliative radiation treatment to the left acetabulum.       Past Surgical History:   Procedure Laterality Date    COLONOSCOPY       aprox 2008    COLONOSCOPY N/A 7/27/2020    Procedure: COLONOSCOPY;  Surgeon: Jersey Adam MD;  Location: [facility];  Service: General;  Laterality: N/A;    TONSILLECTOMY         Social History: Patient lives at home with daughter.    Prior Intubation HX:  none    Modified Barium Swallow: none    Chest X-Rays:   EXAMINATION:  XR CHEST AP PORTABLE     CLINICAL HISTORY:  Chest Pain;     TECHNIQUE:  Single frontal view of the chest was performed.     COMPARISON:  04/08/2019     FINDINGS:  The heart is enlarged and unchanged.  Prominence of the hilar vessels is a stable finding.  The aorta is ectatic.  The lungs are currently clear.  There is no pleural effusion.  There are multiple old left rib fractures as well as an old left mid shaft clavicle fracture.     Impression:     Cardiomegaly with no acute pulmonary process.        Electronically signed by: Dahlia Wood  Date:                                            08/23/2023  Time:                                           16:46      Prior diet: regular diet with thin liquids.     Occupation/hobbies/homemaking: Patient was a .    Subjective     Patient sitting in chair, conversant, pleasant.     Patient goals: to go home.      Pain/Comfort:       Respiratory Status: Room air    Objective:     Cognitive Status:    Orientation Person, Place, Situation, and Time  Safety awareness patient stated he would hit call button to call nurse when he wanted to go back to bed. Stated he was not going to get up on his own.       Receptive Language:   Comprehension:      WFL    Pragmatics:        Expressive Language:  Verbal:    Naming Confrontation 100%  Conversational speech 100%  Nonverbal:         Motor Speech:  Intelligibility 80%; speech accuracy may be minimally impaired due to lack of dentition    Voice:   Quality Rough    Visual-Spatial:  WFL    Reading:   Unable to assess patient reports he is not able to read      Written Expression:   Patient  reports he is unable to write    Oral Musculature Evaluation  Oral Musculature: general weakness  Dentition: edentulous  Secretion Management: adequate  Mucosal Quality: good  Oral Labial Strength and Mobility: WFL  Buccal Strength and Mobility: decreased tone  Volitional Swallow: adequate  Voice Prior to PO Intake: clear    Bedside Swallow Eval:   Consistencies Assessed:  Thin liquids water via cup rim: no overt s/s of aspiration  Puree pudding by spoon: no overt s/s of aspiration  Solids tiffanie cracker: no overt s/s of aspiration      Oral Phase:   Oral residue: trace-min amount on tongue with cracker texture. This was resolved with liquid wash.     Pharyngeal Phase:   no overt clinical signs/symptoms of aspiration  no overt clinical signs/symptoms of pharyngeal dysphagia    Compensatory Strategies  None    Treatment: Impressions and recommendations discussed with patient.     Goals:   Multidisciplinary Problems       SLP Goals       Not on file                    Plan:     Patient to be seen:      Plan of Care expires:     Plan of Care reviewed with:  patient   SLP Follow-Up:  No       Discharge recommendations:  Discharge Facility/Level of Care Needs: home with home health, rehabilitation facility   Barriers to Discharge:  None    Time Tracking:     SLP Treatment Date:   08/24/23  Speech Start Time:  0909  Speech Stop Time:  0921     Speech Total Time (min):  12 min    Billable Minutes: Eval 6 min  and Eval Swallow and Oral Function 6 min    08/24/2023

## 2023-08-24 NOTE — ASSESSMENT & PLAN NOTE
No findings on Xray  MRI of shoulder in am, then ortho consult based on results  PT and OT consult

## 2023-08-24 NOTE — NURSING
CTA head and neck completed.  Transferred to CT via bed with assist x3 persons to transfer to table and back to bed.  IV in L AC patent, flushed with NS without difficulty.

## 2023-08-24 NOTE — PT/OT/SLP EVAL
Occupational Therapy   Evaluation    Name: Robert Nava  MRN: [id number]  Admitting Diagnosis: Syncope and collapse  Recent Surgery: * No surgery found *      Recommendations:     Discharge Recommendations:  Skilled nursing facility vs home with home health  Discharge Equipment Recommendations:   Will continue to assess  Barriers to discharge:   None    Assessment:   Mr. Nava is a 89yo man with a past medical history of ETOH abuse, BPH, HLD, prostate cancer, and HTN.  This is his 4th ED visit in 2023.  He lives alone.  He tells me that he used to drink heavily, then stopped for the past 3 months until today.  He states this morning he drank a half pint of whiskey.      Rehab Prognosis: Good     Plan:     Patient to be seen   to address the above listed problems via    Plan of Care Expires:  When patient is discharged.  Plan of Care Reviewed with:  Patient     Subjective       Occupational Profile:  Living Environment: Patient lives with his daughter in a single story home.  Previous level of function: Independent   Equipment Used at Home:  Straight cane  Assistance upon Discharge: Daughter    Pain/Comfort:   No pain      Objective:     Communicated with: OT spoke with patient's nurse prior to entering patient's room for evaluation.    General Precautions: Standard,    Orthopedic Precautions:    Braces:    Respiratory Status: Room air    Occupational Performance:    Bed Mobility:    Patient requires Tran with bed mobility    Functional Mobility/Transfers:  Patient requires Trna with functional transfers and when ambulating 40 feet with hand held assistance.    Activities of Daily Living:  Patient requires modA with ADLs.    Cognitive/Visual Perceptual:  Patient is alert and oriented x4.    Physical Exam:  Patient noted with no BUE deficits      GOALS:   Patient will require CGA with UE dressing.  Patient will require Tran with LE dressing.  Patient will require SBA with grooming.    History:     Past Medical  History:   Diagnosis Date    BPH (benign prostatic hyperplasia)     Hyperlipidemia     Hypertension     Prostate cancer     He was diagnosed in 2010.  He never received definitive treatment, but received androgen deprivation.  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  His PSA has risen on Lupron and Xtandi.  He is now being switched to Xgeva.    Prostate cancer metastatic to bone     He does complain of left hip pain, which has been worsening over the last couple of months.  This is consistent with his bone scan and CT findings.  I recommended palliative radiation treatment to the left acetabulum.         Past Surgical History:   Procedure Laterality Date    COLONOSCOPY      aprox 2008    COLONOSCOPY N/A 7/27/2020    Procedure: COLONOSCOPY;  Surgeon: Jersey Adam MD;  Location: [facility];  Service: General;  Laterality: N/A;    TONSILLECTOMY         Time Tracking:     OT Date of Treatment:  8/24/2023  OT Start Time:  8:50  OT Stop Time:  9:05  OT Total Time (min):  15 minutes       Akil Hughes, OTR/L    8/24/2023

## 2023-08-24 NOTE — PLAN OF CARE
[facility] - Intensive Care  Discharge Final Note    Primary Care Provider: Renetta Matos NP    Expected Discharge Date: 8/24/2023    Verbal follow up appointments with Renetta Matos NP provided to patient. Demonstrated understanding by verbal feedback. Preference form signed for home health with [facility]. His daughter has used them in the past & wants to use them for him. No other needs at this time. She will be here shortly to bring him home.       Final Discharge Note (most recent)       Final Note - 08/24/23 1420          Final Note    Assessment Type Final Discharge Note     Anticipated Discharge Disposition Home-Health Care [facility]     What phone number can be called within the next 1-3 days to see how you are doing after discharge? [phone]     Hospital Resources/Appts/Education Provided Appointments scheduled and added to AVS        Post-Acute Status    Post-Acute Authorization Home Health     Home Health Status Set-up Complete/Auth obtained     Discharge Delays Personal Transportation                     Important Message from Medicare             Contact Info       Renetta Matos NP   Specialty: Family Medicine   Relationship: PCP - General    [address]  [place] [address]   Phone: [phone]       Next Steps: Go on 8/30/2023    Instructions: follow up on Wenesday, August 30 at 9:30 am    [facility]    [address]  [facility] [place] [address]   Phone: [phone]       Next Steps: Follow up    Instructions: will provide home health services

## 2023-08-24 NOTE — NURSING
D/C instructions provided and explained to pt, understanding of D/C instructions verbalized. IV removed, catheter intact. Tolerated well. Telemetry monitor removed and returned to monitor room. VSS. Waiting for transportation from pt's daughter.      No

## 2023-08-24 NOTE — SUBJECTIVE & OBJECTIVE
"  Woke up with symptoms?: no    Recent bleeding noted: no  Does the patient take any Blood Thinners? no  Medications: Antiplatelets:  no      Past Medical History: hypertension, hyperlipidemia and Cancer    Past Surgical History: no major surgeries within the last 2 weeks    Family History: no relevant history    Social History: no smoking, no drinking, no drugs    Allergies: No Known Allergies     Review of Systems   Constitutional: Negative for chills, diaphoresis and fever.   HENT: Negative for hearing loss, tinnitus and trouble swallowing.    Eyes: Negative for visual disturbance.   Respiratory: Negative for shortness of breath.    Cardiovascular: Negative for chest pain and palpitations.   Gastrointestinal: Negative for blood in stool and vomiting.   Endocrine: Negative for cold intolerance.   Genitourinary: Negative for hematuria.   Musculoskeletal: Negative for gait problem.   Skin: Negative for rash.   Allergic/Immunologic: Negative for immunocompromised state.   Neurological: Positive for weakness. Negative for dizziness, facial asymmetry, speech difficulty, numbness and headaches.   Hematological: Does not bruise/bleed easily.   Psychiatric/Behavioral: Negative for agitation, behavioral problems and confusion.     Objective:   Vitals: Blood pressure 116/68, pulse 82, temperature 98.6 °F (37 °C), temperature source Oral, resp. rate (!) 22, height 5' 11" (1.803 m), weight 83.9 kg (185 lb), SpO2 99 %.     CT READ: Yes  No hemmorhage. No mass effect. No early infarct signs.     Physical Exam  Vitals and nursing note reviewed.   Constitutional:       General: He is not in acute distress.     Appearance: Normal appearance. He is not diaphoretic.   HENT:      Head: Normocephalic and atraumatic.      Nose: Nose normal.   Eyes:      Extraocular Movements: Extraocular movements intact.   Cardiovascular:      Rate and Rhythm: Normal rate and regular rhythm.   Pulmonary:      Effort: No respiratory distress. "   Abdominal:      General: There is no distension.   Genitourinary:     Comments: na  Musculoskeletal:      Cervical back: Normal range of motion.      Right lower leg: No edema.      Left lower leg: No edema.   Skin:     Findings: No erythema or rash.   Neurological:      Mental Status: He is alert and oriented to person, place, and time.      Cranial Nerves: No cranial nerve deficit.      Sensory: No sensory deficit.      Motor: Weakness present.      Coordination: Coordination normal.   Psychiatric:         Mood and Affect: Mood normal.         Behavior: Behavior normal.

## 2023-08-24 NOTE — HPI
"Mr. Nava is a 89yo man with a past medical history of ETOH abuse, BPH, HLD, prostate cancer, and HTN.  This is his 4th ED visit in 2023.  He lives alone.  He tells me that he used to drink heavily, then stopped for the past 3 months until today.  He states this morning he drank a half pint of whiskey.      He was out in the yard earlier and passed out.  He state she remembers getting dizzy, then waking up on the ground.  He tells me he could not get up because his right side was numb and weak, especially his arm.  He also states he had some severe right shoulder pain, complicating the right arm weakness issues (reluctant to move it).     He lay on the ground about 1 hour until his daughter came over and found him down in the yard.  He denies any N/V/D, SOB or CP.  He states he has had no visual changes, double vision, or vertigo.  His right leg weakness is better, but he still feels his arm is weak.    In the ED his VS were /57 -> 128/71 (BP Location: Right arm, Patient Position: Lying)   Pulse 89   Temp 98.6 °F (37 °C) (Oral)   Resp 20   Ht 5' 11" (1.803 m)   Wt 83.9 kg (185 lb)   SpO2 95% RA -> 99% 2L NC  BMI 25.80 kg/m².  Labs showed Hg 8.5, Cl 114, AG 9, HCO3 19, Cr 1.1, ETOH 180.  UA/UDS NEG.    CXR showed cardiomegaly with no acute pulmonary process.  Xray right shoulder showed well corticated osseous fragments are noted at the acromial clavicular articulation superiorly with the largest well corticated fragment of 9 mm suggestive of prior chip injury.  No obvious acute fracture or dislocation is noted.  Osseous demineralization is noted.    CT head without showed atrophy and chronic microvascular ischemia.  Mild sinus disease.  CT C-spine without showed extensive degenerative changes with no acute traumatic abnormality.  EKG showed sinus with 1st degree AVB, rate 80, LVH, Q V1/2, QTc 461 ms.    In the ED he was treated with:  Medications   sodium chloride 0.9% 1,000 mL with mvi, (ADULT) no.4 " with vit K 3,300 unit- 150 mcg/10 mL 10 mL, thiamine 100 mg, folic acid 1 mg infusion ( Intravenous New Bag 8/23/23 1931)

## 2023-08-24 NOTE — ASSESSMENT & PLAN NOTE
TTE in am given possible CVA  Monitor on Telemetry  Most likely culprit would be heat prostration while outside intoxicated  Neuro checks q4 hours  No acute EKG changes or arrhythmias

## 2023-08-24 NOTE — CLINICAL REVIEW
CTA head and neck with no large vessel occlusion or stenosis    [facility]  Preliminary Radiology Report  Call: [phone]  assistance Online chat: [email or web address]  Patient Name: DEEP JONES MRN: [id number]   (Age): 1933 89 Gender: M  Date of Exam: 2023 Accession: [id number]  Referring Physician: HARJIT SCOTT # of Images: [id number]  Ordered As: CTA HEAD/NECK WWO    PROCEDURE INFORMATION:  Exam: CTA Head With Contrast, Arteriography  Exam date and time: 2023 12:04 AM  Age: 89 years old  Clinical indication: Stroke-like symptoms; Syncope/collapse; RT upper extremity and RT lower  extremity weakness  TECHNIQUE:  Imaging protocol: Computed tomographic angiography of the head with contrast. Exam focused on  the arteries.  3D rendering (Not supervised by radiologist): MIP and/or 3D reconstructed images were created  by the technologist.  Contrast material: [id number]; Contrast volume: 75 ml; Contrast route: INTRAVENOUS (IV);  COMPARISON:    CT HEAD WITHOUT CONTRAST 2023 4:27 PM  FINDINGS:    ANTERIOR CIRCULATION:  Right internal carotid artery: Intracranial segment is patent with no significant stenosis. No  aneurysm.  Right middle cerebral artery: No occlusion or significant stenosis. No aneurysm.  Right anterior cerebral artery: No occlusion or significant stenosis. No aneurysm.  Left internal carotid artery: Intracranial segment is patent with no significant stenosis. No  aneurysm.  Left middle cerebral artery: No occlusion or significant stenosis. No aneurysm.  Left anterior cerebral artery: No occlusion or significant stenosis. No aneurysm.    POSTERIOR CIRCULATION:  Right vertebral artery: No occlusion or significant stenosis. No aneurysm.  Left vertebral artery: No occlusion or significant stenosis. No aneurysm.  Basilar artery: No occlusion or significant stenosis. No aneurysm.  Right posterior cerebral artery: No occlusion or significant stenosis.  No aneurysm.  Left posterior cerebral artery: No occlusion or significant stenosis. No aneurysm.  Brain: No definite mass, mass effect, or midline shift.  Cerebral ventricles: No ventriculomegaly.  DEEP JONES Accession: [id number] MRN: [id number]  Preliminary Radiology Report  Page 2 of 3  Bones/joints: Unremarkable. No acute fracture.  Soft tissues: Unremarkable.    IMPRESSION:  No large vessel stenosis or occlusion.  =========================  PROCEDURE INFORMATION:  Exam: CTA Neck With Contrast  Exam date and time: 8/24/2023 12:04 AM  Age: 89 years old  Clinical indication: Stroke-like symptoms; Syncope/collapse; RT upper extremity and RT lower  extremity weakness  TECHNIQUE:  Imaging protocol: Computed tomographic angiography of the neck with contrast.  3D rendering (Not supervised by radiologist): MIP and/or 3D reconstructed images were created  by the technologist.  Contrast material: [id number]; Contrast volume: 75 ml; Contrast route: INTRAVENOUS (IV);  COMPARISON:  CT CERVICAL SPINE WITHOUT CONTRAST 8/23/2023 4:27 PM  FINDINGS:  Right common carotid artery: No stenosis. No dissection or occlusion.  Right internal carotid artery: Mild right ICA stenosis.  Right external carotid artery: No occlusion or stenosis of the origin.  Left common carotid artery: No stenosis. No dissection or occlusion.  Left internal carotid artery: No stenosis of the extracranial segment. No dissection or occlusion.  Left external carotid artery: No occlusion or stenosis of the origin.  Right vertebral artery: No stenosis. No dissection or occlusion.  Left vertebral artery: No stenosis. No dissection or occlusion.  Soft tissues: Normal. No significant soft tissue swelling.  Bones/joints: No acute fracture is detected. Note this is not a dedicated cervical spine CT on the  sensitivity for detecting fracture may be lower. There is multilevel degenerative disc disease and facet  osteoarthritis.    IMPRESSION:  No stenosis or  occlusion.    REFERENCES:  ROBERT JONES Accession: [id number] MRN: [id number]  Preliminary Radiology Report   (QA) DISCREPANCY?  If there is a discrepancy between the preliminary and final interpretation, please notify [facility] via [email or web address].  If you do not have access to our QA portal, call our QA team at [phone]  CONFIDENTIALITY STATEMENT  This report is intended only for the use of the referring physician, and only in accordance with law, If you received this in error, call [phone]  Page 3 of 3  NASCET CRITERIA. The degree of stenosis in the cervical segment of the internal carotid artery is  based on NASCET criteria. Normal is no stenosis. Mild is less than 50% stenosis. Moderate is 50-  69% stenosis. Severe is 70% to 99% stenosis. Total occlusion is no detectable patent lumen.  Thank you for allowing us to participate in the care of your patient.  Dictated and Authenticated by: Robert Durham MD  08/24/2023 12:49 AM Central Time ([place] & [place])

## 2023-08-24 NOTE — ASSESSMENT & PLAN NOTE
Ordered B12, folate, Fe studies     Latest Reference Range & Units 07/03/23 00:10 08/05/23 13:52 08/23/23 16:17   Hemoglobin 14.0 - 18.0 g/dL 11.1 (L) 10.2 (L) 8.5 (L)

## 2023-08-24 NOTE — PT/OT/SLP EVAL
Physical Therapy Evaluation     Patient Name: Robert Nava   MRN: [id number]  Recent Surgery: * No surgery found *      Recommendations:     Discharge Recommendations:  (home with home health vs skilled placement)   Discharge Equipment Recommendations:     Barriers to discharge: None    Assessment:   HPI:   Mr. Nava is a 89yo man with a past medical history of ETOH abuse, BPH, HLD, prostate cancer, and HTN.  This is his 4th ED visit in 2023.  He lives alone.  He tells me that he used to drink heavily, then stopped for the past 3 months until today.  He states this morning he drank a half pint of whiskey.       He was out in the yard earlier and passed out.  He state she remembers getting dizzy, then waking up on the ground.  He tells me he could not get up because his right side was numb and weak, especially his arm.  He also states he had some severe right shoulder pain, complicating the right arm weakness issues (reluctant to move it).      He lay on the ground about 1 hour until his daughter came over and found him down in the yard.  He denies any N/V/D, SOB or CP.  He states he has had no visual changes, double vision, or vertigo.  His right leg weakness is better, but he still feels his arm is weak.    Robert Nava is a 89 y.o. male admitted with a medical diagnosis of Syncope and collapse. He presents with the following impairments/functional limitations: weakness, impaired endurance, impaired functional mobility, gait instability, impaired balance.     Rehab Prognosis: Good; patient would benefit from acute PT services to address these deficits and reach maximum level of function.    Plan:     During this hospitalization, patient to be seen  (3-5 x/wk) to address the above listed problems via gait training, therapeutic activities, therapeutic exercises    Plan of Care Expires:  (upon discharge from facility)    Subjective     Chief Complaint: syncope and collapse  Patient Comments/Goals: patient  stating he wants to discharge home  Pain/Comfort:  Pain Rating 1:  (7/10 right shoulder)    Social History:  Living Environment: Patient lives with their daughter in a single story home   Prior Level of Function: Prior to admission, patient was independent, used QC to ambulate  Equipment Used at Home: cane, quad, oxygen  DME owned (not currently used): none  Assistance Upon Discharge: family    Objective:     Communicated with RN prior to session. Patient found HOB elevated with peripheral IV upon PT entry to room.    General Precautions: Standard, fall   Orthopedic Precautions: N/A   Braces: N/A    Respiratory Status: Room air    Exams:  Cognition: Patient is oriented to Person, Place, Time, Situation  RLE ROM: WFL  RLE Strength:  4-/5 to 4/5  LLE ROM: WFL  LLE Strength:  4/5    Functional Mobility:  Gait belt applied - Yes  Bed Mobility  Supine to Sit: stand by assistance for trunk management  Sit to Supine: activity not assessed as patient left sitting in wheelchair for transport to radiology  Transfers  Sit to Stand: contact guard assistance with quad cane  Gait  Patient ambulated 50' from bed to wheelchair in hallway with quad cane and contact guard assistance. Patient demonstrates occasional unsteady gait, decreased foot clearance, and flexed posture.  All lines remained intact throughout ambulation trail.  Balance  Sitting: independence  Standing: SBA to CGA    Therapeutic Activities and Exercises:   Patient educated on role of acute care PT and PT POC, safety while in hospital including calling nurse for mobility, and call light usage  Patient educated about importance of OOB mobility and remaining up in chair most of the day.    AM-PAC 6 CLICK MOBILITY  Total Score:     Patient left  up in wheelchair  with RN notified and rad tech present to transport him to radiology .    GOALS:   Pt will perform sup<>sit transfers w/ independence  Pt will have sufficient dynamic balance to sit EOB while performing  ADLs/therex w/ independence  PT will be able to stand up from EOB w/ independence using LRAD  Pt will ambulate 100 feet w/ modified independence using LRAD  Pt will be independent w/ HEP therex on BLE w/ good form and ROM   Pt will require no cueing for dynamic balance or safety awareness when performing transfers and ambulation w/ PT      History:     Past Medical History:   Diagnosis Date    BPH (benign prostatic hyperplasia)     Hyperlipidemia     Hypertension     Prostate cancer     He was diagnosed in 2010.  He never received definitive treatment, but received androgen deprivation.  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  His PSA has risen on Lupron and Xtandi.  He is now being switched to Xgeva.    Prostate cancer metastatic to bone     He does complain of left hip pain, which has been worsening over the last couple of months.  This is consistent with his bone scan and CT findings.  I recommended palliative radiation treatment to the left acetabulum.       Past Surgical History:   Procedure Laterality Date    COLONOSCOPY      aprox 2008    COLONOSCOPY N/A 7/27/2020    Procedure: COLONOSCOPY;  Surgeon: Jersey Adam MD;  Location: [facility];  Service: General;  Laterality: N/A;    TONSILLECTOMY         Time Tracking:     PT Received On: 08/24/23  PT Start Time: 1008  PT Stop Time: 1028  PT Total Time (min): 20 min     Billable Minutes: Evaluation 20 min    8/24/2023

## 2023-08-25 LAB — RPR SER QL: NORMAL

## 2023-08-30 ENCOUNTER — TELEPHONE (OUTPATIENT)
Dept: FAMILY MEDICINE | Facility: CLINIC | Age: 88
End: 2023-08-30

## 2023-08-30 NOTE — HOSPITAL COURSE
Patient admitted for syncope and collapse in his front yard. Patient was acutely intoxicated with EtOH. Patient state that he drinks until he is drunk once every 3 months. Admitted since he was not back to his cognitive baseline. Had right arm weakness on admitting hospitalist exam. Patient unable to tell them that this is baseline weakness at time of admit. Stroke workup initiated and negative. MRI w/o acute infarct. Morning after admit patient told me that he has had weakness in right shoulder and arm for some time and this was not new. PT/OT evaluated patient. Patient wanting to go home with home health. Gave counseling on EtOH cessation. Patient lives with daughter who expressed some concern with patient's mood when he drinks. She stated that when he becomes intoxicated he does not want to live and sometimes appear to express passive SI. I discussed this with patient. He denies any SI and states that even though he says this he would never hurt himself or anyone else. He has no access to weapons at his house. Patient would benefit from outpatient psychiatry. This was communicated with patient's daughter who agrees. Patient was provided discharge instructions and return precautions.

## 2023-08-30 NOTE — DISCHARGE SUMMARY
"LT[facility], located within [facility]  Discharge Summary      Patient Name: Robert Nava  MRN: [id number]  ALISE: [id number]  Patient Class: OP- Observation  Admission Date: 8/23/2023  Hospital Length of Stay: 0 days  Discharge Date and Time: 8/24/2023  3:25 PM  Attending Physician: No att. providers found   Discharging Provider: Dave Nieves MD  Primary Care Provider: Renetta Matos NP    Primary Care Team: Networked reference to record PCT     HPI:   Mr. Nava is a 89yo man with a past medical history of ETOH abuse, BPH, HLD, prostate cancer, and HTN.  This is his 4th ED visit in 2023.  He lives alone.  He tells me that he used to drink heavily, then stopped for the past 3 months until today.  He states this morning he drank a half pint of whiskey.      He was out in the yard earlier and passed out.  He state she remembers getting dizzy, then waking up on the ground.  He tells me he could not get up because his right side was numb and weak, especially his arm.  He also states he had some severe right shoulder pain, complicating the right arm weakness issues (reluctant to move it).     He lay on the ground about 1 hour until his daughter came over and found him down in the yard.  He denies any N/V/D, SOB or CP.  He states he has had no visual changes, double vision, or vertigo.  His right leg weakness is better, but he still feels his arm is weak.    In the ED his VS were /57 -> 128/71 (BP Location: Right arm, Patient Position: Lying)   Pulse 89   Temp 98.6 °F (37 °C) (Oral)   Resp 20   Ht 5' 11" (1.803 m)   Wt 83.9 kg (185 lb)   SpO2 95% RA -> 99% 2L NC  BMI 25.80 kg/m².  Labs showed Hg 8.5, Cl 114, AG 9, HCO3 19, Cr 1.1, ETOH 180.  UA/UDS NEG.    CXR showed cardiomegaly with no acute pulmonary process.  Xray right shoulder showed well corticated osseous fragments are noted at the acromial clavicular articulation superiorly with the largest well corticated fragment of 9 mm " suggestive of prior chip injury.  No obvious acute fracture or dislocation is noted.  Osseous demineralization is noted.    CT head without showed atrophy and chronic microvascular ischemia.  Mild sinus disease.  CT C-spine without showed extensive degenerative changes with no acute traumatic abnormality.  EKG showed sinus with 1st degree AVB, rate 80, LVH, Q V1/2, QTc 461 ms.    In the ED he was treated with:  Medications   sodium chloride 0.9% 1,000 mL with mvi, (ADULT) no.4 with vit K 3,300 unit- 150 mcg/10 mL 10 mL, thiamine 100 mg, folic acid 1 mg infusion ( Intravenous New Bag 8/23/23 1931)           * No surgery found *      Hospital Course:   Patient admitted for syncope and collapse in his front yard. Patient was acutely intoxicated with EtOH. Patient state that he drinks until he is drunk once every 3 months. Admitted since he was not back to his cognitive baseline. Had right arm weakness on admitting hospitalist exam. Patient unable to tell them that this is baseline weakness at time of admit. Stroke workup initiated and negative. MRI w/o acute infarct. Morning after admit patient told me that he has had weakness in right shoulder and arm for some time and this was not new. PT/OT evaluated patient. Patient wanting to go home with home health. Gave counseling on EtOH cessation. Patient lives with daughter who expressed some concern with patient's mood when he drinks. She stated that when he becomes intoxicated he does not want to live and sometimes appear to express passive SI. I discussed this with patient. He denies any SI and states that even though he says this he would never hurt himself or anyone else. He has no access to weapons at his house. Patient would benefit from outpatient psychiatry. This was communicated with patient's daughter who agrees. Patient was provided discharge instructions and return precautions.       Goals of Care Treatment Preferences:  Code Status: Full Code      Consults:    Consults (From admission, onward)        Status Ordering Provider     IP consult to case management/social work  Once        Provider:  (Not yet assigned)    Completed TOSHA SCOTT     Case Management/  Once        Provider:  (Not yet assigned)    Completed TOSHA SCOTT          No new Assessment & Plan notes have been filed under this hospital service since the last note was generated.  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 Go on 8/30/2023.    Specialty: Family Medicine  Why: follow up on Wenesday, August 30 at 9:30 am  Contact information:  [address] [place] [address] [address]  [place] [address]  [phone]             [facility] Follow up.    Why: will provide home health services  Contact information:  [address] [place] [id number]  [phone]                     Patient Instructions:      Ambulatory referral/consult to Home Health   Standing Status: Future   Referral Priority: Routine Referral Type: Home Health   Referral Reason: Specialty Services Required   Requested Specialty: Home Health Services   Number of Visits Requested: 1     Diet Adult Regular     Notify  your health care provider if you experience any of the following:  increased confusion or weakness     Activity as tolerated       Significant Diagnostic Studies:   Recent Results (from the past 336 hour(s))   POCT glucose    Collection Time: 08/23/23  4:06 PM   Result Value Ref Range    POCT Glucose 104 70 - 110 mg/dL   CBC auto differential    Collection Time: 08/23/23  4:17 PM   Result Value Ref Range    WBC 5.57 3.90 - 12.70 K/uL    RBC 2.88 (L) 4.60 - 6.20 M/uL    Hemoglobin 8.5 (L) 14.0 - 18.0 g/dL    Hematocrit 25.7 (L) 40.0 - 54.0 %    MCV 89 82 - 98 fL    MCH 29.5 27.0 - 31.0 pg    MCHC 33.1 32.0 - 36.0 g/dL    RDW 16.5 (H) 11.5 - 14.5 %    Platelets 177 150 - 450 K/uL    MPV 8.4 (L) 9.2 - 12.9 fL    Immature Granulocytes 2.3 (H) 0.0 - 0.5 %    Gran # (ANC) 4.0 1.8 - 7.7 K/uL    Immature Grans (Abs) 0.13 (H) 0.00 - 0.04 K/uL    Lymph # 0.9 (L) 1.0 - 4.8 K/uL    Mono # 0.5 0.3 - 1.0 K/uL    Eos # 0.0 0.0 - 0.5 K/uL    Baso # 0.01 0.00 - 0.20 K/uL    nRBC 0 0 /100 WBC    Gran % 72.5 38.0 - 73.0 %    Lymph % 15.8 (L) 18.0 - 48.0 %    Mono % 9.0 4.0 - 15.0 %    Eosinophil % 0.2 0.0 - 8.0 %    Basophil % 0.2 0.0 - 1.9 %    Differential Method Automated    Comprehensive metabolic panel    Collection Time: 08/23/23  4:17 PM   Result Value Ref Range    Sodium 142 136 - 145 mmol/L    Potassium 3.7 3.5 - 5.1 mmol/L    Chloride 114 (H) 95 - 110 mmol/L    CO2 19 (L) 23 - 29 mmol/L    Glucose 90 70 - 110 mg/dL    BUN 13 8 - 23 mg/dL    Creatinine 1.1 0.5 - 1.4 mg/dL    Calcium 8.1 (L) 8.7 - 10.5 mg/dL    Total Protein 5.5 (L) 6.0 - 8.4 g/dL    Albumin 3.1 (L) 3.5 - 5.2 g/dL    Total Bilirubin 0.3 0.1 - 1.0 mg/dL    Alkaline Phosphatase 39 (L) 55 - 135 U/L    AST 11 10 - 40 U/L    ALT 14 10 - 44 U/L    eGFR >60.0 >60 mL/min/1.73 m^2    Anion Gap 9 8 - 16 mmol/L   Acetaminophen level    Collection Time: 08/23/23  4:17 PM   Result Value Ref Range    Acetaminophen (Tylenol), Serum <3.0 (L) 10.0 - 20.0 ug/mL   Ethanol     Collection Time: 08/23/23  4:17 PM   Result Value Ref Range    Alcohol, Serum 180 (H) 0 - 10 mg/dL   APTT    Collection Time: 08/23/23  4:17 PM   Result Value Ref Range    aPTT 26.1 21.0 - 32.0 sec   Lipase    Collection Time: 08/23/23  4:17 PM   Result Value Ref Range    Lipase 37 4 - 60 U/L   Magnesium    Collection Time: 08/23/23  4:17 PM   Result Value Ref Range    Magnesium 2.2 1.6 - 2.6 mg/dL   Troponin I    Collection Time: 08/23/23  4:17 PM   Result Value Ref Range    Troponin I <0.006 0.000 - 0.026 ng/mL   CK    Collection Time: 08/23/23  4:17 PM   Result Value Ref Range    CPK 16 (L) 20 - 200 U/L   Urinalysis - Cath.    Collection Time: 08/23/23  5:05 PM   Result Value Ref Range    Specimen UA Urine, Unspecified     Color, UA Yellow Yellow, Straw, Alejandrina    Appearance, UA Clear Clear    pH, UA 6.0 5.0 - 8.0    Specific Gravity, UA <=1.005 1.005 - 1.030    Protein, UA Negative Negative    Glucose, UA Negative Negative    Ketones, UA Negative Negative    Bilirubin (UA) Negative Negative    Occult Blood UA Negative Negative    Nitrite, UA Negative Negative    Urobilinogen, UA Negative Negative EU/dL    Leukocytes, UA Negative Negative   Drug screen panel, emergency    Collection Time: 08/23/23  5:05 PM   Result Value Ref Range    Benzodiazepines Negative Negative    Methadone metabolites Negative Negative    Cocaine (Metab.) Negative Negative    Opiate Scrn, Ur Negative Negative    Barbiturate Screen, Ur Negative Negative    Amphetamine Screen, Ur Negative Negative    THC Negative Negative    Phencyclidine Negative Negative    Creatinine, Urine 53.4 23.0 - 375.0 mg/dL    Toxicology Information SEE COMMENT    Vitamin B12    Collection Time: 08/23/23  9:17 PM   Result Value Ref Range    Vitamin B-12 516 210 - 950 pg/mL   Folate    Collection Time: 08/23/23  9:17 PM   Result Value Ref Range    Folate >40.0 (H) 4.0 - 24.0 ng/mL   Iron and TIBC    Collection Time: 08/23/23  9:17 PM   Result Value Ref Range    Iron  60 45 - 160 ug/dL    Transferrin 199 (L) 200 - 375 mg/dL    TIBC 295 250 - 450 ug/dL    Saturated Iron 20 20 - 50 %   Ferritin    Collection Time: 08/23/23  9:17 PM   Result Value Ref Range    Ferritin 185 20.0 - 300.0 ng/mL   TSH    Collection Time: 08/23/23  9:17 PM   Result Value Ref Range    TSH 0.868 0.400 - 4.000 uIU/mL   Ammonia    Collection Time: 08/23/23  9:17 PM   Result Value Ref Range    Ammonia 21 10 - 50 umol/L   RPR    Collection Time: 08/23/23  9:17 PM   Result Value Ref Range    RPR Non-reactive Non-reactive   Comprehensive Metabolic Panel (CMP)    Collection Time: 08/24/23  4:22 AM   Result Value Ref Range    Sodium 141 136 - 145 mmol/L    Potassium 3.9 3.5 - 5.1 mmol/L    Chloride 114 (H) 95 - 110 mmol/L    CO2 19 (L) 23 - 29 mmol/L    Glucose 99 70 - 110 mg/dL    BUN 13 8 - 23 mg/dL    Creatinine 1.1 0.5 - 1.4 mg/dL    Calcium 8.1 (L) 8.7 - 10.5 mg/dL    Total Protein 5.8 (L) 6.0 - 8.4 g/dL    Albumin 3.2 (L) 3.5 - 5.2 g/dL    Total Bilirubin 0.4 0.1 - 1.0 mg/dL    Alkaline Phosphatase 46 (L) 55 - 135 U/L    AST 19 10 - 40 U/L    ALT 17 10 - 44 U/L    eGFR >60.0 >60 mL/min/1.73 m^2    Anion Gap 8 8 - 16 mmol/L   Magnesium    Collection Time: 08/24/23  4:22 AM   Result Value Ref Range    Magnesium 2.2 1.6 - 2.6 mg/dL   Phosphorus    Collection Time: 08/24/23  4:22 AM   Result Value Ref Range    Phosphorus 2.5 (L) 2.7 - 4.5 mg/dL   CBC with Automated Differential    Collection Time: 08/24/23  4:22 AM   Result Value Ref Range    WBC 6.39 3.90 - 12.70 K/uL    RBC 3.22 (L) 4.60 - 6.20 M/uL    Hemoglobin 9.3 (L) 14.0 - 18.0 g/dL    Hematocrit 28.6 (L) 40.0 - 54.0 %    MCV 89 82 - 98 fL    MCH 28.9 27.0 - 31.0 pg    MCHC 32.5 32.0 - 36.0 g/dL    RDW 17.1 (H) 11.5 - 14.5 %    Platelets 224 150 - 450 K/uL    MPV 9.1 (L) 9.2 - 12.9 fL    Immature Granulocytes 2.5 (H) 0.0 - 0.5 %    Gran # (ANC) 4.0 1.8 - 7.7 K/uL    Immature Grans (Abs) 0.16 (H) 0.00 - 0.04 K/uL    Lymph # 1.2 1.0 - 4.8 K/uL    Mono # 1.0  0.3 - 1.0 K/uL    Eos # 0.1 0.0 - 0.5 K/uL    Baso # 0.04 0.00 - 0.20 K/uL    nRBC 0 0 /100 WBC    Gran % 61.8 38.0 - 73.0 %    Lymph % 18.9 18.0 - 48.0 %    Mono % 14.9 4.0 - 15.0 %    Eosinophil % 1.3 0.0 - 8.0 %    Basophil % 0.6 0.0 - 1.9 %    Differential Method Automated    Lipid panel    Collection Time: 08/24/23  4:22 AM   Result Value Ref Range    Cholesterol 158 120 - 199 mg/dL    Triglycerides 98 30 - 150 mg/dL    HDL 50 40 - 75 mg/dL    LDL Cholesterol 88.4 63.0 - 159.0 mg/dL    HDL/Cholesterol Ratio 31.6 20.0 - 50.0 %    Total Cholesterol/HDL Ratio 3.2 2.0 - 5.0    Non-HDL Cholesterol 108 mg/dL   Troponin I    Collection Time: 08/24/23  4:22 AM   Result Value Ref Range    Troponin I 0.009 0.000 - 0.026 ng/mL   Protime-INR    Collection Time: 08/24/23  4:22 AM   Result Value Ref Range    Prothrombin Time 10.5 9.0 - 12.5 sec    INR 1.0 0.8 - 1.2   APTT    Collection Time: 08/24/23  4:22 AM   Result Value Ref Range    aPTT 25.9 21.0 - 32.0 sec     Imaging Results          CTA Head and Neck (xpd) (Final result)  Result time 08/24/23 09:30:53    Final result by Neftaly Rodriguez MD (08/24/23 09:30:53)                 Impression:      1. No proximal intracranial large vessel occlusion or hemodynamically significant arterial stenosis.  2. Altered flow dynamics at the level the left transverse and sigmoid sinus likely on a developmental basis given diminutive appearance and small collateral venous vessels in the region.  Dural sinus thrombosis would be difficult to entirely exclude but unlikely.  If there is high clinical suspicion, follow-up CTV or contrast-enhanced MRV could be considered.  3. Mild atherosclerotic plaque at the level the proximal right cervical ICA.  No hemodynamically significant stenosis.  Otherwise essentially negative CTA of the neck.  Primary findings of the preliminary report are concordant.      Electronically signed by: Neftaly Rodriguez  Date:    08/24/2023  Time:    09:30             Narrative:     EXAMINATION:  CTA HEAD AND NECK (XPD)    CLINICAL HISTORY:  Neuro deficit, acute, stroke suspected;    TECHNIQUE:  Non contrast low dose axial images were obtained through the head. CT angiogram was performed from the level of the alysha to the top of the head following the IV administration of 75mL of Omnipaque 350.   Sagittal and coronal reconstructions and maximum intensity projection reconstructions were performed. Arterial stenosis percentages are based on NASCET measurement criteria.    COMPARISON:  Head CT 08/23/2023.  CT cervical spine 08/23/2023.    FINDINGS:  CTA HEAD:    Vasculature: Minimal atherosclerotic changes of the cavernous carotid arteries and distal vertebral arteries.  The intracranial arteries otherwise show normal anatomy without evidence of major branch occlusion or focal luminal narrowing.There is no suspicion of vascular malformation or saccular aneurysm.    Limited CTA imaging of the brain demonstrate no intracranial mass effect or evidence of a new major vascular territory infarct.  No space-occupying extra-axial fluid collections or evidence of hydrocephalus.    No significant opacification of the mastoid air cells and paranasal sinuses.    CTA NECK:    Aorta: Aortic arch is not included in the field of view.  Visualized proximal great vessels demonstrate no hemodynamically significant stenosis.  Mild scattered atherosclerotic changes.    Vertebral Arteries: The origins of the vertebral arteries are patent.  No flow limiting stenosis, occlusion, or dissection.    Right Carotid: No flow limiting stenosis, occlusion, or dissection of the common carotid or internal carotid arteries.  Mild plaque of the proximal right ICA.  Right internal carotid artery: Approximately 25 % stenosis by and NASCET.    Left Carotid: No flow limiting stenosis, occlusion, or dissection of the common carotid or internal carotid arteries. No significant plaque of the proximal ICA. Right internal carotid artery:  0 % stenosis by and NASCET.    Extravascular Anatomy: No focal soft tissue abnormality.  Redemonstrated degenerative changes of the cervical spine.                               X-Ray Shoulder Trauma Right (Final result)  Result time 08/23/23 17:58:29    Final result by Constantine Dixon IV, MD (08/23/23 17:58:29)                 Impression:      See above      Electronically signed by: Constantine Dixon MD  Date:    08/23/2023  Time:    17:58             Narrative:    EXAMINATION:  XR SHOULDER TRAUMA 3 VIEW RIGHT    CLINICAL HISTORY:  Pain in unspecified shoulder    TECHNIQUE:  Three or four views of the right shoulder were performed.    COMPARISON:  None    FINDINGS:  Well corticated osseous fragments are noted at the acromial clavicular articulation superiorly with the largest well corticated fragment of 9 mm suggestive of prior chip injury.  No obvious acute fracture or dislocation is noted.  Osseous demineralization is noted.  The cardiac silhouette appears partially visualized and enlarged                               CT CERVICAL SPINE WITHOUT CONTRAST (Final result)  Result time 08/23/23 16:58:00    Final result by Dahlia Wood MD (08/23/23 16:58:00)                 Impression:      Extensive degenerative changes with no acute traumatic abnormality.      Electronically signed by: Dahlia Wood  Date:    08/23/2023  Time:    16:58             Narrative:    EXAMINATION:  CT CERVICAL SPINE WITHOUT CONTRAST    CLINICAL HISTORY:  Neck pain, acute, no red flags;fall    TECHNIQUE:  Low dose axial images, sagittal and coronal reformations were performed though the cervical spine.  Contrast was not administered.    COMPARISON:  None    FINDINGS:  There is no hematoma formation in the soft tissues in the neck.  There is calcific plaque in the carotid arteries.  There is an old, healed fracture of the left clavicle.    The skull base is intact.    There is no fracture in the cervical spine.    There is  anterolisthesis of C3 on C4 and C4 on C5.  There are extensive degenerative changes at the disc spaces and facet joints throughout the cervical spine.  This particularly involves the C5/6 and C6/7 disc spaces, where there is a moderate degree of spinal stenosis present.  There is multi level neural foraminal narrowing.                               CT Head Without Contrast (Final result)  Result time 08/23/23 16:48:44    Final result by Dahlia Wood MD (08/23/23 16:48:44)                 Impression:      Atrophy and chronic microvascular ischemia.  Mild sinus disease.      Electronically signed by: Dahlia Wood  Date:    08/23/2023  Time:    16:48             Narrative:    EXAMINATION:  CT HEAD WITHOUT CONTRAST    CLINICAL HISTORY:  Neuro deficit, acute, stroke suspected;Head trauma, abnormal mental status (Age 19-64y);    TECHNIQUE:  Low dose axial images were obtained through the head.  Coronal and sagittal reformations were also performed. Contrast was not administered.    COMPARISON:  None    FINDINGS:  There is no intra or extra-axial hemorrhage.  No mass effect, edema or midline shift is present.  There is generalized cerebral and cerebellar atrophy with moderately severe periventricular low densities of chronic microvascular ischemia.  There is intracranial atherosclerosis.  Incidental basal ganglia calcification.    Mucosal thickening throughout the ethmoid air cells and frontal sinus.  Bony calvarium intact.                               X-Ray Chest AP Portable (Final result)  Result time 08/23/23 16:46:55    Final result by Dahlia Wood MD (08/23/23 16:46:55)                 Impression:      Cardiomegaly with no acute pulmonary process.      Electronically signed by: Dahlia Wood  Date:    08/23/2023  Time:    16:46             Narrative:    EXAMINATION:  XR CHEST AP PORTABLE    CLINICAL HISTORY:  Chest Pain;    TECHNIQUE:  Single frontal view of the chest was  performed.    COMPARISON:  04/08/2019    FINDINGS:  The heart is enlarged and unchanged.  Prominence of the hilar vessels is a stable finding.  The aorta is ectatic.  The lungs are currently clear.  There is no pleural effusion.  There are multiple old left rib fractures as well as an old left mid shaft clavicle fracture.                                  Pending Diagnostic Studies:     None         Medications:  Reconciled Home Medications:      Medication List      CONTINUE taking these medications    amLODIPine 10 MG tablet  Commonly known as: NORVASC  Take 1 tablet (10 mg total) by mouth once daily.     bisacodyL 10 mg Supp  Commonly known as: DULCOLAX  Place 1 suppository (10 mg total) rectally daily as needed (for constipation).     calcium-vitamin D 600 mg-10 mcg (400 unit) Tab  1 tab, Oral, BID, # 180 tab, 3 Refill(s), Pharmacy: [place], [place] MAIL ONLY, 180.34, cm, 11/04/22 8:23:00 CDT, Height/Length Measured, 87.1, kg, 11/04/22 8:23:00 CDT, Weight Dosing     docusate sodium 100 MG capsule  Commonly known as: COLACE  Take 1 capsule (100 mg total) by mouth 2 (two) times daily.     gabapentin 300 MG capsule  Commonly known as: NEURONTIN  Take 1 capsule (300 mg total) by mouth 3 (three) times daily.     simvastatin 40 MG tablet  Commonly known as: ZOCOR  Take 1 tablet (40 mg total) by mouth every evening.     tamsulosin 0.4 mg Cap  Commonly known as: FLOMAX  Take 1 capsule (0.4 mg total) by mouth every evening.        STOP taking these medications    amoxicillin-clavulanate 875-125mg 875-125 mg per tablet  Commonly known as: AUGMENTIN        ASK your doctor about these medications    oxyCODONE-acetaminophen  mg per tablet  Commonly known as: PERCOCET  Take 1 tablet by mouth every 4 (four) hours as needed.     predniSONE 5 MG tablet  Commonly known as: DELTASONE  Take 5 mg by mouth 2 (two) times daily.            Indwelling Lines/Drains at time of discharge:   Lines/Drains/Airways      None                 Time spent on the discharge of patient: 15 minutes         Dave Nieves MD  Department of [facility] Medicine  [place] - Intensive Care

## 2023-09-24 ENCOUNTER — HOSPITAL ENCOUNTER (EMERGENCY)
Facility: HOSPITAL | Age: 88
Discharge: HOME | End: 2023-09-24
Payer: MEDICARE

## 2023-09-24 VITALS
HEIGHT: 71 IN | RESPIRATION RATE: 18 BRPM | DIASTOLIC BLOOD PRESSURE: 52 MMHG | OXYGEN SATURATION: 97 % | WEIGHT: 183 LBS | TEMPERATURE: 99 F | HEART RATE: 97 BPM | BODY MASS INDEX: 25.62 KG/M2 | SYSTOLIC BLOOD PRESSURE: 110 MMHG

## 2023-09-24 DIAGNOSIS — R06.02 SOB (SHORTNESS OF BREATH) ON EXERTION: ICD-10-CM

## 2023-09-24 DIAGNOSIS — R19.7 DIARRHEA, UNSPECIFIED TYPE: Primary | ICD-10-CM

## 2023-09-24 LAB
ALBUMIN SERPL BCP-MCNC: 3.4 G/DL (ref 3.5–5.2)
ALP SERPL-CCNC: 54 U/L (ref 55–135)
ALT SERPL W/O P-5'-P-CCNC: 11 U/L (ref 10–44)
ANION GAP SERPL CALC-SCNC: 9 MMOL/L (ref 8–16)
AST SERPL-CCNC: 13 U/L (ref 10–40)
BASOPHILS # BLD AUTO: 0.03 K/UL (ref 0–0.2)
BASOPHILS NFR BLD: 0.5 % (ref 0–1.9)
BILIRUB SERPL-MCNC: 0.5 MG/DL (ref 0.1–1)
BILIRUB UR QL STRIP: NEGATIVE
BUN SERPL-MCNC: 21 MG/DL (ref 8–23)
CALCIUM SERPL-MCNC: 8.9 MG/DL (ref 8.7–10.5)
CHLORIDE SERPL-SCNC: 107 MMOL/L (ref 95–110)
CLARITY UR: CLEAR
CO2 SERPL-SCNC: 21 MMOL/L (ref 23–29)
COLOR UR: YELLOW
CREAT SERPL-MCNC: 1.2 MG/DL (ref 0.5–1.4)
DIFFERENTIAL METHOD: ABNORMAL
EOSINOPHIL # BLD AUTO: 0 K/UL (ref 0–0.5)
EOSINOPHIL NFR BLD: 0.5 % (ref 0–8)
ERYTHROCYTE [DISTWIDTH] IN BLOOD BY AUTOMATED COUNT: 16.9 % (ref 11.5–14.5)
EST. GFR  (NO RACE VARIABLE): 57.4 ML/MIN/1.73 M^2
GLUCOSE SERPL-MCNC: 119 MG/DL (ref 70–110)
GLUCOSE UR QL STRIP: NEGATIVE
HCT VFR BLD AUTO: 28.8 % (ref 40–54)
HGB BLD-MCNC: 9.4 G/DL (ref 14–18)
HGB UR QL STRIP: NEGATIVE
IMM GRANULOCYTES # BLD AUTO: 0.06 K/UL (ref 0–0.04)
IMM GRANULOCYTES NFR BLD AUTO: 1 % (ref 0–0.5)
KETONES UR QL STRIP: NEGATIVE
LEUKOCYTE ESTERASE UR QL STRIP: NEGATIVE
LYMPHOCYTES # BLD AUTO: 1.1 K/UL (ref 1–4.8)
LYMPHOCYTES NFR BLD: 18.4 % (ref 18–48)
MAGNESIUM SERPL-MCNC: 2.1 MG/DL (ref 1.6–2.6)
MCH RBC QN AUTO: 28.7 PG (ref 27–31)
MCHC RBC AUTO-ENTMCNC: 32.6 G/DL (ref 32–36)
MCV RBC AUTO: 88 FL (ref 82–98)
MONOCYTES # BLD AUTO: 0.4 K/UL (ref 0.3–1)
MONOCYTES NFR BLD: 7 % (ref 4–15)
NEUTROPHILS # BLD AUTO: 4.4 K/UL (ref 1.8–7.7)
NEUTROPHILS NFR BLD: 72.6 % (ref 38–73)
NITRITE UR QL STRIP: NEGATIVE
NRBC BLD-RTO: 0 /100 WBC
PH UR STRIP: 6 [PH] (ref 5–8)
PLATELET # BLD AUTO: 274 K/UL (ref 150–450)
PMV BLD AUTO: 8.9 FL (ref 9.2–12.9)
POTASSIUM SERPL-SCNC: 4.2 MMOL/L (ref 3.5–5.1)
PROT SERPL-MCNC: 6.4 G/DL (ref 6–8.4)
PROT UR QL STRIP: NEGATIVE
RBC # BLD AUTO: 3.27 M/UL (ref 4.6–6.2)
SARS-COV-2 RDRP RESP QL NAA+PROBE: NEGATIVE
SODIUM SERPL-SCNC: 137 MMOL/L (ref 136–145)
SP GR UR STRIP: 1.01 (ref 1–1.03)
TROPONIN I SERPL DL<=0.01 NG/ML-MCNC: <0.006 NG/ML (ref 0–0.03)
URN SPEC COLLECT METH UR: NORMAL
UROBILINOGEN UR STRIP-ACNC: NEGATIVE EU/DL
WBC # BLD AUTO: 5.99 K/UL (ref 3.9–12.7)

## 2023-09-24 PROCEDURE — 80053 COMPREHEN METABOLIC PANEL: CPT | Performed by: NURSE PRACTITIONER

## 2023-09-24 PROCEDURE — 93010 ELECTROCARDIOGRAM REPORT: CPT | Mod: ,,, | Performed by: INTERNAL MEDICINE

## 2023-09-24 PROCEDURE — 99284 EMERGENCY DEPT VISIT MOD MDM: CPT

## 2023-09-24 PROCEDURE — 81003 URINALYSIS AUTO W/O SCOPE: CPT | Performed by: NURSE PRACTITIONER

## 2023-09-24 PROCEDURE — 84484 ASSAY OF TROPONIN QUANT: CPT | Performed by: NURSE PRACTITIONER

## 2023-09-24 PROCEDURE — 85025 COMPLETE CBC W/AUTO DIFF WBC: CPT | Performed by: NURSE PRACTITIONER

## 2023-09-24 PROCEDURE — 83735 ASSAY OF MAGNESIUM: CPT | Performed by: NURSE PRACTITIONER

## 2023-09-24 PROCEDURE — 93010 EKG 12-LEAD: ICD-10-PCS | Mod: ,,, | Performed by: INTERNAL MEDICINE

## 2023-09-24 PROCEDURE — U0002 COVID-19 LAB TEST NON-CDC: HCPCS | Performed by: NURSE PRACTITIONER

## 2023-09-24 PROCEDURE — 93005 ELECTROCARDIOGRAM TRACING: CPT

## 2023-09-24 NOTE — ED PROVIDER NOTES
Encounter Date: 9/24/2023       History     Chief Complaint   Patient presents with    Diarrhea     X 2 days after chemo treatment .      Patient is a 89-year-old male presents emergency room with diarrhea that started this morning.  According to significant other patient has had a nonproductive cough with clear runny nose for the past few days.  Patient has received last chemo treatment on Friday.  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  Patient denies having any chest pain, nausea vomiting.  He denies any abdominal pain.  He has has dysuria at times.  But no discharge.  Patient states he has been more short of breath with exertion, significant other states this has become worse over the past week.  There is no reported fever, but did have chills last night around 10:00 p.m..  Blood pressure this morning home was systolic of 103 with a heart rate of 100, per family member.      Review of patient's allergies indicates:  No Known Allergies  Past Medical History:   Diagnosis Date    BPH (benign prostatic hyperplasia)     Hyperlipidemia     Hypertension     Prostate cancer     He was diagnosed in 2010.  He never received definitive treatment, but received androgen deprivation.  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  His PSA has risen on Lupron and Xtandi.  He is now being switched to Xgeva.    Prostate cancer metastatic to bone     He does complain of left hip pain, which has been worsening over the last couple of months.  This is consistent with his bone scan and CT findings.  I recommended palliative radiation treatment to the left acetabulum.     Past Surgical History:   Procedure Laterality Date    COLONOSCOPY      aprox 2008    COLONOSCOPY N/A 7/27/2020    Procedure: COLONOSCOPY;  Surgeon: Jersey Adam MD;  Location: [facility];  Service: General;  Laterality: N/A;    TONSILLECTOMY       Family History   Problem Relation Age of Onset    Cancer  Brother     Heart disease Daughter      Social History     Tobacco Use    Smoking status: Former     Current packs/day: 0.00     Types: Cigarettes     Quit date:      Years since quittin.7    Smokeless tobacco: Current     Types: Chew   Substance Use Topics    Alcohol use: Yes     Alcohol/week: 1.0 standard drink of alcohol     Types: 1 Shots of liquor per week     Comment: daily    Drug use: Never     Review of Systems   Constitutional:  Positive for chills and fatigue. Negative for fever.   HENT:  Positive for postnasal drip. Negative for sore throat.    Respiratory:  Positive for cough and shortness of breath (on exertion).    Cardiovascular:  Negative for chest pain.   Gastrointestinal:  Positive for diarrhea. Negative for abdominal pain, constipation, nausea and vomiting.   All other systems reviewed and are negative.      Physical Exam     Initial Vitals [23 1402]   BP Pulse Resp Temp SpO2   (!) 110/52 97 18 99.1 °F (37.3 °C) 97 %      MAP       --         Physical Exam    Nursing note and vitals reviewed.  Constitutional: He appears well-developed and well-nourished. He is not diaphoretic. No distress.   HENT:   Head: Normocephalic and atraumatic.   Mouth/Throat: Oropharynx is clear and moist.   Eyes: Conjunctivae and EOM are normal. Pupils are equal, round, and reactive to light. No scleral icterus.   Neck: Neck supple. No thyromegaly present.   Normal range of motion.  Cardiovascular:  Normal rate, regular rhythm, normal heart sounds and intact distal pulses.           No murmur heard.  Pulmonary/Chest: Breath sounds normal. No respiratory distress. He has no wheezes. He has no rhonchi. He has no rales. He exhibits no tenderness.   Abdominal: Abdomen is soft. Bowel sounds are normal. He exhibits no distension. There is no abdominal tenderness. There is no rebound and no guarding.   Musculoskeletal:         General: No tenderness or edema. Normal range of motion.      Cervical back: Normal  range of motion and neck supple.     Lymphadenopathy:     He has no cervical adenopathy.   Neurological: He is alert and oriented to person, place, and time. He has normal strength. No cranial nerve deficit. GCS score is 15. GCS eye subscore is 4. GCS verbal subscore is 5. GCS motor subscore is 6.   Skin: Skin is warm and dry. Capillary refill takes 2 to 3 seconds.   Psychiatric: He has a normal mood and affect. His behavior is normal. Judgment and thought content normal.         ED Course   Procedures  Labs Reviewed   CBC W/ AUTO DIFFERENTIAL - Abnormal; Notable for the following components:       Result Value    RBC 3.27 (*)     Hemoglobin 9.4 (*)     Hematocrit 28.8 (*)     RDW 16.9 (*)     MPV 8.9 (*)     Immature Granulocytes 1.0 (*)     Immature Grans (Abs) 0.06 (*)     All other components within normal limits   COMPREHENSIVE METABOLIC PANEL - Abnormal; Notable for the following components:    CO2 21 (*)     Glucose 119 (*)     Albumin 3.4 (*)     Alkaline Phosphatase 54 (*)     eGFR 57.4 (*)     All other components within normal limits   CLOSTRIDIUM DIFFICILE   MAGNESIUM   URINALYSIS, REFLEX TO URINE CULTURE    Narrative:     Preferred Collection Type->Urine, Clean Catch  Specimen Source->Urine   SARS-COV-2 RNA AMPLIFICATION, QUAL    Narrative:     Is the patient symptomatic?->No   TROPONIN I     EKG Readings: (Independently Interpreted)   Initial Reading: No STEMI.   Easy return by me shows sinus rhythm with occasional PACs, borderline first-degree AV block.  Ventricular rate 80, NH interval 206 milliseconds, QRS duration 90 milliseconds, no significant ST elevation depression.  No significant change from 08/23/2023 EKG.       Imaging Results    None          Medications   sodium chloride 0.9% bolus 1,000 mL 1,000 mL (has no administration in time range)     Medical Decision Making  Patient seen examined emergency room, no acute distress this time.  Detailed assessment as noted above.  Will get EKG, CBC,  CMP, magnesium, troponin, COVID test.  If patient was able to get a stool sample checking for C diff.    Differential diagnosis: C diff, COVID, flu, viral illness, gastritis, ileus, constipation, adverse reaction to medication, MI, PE, pneumonia    EKG reviewed, no changes.  CBC, CMP, magnesium, troponin were all within patient's normal limits when compared to recent labs.  COVID test was negative.  Patient was unable to give a stool sample at this time.  Advised the patient continues to have diarrhea, stool sample needs to be collected and taken to lab to be tested.  Follow up with the primary care provider next 3-5 days if needed.  Referral has been made to Cardiology further evaluation of shortness of breath on exertion has a stress test may be warranted.  Continue taking all previous prescribed medication as directed by your primary care team.  Return emergency room for any new other worrisome symptoms.    Amount and/or Complexity of Data Reviewed  Independent Historian: spouse     Details: As noted in HPI  External Data Reviewed: notes.     Details: Patient is being treated for prostate cancer by Dr. Hyatt.  Other records show patient also has a history of hypertension.  Labs: ordered.                               Clinical Impression:   Final diagnoses:  [R06.02] SOB (shortness of breath) on exertion  [R19.7] Diarrhea, unspecified type (Primary)        ED Disposition Condition    Discharge Stable          ED Prescriptions    None       Follow-up Information       Follow up With Specialties Details Why Contact Info    Renetta Matos NP Family Medicine In 3 days If symptoms worsen, As needed [address]  [place] [address]  [phone]               Marcus Hughes NP  09/24/23 1065

## 2023-09-24 NOTE — ED NOTES
Initial assessment complete. Pt presents with diarrhea and malaise onset 2 days s/p last cancer treatment. Daughter at bedside.

## 2023-09-24 NOTE — DISCHARGE INSTRUCTIONS
Follow up with the primary care provider next 3-5 days if needed.  Continue taking all previous prescribed medication as directed by your primary care team.    If diarrhea continues, collect a sample and follow up with primary care provider for directions on where to return in for lab testing for C diff.     Return emergency room for any new other worrisome symptoms.

## 2023-10-02 ENCOUNTER — TELEPHONE (OUTPATIENT)
Dept: FAMILY MEDICINE | Facility: CLINIC | Age: 88
End: 2023-10-02
Payer: MEDICARE

## 2023-10-02 NOTE — TELEPHONE ENCOUNTER
----- Message from Radha Schafer sent at 10/2/2023  2:04 PM CDT -----  Contact: JUSTICE, DAUGHTER  Type:  Needs Medical Advice    Who Called: JUSTICE, DAUGHTER   Would the patient rather a call back or a response via [facility]? CALL   Best Call Back Number: [phone] (home)   Additional Information: DAUGHTER STATED THAT WHEN PT GETS PAID HE LEAVES THE HOUSE FOR 4-5 DAYS WITHOUT CLOTHES, MEDICATION AND WONT SAY WHERE'S HE'S GOING. PT NORMALLY IS ESCORTED BACK HOME BY PEOPLE WHO KNOW HIM. PT HASN'T  BATH OVER A 3 MONTHS  PT DAUGHTER WOULD LIKE TO HAVE HIM EVALUATED AND REFER TO A ASSISTED LIVING FACILITY CLOSE TO HER HOME.   THANK YOU

## 2023-10-02 NOTE — TELEPHONE ENCOUNTER
Attempted to reach the daughter back . Pt will require an appointment for evaluation and possible referral.

## 2023-10-03 ENCOUNTER — TELEPHONE (OUTPATIENT)
Dept: FAMILY MEDICINE | Facility: CLINIC | Age: 88
End: 2023-10-03
Payer: MEDICARE

## 2023-10-03 NOTE — TELEPHONE ENCOUNTER
----- Message from Karolyn Brennan sent at 10/3/2023  4:08 PM CDT -----  Regarding: URGENT  Contact: JUSTICE CORINE- daughter    Type: Needs Medical Advice      Who Called:  JUSTICE POOL- elysia     Best Call Back Number:  [phone]    Additional Information: Patient is calling to speak with nurse/MA regarding (advised to read message)   Please call back and advise. Thanks

## 2023-10-03 NOTE — TELEPHONE ENCOUNTER
Spoke with the daughter . Pt is declining mentally the daughter is wanting him placed in an assistant living facility. Explained appointment would be required.

## 2023-10-03 NOTE — TELEPHONE ENCOUNTER
Handled in separate encounter and appt made for mental evaluation / possible testing. Daughter aware

## 2023-10-03 NOTE — TELEPHONE ENCOUNTER
----- Message from Cris Barger sent at 10/3/2023  1:04 PM CDT -----  Contact: Funmilayo  Type:  Needs Medical Advice    Who Called: Daughter Funmilayo  Would the patient rather a call back or a response via [facility]? call  Best Call Back Number:    Additional Information: pt would like to speak to the nurse. Daughter needs to speak to nurse to explain more of the situation to her. Please advise and thank you.

## 2023-10-10 ENCOUNTER — OFFICE VISIT (OUTPATIENT)
Dept: FAMILY MEDICINE | Facility: CLINIC | Age: 88
End: 2023-10-10
Payer: MEDICARE

## 2023-10-10 VITALS
BODY MASS INDEX: 26.07 KG/M2 | WEIGHT: 186.88 LBS | DIASTOLIC BLOOD PRESSURE: 60 MMHG | OXYGEN SATURATION: 98 % | SYSTOLIC BLOOD PRESSURE: 102 MMHG | HEART RATE: 88 BPM

## 2023-10-10 DIAGNOSIS — C40.22 MALIGNANT NEOPLASM OF LONG BONE OF LEFT LOWER EXTREMITY: Primary | ICD-10-CM

## 2023-10-10 DIAGNOSIS — Z91.89 AT RISK FOR ELDER ABUSE: ICD-10-CM

## 2023-10-10 DIAGNOSIS — Z79.899 HIGH RISK MEDICATIONS (NOT ANTICOAGULANTS) LONG-TERM USE: ICD-10-CM

## 2023-10-10 PROCEDURE — 99214 OFFICE O/P EST MOD 30 MIN: CPT | Mod: S$GLB,,, | Performed by: NURSE PRACTITIONER

## 2023-10-10 PROCEDURE — 1125F AMNT PAIN NOTED PAIN PRSNT: CPT | Mod: CPTII,S$GLB,, | Performed by: NURSE PRACTITIONER

## 2023-10-10 PROCEDURE — 99999 PR PBB SHADOW E&M-EST. PATIENT-LVL III: ICD-10-PCS | Mod: PBBFAC,,, | Performed by: NURSE PRACTITIONER

## 2023-10-10 PROCEDURE — 1101F PR PT FALLS ASSESS DOC 0-1 FALLS W/OUT INJ PAST YR: ICD-10-PCS | Mod: CPTII,S$GLB,, | Performed by: NURSE PRACTITIONER

## 2023-10-10 PROCEDURE — 3288F PR FALLS RISK ASSESSMENT DOCUMENTED: ICD-10-PCS | Mod: CPTII,S$GLB,, | Performed by: NURSE PRACTITIONER

## 2023-10-10 PROCEDURE — 99214 PR OFFICE/OUTPT VISIT, EST, LEVL IV, 30-39 MIN: ICD-10-PCS | Mod: S$GLB,,, | Performed by: NURSE PRACTITIONER

## 2023-10-10 PROCEDURE — 1101F PT FALLS ASSESS-DOCD LE1/YR: CPT | Mod: CPTII,S$GLB,, | Performed by: NURSE PRACTITIONER

## 2023-10-10 PROCEDURE — 1159F PR MEDICATION LIST DOCUMENTED IN MEDICAL RECORD: ICD-10-PCS | Mod: CPTII,S$GLB,, | Performed by: NURSE PRACTITIONER

## 2023-10-10 PROCEDURE — 3288F FALL RISK ASSESSMENT DOCD: CPT | Mod: CPTII,S$GLB,, | Performed by: NURSE PRACTITIONER

## 2023-10-10 PROCEDURE — 1159F MED LIST DOCD IN RCRD: CPT | Mod: CPTII,S$GLB,, | Performed by: NURSE PRACTITIONER

## 2023-10-10 PROCEDURE — 1125F PR PAIN SEVERITY QUANTIFIED, PAIN PRESENT: ICD-10-PCS | Mod: CPTII,S$GLB,, | Performed by: NURSE PRACTITIONER

## 2023-10-10 PROCEDURE — 99999 PR PBB SHADOW E&M-EST. PATIENT-LVL III: CPT | Mod: PBBFAC,,, | Performed by: NURSE PRACTITIONER

## 2023-10-10 RX ORDER — ONDANSETRON 4 MG/1
4 TABLET, ORALLY DISINTEGRATING ORAL EVERY 8 HOURS PRN
COMMUNITY
Start: 2023-05-26

## 2023-10-10 NOTE — PROGRESS NOTES
"Subjective:       Patient ID: Robert Nava is a 89 y.o. male.    Chief Complaint: Behavior Problem (Patient's daughter reports patient has had increased issues with behavior since the passing of his wife approximately 6 years ago. She reports she has a hard time getting patient to bathe or change clothes. She reports that the patient continues to hang out with "harlots/whores" that she advises come around when patient has money. She reports last month patient disappeared/went missing for 6 days with no notice. Reports patient drinks alcohol along with pain medication. )    Robert Nava presents today with his daughter for concerns related to behavior changes  He is under the care of Oncology/Urology for prostate cancer  Oncology: Dr. Hyatt with [facility]  Urology: [facility]   Behavior Problem:  Patient lives with daughter. Patient's daughter reports patient has had increased issues with behavior since the passing of his wife approximately 6 years ago. She reports she has a hard time getting patient to bathe or change clothes. She reports that the patient continues to hang out with "harlots/whores" that she advises come around when patient has money. She reports last month patient disappeared/went missing for 6 days with no notice. Reports patient drinks alcohol along with pain medication.   Patient reports that he does not drive and relies on others for transportation. He was with "female friends" when he went missing. Daughter reports following this he had no money to pay his bills.   They have started the process of obtaining Power of . Inquired about his medical decisions. No Living Will noted.           Review of Systems   Constitutional:  Negative for activity change, appetite change, chills, diaphoresis and fever.   HENT:  Negative for congestion, ear pain, postnasal drip, sinus pressure, sneezing and sore throat.    Eyes:  Negative for pain, discharge, redness and itching.   Respiratory:  Negative " for apnea, cough, chest tightness, shortness of breath and wheezing.    Cardiovascular:  Negative for chest pain and leg swelling.   Gastrointestinal:  Negative for abdominal distention, abdominal pain, constipation, diarrhea, nausea and vomiting.   Genitourinary:  Negative for difficulty urinating, dysuria, flank pain and frequency.   Musculoskeletal:  Positive for arthralgias and myalgias.   Skin:  Negative for color change, rash and wound.   Neurological:  Positive for weakness. Negative for dizziness.   Psychiatric/Behavioral:  Negative for agitation and confusion.    All other systems reviewed and are negative.      Patient Active Problem List   Diagnosis    Positive colorectal cancer screening using Cologuard test    Irregular heart rhythm    Acute rhinitis    Prostate cancer    Hyperlipidemia    Hypertension    Bone cancer    Drug-induced constipation    Acute alcoholic intoxication with complication    Prostate cancer metastatic to bone    BPH without obstruction/lower urinary tract symptoms    Right arm weakness    Acute pain of right shoulder due to trauma    Syncope and collapse    Mild closed head injury    Normocytic anemia       Objective:      Physical Exam  Vitals and nursing note reviewed.   Constitutional:       General: He is not in acute distress.     Appearance: Normal appearance. He is not ill-appearing.   Eyes:      General: Lids are normal.   Cardiovascular:      Rate and Rhythm: Normal rate and regular rhythm.      Heart sounds: Normal heart sounds.   Pulmonary:      Effort: Pulmonary effort is normal. No tachypnea or respiratory distress.      Breath sounds: Normal breath sounds. No wheezing.   Musculoskeletal:      Cervical back: Full passive range of motion without pain.   Skin:     General: Skin is warm and dry.      Findings: No rash.   Neurological:      General: No focal deficit present.      Mental Status: He is alert and oriented to person, place, and time.      Comments: MMSE 25/30    Psychiatric:         Mood and Affect: Mood normal.         Behavior: Behavior normal.         Thought Content: Thought content normal.         Judgment: Judgment normal.         Lab Results   Component Value Date    WBC 5.99 09/24/2023    HGB 9.4 (L) 09/24/2023    HCT 28.8 (L) 09/24/2023     09/24/2023    CHOL 158 08/24/2023    TRIG 98 08/24/2023    HDL 50 08/24/2023    ALT 11 09/24/2023    AST 13 09/24/2023     09/24/2023    K 4.2 09/24/2023     09/24/2023    CREATININE 1.2 09/24/2023    BUN 21 09/24/2023    CO2 21 (L) 09/24/2023    TSH 0.868 08/23/2023    INR 1.0 08/24/2023    HGBA1C 5.3 04/16/2018     The ASCVD Risk score (Lore SPEARS, et al., 2019) failed to calculate for the following reasons:    The 2019 ASCVD risk score is only valid for ages 40 to 79    The patient has a prior MI or stroke diagnosis  Visit Vitals  /60 (BP Location: Left arm, Patient Position: Sitting, BP Method: Medium (Manual))   Pulse 88   Wt 84.8 kg (186 lb 14.4 oz)   SpO2 98%   BMI 26.07 kg/m²      Assessment:       1. 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 High risk medications (not anticoagulants) long-term use    3. At risk for elder abuse        Plan:       1. 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   2. High risk medications (not anticoagulants) long-term use  Keep high risk medications locked/secured.  Do not mix medications with Alcohol.   3. At risk for elder abuse  Daughter is establishing POA, provided with paperwork to complete for Living Will.  MMSE 25/30- patient is aware of who he is and what he is doing. Discussed that mixing alcohol with his medications can alter this. Discussed with patient that he is a vulnerable population that is easily taken advantage of and he is encouraged to establish good support systems and delegate who is responsible for what to better protect his well being.       No follow-ups on file.      Future Appointments       Date Provider Specialty Appt Notes    1/18/2024 Sabra Nagel NP Family Medicine 3 month follow up

## 2023-11-27 PROBLEM — G89.11 ACUTE PAIN OF RIGHT SHOULDER DUE TO TRAUMA: Status: RESOLVED | Noted: 2023-08-23 | Resolved: 2023-11-27

## 2023-11-27 PROBLEM — M25.511 ACUTE PAIN OF RIGHT SHOULDER DUE TO TRAUMA: Status: RESOLVED | Noted: 2023-08-23 | Resolved: 2023-11-27

## 2024-05-01 ENCOUNTER — PATIENT MESSAGE (OUTPATIENT)
Dept: FAMILY MEDICINE | Facility: CLINIC | Age: 89
End: 2024-05-01
Payer: MEDICARE

## 2024-05-13 ENCOUNTER — HOSPITAL ENCOUNTER (EMERGENCY)
Facility: HOSPITAL | Age: 89
Discharge: HOME | End: 2024-05-13
Attending: EMERGENCY MEDICINE
Payer: MEDICARE

## 2024-05-13 VITALS
BODY MASS INDEX: 20.92 KG/M2 | RESPIRATION RATE: 17 BRPM | OXYGEN SATURATION: 96 % | TEMPERATURE: 98 F | HEART RATE: 108 BPM | DIASTOLIC BLOOD PRESSURE: 56 MMHG | WEIGHT: 150 LBS | SYSTOLIC BLOOD PRESSURE: 107 MMHG

## 2024-05-13 DIAGNOSIS — Z51.5 HOSPICE CARE PATIENT: ICD-10-CM

## 2024-05-13 DIAGNOSIS — K59.00 CONSTIPATION, UNSPECIFIED CONSTIPATION TYPE: Primary | ICD-10-CM

## 2024-05-13 PROCEDURE — 99283 EMERGENCY DEPT VISIT LOW MDM: CPT

## 2024-05-13 PROCEDURE — 25000003 PHARM REV CODE 250: Performed by: EMERGENCY MEDICINE

## 2024-05-13 RX ORDER — POLYETHYLENE GLYCOL 3350 17 G/17G
17 POWDER, FOR SOLUTION ORAL ONCE
Status: COMPLETED | OUTPATIENT
Start: 2024-05-13 | End: 2024-05-13

## 2024-05-13 RX ADMIN — POLYETHYLENE GLYCOL 3350 17 G: 17 POWDER, FOR SOLUTION ORAL at 01:05

## 2024-05-13 NOTE — ED PROVIDER NOTES
"Encounter Date: 5/13/2024       History     Chief Complaint   Patient presents with    Constipation     LBM - this morning, patient's wife states it was "so hard, I had to pull it out of him" - pt has hx of bone cancer and currently on hospice      89-year-old male presents with complaints of constipation.  He is currently under hospice care.  He and his family member report that they had not informed the hospice nurse prior to coming to the emergency room.  He has taken 1 dose of lactulose a week ago.  He has taken several stool softeners once daily or every other day over the past week but has not been consistently taking these and has not attempted use of MiraLax.  Denies fever abdominal pain or vomiting.  He is urinating normally.  He does not want any aggressive care treatment or evaluation other than assistance with his symptom of constipation which has been an ongoing intermittent problem.  He is currently under hospice care for metastatic prostate cancer.  He and his family member deny any other problems or complaints.        Review of patient's allergies indicates:  No Known Allergies  Past Medical History:   Diagnosis Date    BPH (benign prostatic hyperplasia)     Hyperlipidemia     Hypertension     Prostate cancer     He was diagnosed in 2010.  He never received definitive treatment, but received androgen deprivation.  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  His PSA has risen on Lupron and Xtandi.  He is now being switched to Xgeva.    Prostate cancer metastatic to bone     He does complain of left hip pain, which has been worsening over the last couple of months.  This is consistent with his bone scan and CT findings.  I recommended palliative radiation treatment to the left acetabulum.     Past Surgical History:   Procedure Laterality Date    COLONOSCOPY      aprox 2008    COLONOSCOPY N/A 7/27/2020    Procedure: COLONOSCOPY;  Surgeon: Jersey Adam MD;  Location: [facility];  Service: General; "  Laterality: N/A;    TONSILLECTOMY       Family History   Problem Relation Name Age of Onset    Cancer Brother      Heart disease Daughter       Social History     Tobacco Use    Smoking status: Former     Current packs/day: 0.00     Types: Cigarettes     Quit date:      Years since quittin.4    Smokeless tobacco: Current     Types: Chew   Substance Use Topics    Alcohol use: Yes     Alcohol/week: 1.0 standard drink of alcohol     Types: 1 Shots of liquor per week     Comment: daily    Drug use: Never     Review of Systems   Gastrointestinal:  Positive for constipation. Negative for anal bleeding, blood in stool, diarrhea, nausea, rectal pain and vomiting.   Musculoskeletal:         Chronic pain associated with metastatic cancer ongoing and under hospice care   All other systems reviewed and are negative.      Physical Exam     Initial Vitals [24 1127]   BP Pulse Resp Temp SpO2   (!) 110/55 109 17 98 °F (36.7 °C) 97 %      MAP       --         Physical Exam    Constitutional: He is cooperative.  Non-toxic appearance. He does not have a sickly appearance. He does not appear ill. No distress.   HENT:   Head: Normocephalic and atraumatic.   Nose: Nose normal.   Mouth/Throat: Uvula is midline, oropharynx is clear and moist and mucous membranes are normal.   Eyes: Conjunctivae, EOM and lids are normal. Pupils are equal, round, and reactive to light.   Neck: Neck supple. No stridor present.   Normal range of motion.   Full passive range of motion without pain.     Cardiovascular:  Normal rate, normal heart sounds, intact distal pulses and normal pulses.     Exam reveals no gallop, no distant heart sounds, no friction rub and no decreased pulses.       No murmur heard.  Pulmonary/Chest: Effort normal and breath sounds normal. No tachypnea. No respiratory distress.   Abdominal: Abdomen is soft and protuberant. Bowel sounds are normal. He exhibits no pulsatile midline mass. No signs of injury. There is no  abdominal tenderness. No hernia.   No right CVA tenderness.  No left CVA tenderness.   Musculoskeletal:      Cervical back: Normal, full passive range of motion without pain, normal range of motion and neck supple. No pain with movement. Normal range of motion.      Thoracic back: No tenderness. Normal range of motion.      Lumbar back: Normal. No tenderness. Normal range of motion.      Comments: Pulses 2+ throughout, no extremity abnormalities     Neurological: He is alert and oriented to person, place, and time. He has normal strength. No cranial nerve deficit or sensory deficit. Coordination normal.   No focal deficits   Skin: Skin is warm and dry. Capillary refill takes less than 2 seconds. No rash noted.   Psychiatric: He has a normal mood and affect. His speech is normal and behavior is normal. Thought content normal.         ED Course   Procedures  Labs Reviewed - No data to display       Imaging Results    None          Medications   polyethylene glycol packet 17 g (17 g Oral Given 5/13/24 1300)     Medical Decision Making  I have called to discuss the patient's case with his hospice nurse Anatoly.  She was not aware the patient was coming to the emergency room.  The patient and his family member do report that they did not call hospice to inform them of this problem that he has been having.  I have discussed treatment options including aggressive investigation with lab testing, CT scan as well as possible enema if CT scan is not demonstrating evidence that this could cause further harm.  Have discussed oral treatment as well.  The patient and his family member do not want aggressive investigation but just symptomatic treatment.  The patient does understand that any day could be his last day due to his medical condition.  Have discussed options here.  The patient again does not want aggressive investigation.  Hospice reports that he will not have to get discharged from hospice and readmitted if only minor  treatment is given in the emergency room.  I have discussed this option with the patient in his family as well.  I have discussed treatment with oral MiraLax here followed by discharge home then in home hospice evaluation for further care and management.  The patient and his family member are agreeable with and prefer this plan as opposed to aggressive investigation or other treatment.  I have discussed in detail that he can have treatment in the emergency room at any point for any problems and we can do as little or as much as he requests but at this point the patient is comfortable going home to continue his hospice care with this current plan.    Risk  OTC drugs.                                      Clinical Impression:  Final diagnoses:  [K59.00] Constipation, unspecified constipation type (Primary)  [Z51.5] Hospice care patient          ED Disposition Condition    Discharge Fair          ED Prescriptions    None       Follow-up Information       Follow up With Specialties Details Why Contact Info    [facility] in home care    Please call your nurse when leaving to have in home evaluation today             Daphnie Barbosa MD  05/14/24 9125

## 2024-05-13 NOTE — ED NOTES
Brief applied to patient for ride home - dressed patient and assisted into wheelchair - wheeled patient out to wife waiting in car and assisted patient into vehicle.  Neither patient nor spouse have any further questions or concerns.

## 2024-05-13 NOTE — DISCHARGE INSTRUCTIONS
Please read and follow discharge instructions and return precautions.  Avoid any overheating.  Keep well hydrated.  Alternate water and Pedialyte for hydration.  Please call your hospice nurse when you are leaving the ER so she can evaluate you in your home today.  Please call your hospice nurse at any point if you have any questions regarding your care or if you are the family member the care of your loved one.  You may return to the emergency room at any point if you need our care.

## 2024-05-19 ENCOUNTER — HOSPITAL ENCOUNTER (INPATIENT)
Facility: HOSPITAL | Age: 89
LOS: 3 days | Discharge: HOSPICE | DRG: 682 | End: 2024-05-22
Attending: EMERGENCY MEDICINE | Admitting: STUDENT IN AN ORGANIZED HEALTH CARE EDUCATION/TRAINING PROGRAM
Payer: MEDICARE

## 2024-05-19 DIAGNOSIS — R07.9 CHEST PAIN: ICD-10-CM

## 2024-05-19 DIAGNOSIS — N17.9 AKI (ACUTE KIDNEY INJURY): ICD-10-CM

## 2024-05-19 DIAGNOSIS — R53.1 WEAKNESS: ICD-10-CM

## 2024-05-19 DIAGNOSIS — J96.01 ACUTE HYPOXIC RESPIRATORY FAILURE: Primary | ICD-10-CM

## 2024-05-19 PROBLEM — G93.41 ACUTE METABOLIC ENCEPHALOPATHY: Status: ACTIVE | Noted: 2024-05-19

## 2024-05-19 PROBLEM — J69.0 ASPIRATION PNEUMONIA OF BOTH LOWER LOBES: Status: ACTIVE | Noted: 2024-05-19

## 2024-05-19 PROBLEM — E87.5 HYPERKALEMIA: Status: ACTIVE | Noted: 2024-05-19

## 2024-05-19 LAB
ALBUMIN SERPL BCP-MCNC: 2.4 G/DL (ref 3.5–5.2)
ALP SERPL-CCNC: 339 U/L (ref 55–135)
ALT SERPL W/O P-5'-P-CCNC: 10 U/L (ref 10–44)
ANION GAP SERPL CALC-SCNC: 13 MMOL/L (ref 8–16)
AST SERPL-CCNC: 29 U/L (ref 10–40)
BASOPHILS # BLD AUTO: 0.06 K/UL (ref 0–0.2)
BASOPHILS NFR BLD: 0.2 % (ref 0–1.9)
BILIRUB SERPL-MCNC: 2.4 MG/DL (ref 0.1–1)
BILIRUB UR QL STRIP: NEGATIVE
BNP SERPL-MCNC: 164 PG/ML (ref 0–99)
BUN SERPL-MCNC: 87 MG/DL (ref 10–30)
CALCIUM SERPL-MCNC: 7.3 MG/DL (ref 8.7–10.5)
CHLORIDE SERPL-SCNC: 103 MMOL/L (ref 95–110)
CLARITY UR: ABNORMAL
CO2 SERPL-SCNC: 20 MMOL/L (ref 23–29)
COLOR UR: YELLOW
CREAT SERPL-MCNC: 3.6 MG/DL (ref 0.5–1.4)
DIFFERENTIAL METHOD BLD: ABNORMAL
EOSINOPHIL # BLD AUTO: 0.1 K/UL (ref 0–0.5)
EOSINOPHIL NFR BLD: 0.5 % (ref 0–8)
ERYTHROCYTE [DISTWIDTH] IN BLOOD BY AUTOMATED COUNT: 22.9 % (ref 11.5–14.5)
EST. GFR  (NO RACE VARIABLE): 15.3 ML/MIN/1.73 M^2
GLUCOSE SERPL-MCNC: 108 MG/DL (ref 70–110)
GLUCOSE SERPL-MCNC: 90 MG/DL (ref 70–110)
GLUCOSE UR QL STRIP: NEGATIVE
HCT VFR BLD AUTO: 28.7 % (ref 40–54)
HGB BLD-MCNC: 8.8 G/DL (ref 14–18)
HGB UR QL STRIP: NEGATIVE
IMM GRANULOCYTES # BLD AUTO: 0.65 K/UL (ref 0–0.04)
IMM GRANULOCYTES NFR BLD AUTO: 2.4 % (ref 0–0.5)
KETONES UR QL STRIP: NEGATIVE
LACTATE SERPL-SCNC: 1.6 MMOL/L (ref 0.5–1.9)
LEUKOCYTE ESTERASE UR QL STRIP: NEGATIVE
LYMPHOCYTES # BLD AUTO: 2 K/UL (ref 1–4.8)
LYMPHOCYTES NFR BLD: 7.4 % (ref 18–48)
MAGNESIUM SERPL-MCNC: 2.8 MG/DL (ref 1.6–2.6)
MCH RBC QN AUTO: 24.8 PG (ref 27–31)
MCHC RBC AUTO-ENTMCNC: 30.7 G/DL (ref 32–36)
MCV RBC AUTO: 81 FL (ref 82–98)
MONOCYTES # BLD AUTO: 1.3 K/UL (ref 0.3–1)
MONOCYTES NFR BLD: 4.9 % (ref 4–15)
NEUTROPHILS # BLD AUTO: 23.4 K/UL (ref 1.8–7.7)
NEUTROPHILS NFR BLD: 84.6 % (ref 38–73)
NITRITE UR QL STRIP: NEGATIVE
NRBC BLD-RTO: 0 /100 WBC
PH UR STRIP: 5 [PH] (ref 5–8)
PHOSPHATE SERPL-MCNC: 5.7 MG/DL (ref 2.7–4.5)
PLATELET # BLD AUTO: 390 K/UL (ref 150–450)
PMV BLD AUTO: 9.8 FL (ref 9.2–12.9)
POTASSIUM SERPL-SCNC: 6.3 MMOL/L (ref 3.5–5.1)
PROT SERPL-MCNC: 5.6 G/DL (ref 6–8.4)
PROT UR QL STRIP: ABNORMAL
RBC # BLD AUTO: 3.55 M/UL (ref 4.6–6.2)
SODIUM SERPL-SCNC: 136 MMOL/L (ref 136–145)
SP GR UR STRIP: 1.02 (ref 1–1.03)
TROPONIN I SERPL HS-MCNC: 13.2 PG/ML (ref 0–14.9)
URN SPEC COLLECT METH UR: ABNORMAL
UROBILINOGEN UR STRIP-ACNC: ABNORMAL EU/DL
WBC # BLD AUTO: 27.56 K/UL (ref 3.9–12.7)

## 2024-05-19 PROCEDURE — 96368 THER/DIAG CONCURRENT INF: CPT

## 2024-05-19 PROCEDURE — 85025 COMPLETE CBC W/AUTO DIFF WBC: CPT | Performed by: EMERGENCY MEDICINE

## 2024-05-19 PROCEDURE — 99285 EMERGENCY DEPT VISIT HI MDM: CPT | Mod: 25

## 2024-05-19 PROCEDURE — 87154 CUL TYP ID BLD PTHGN 6+ TRGT: CPT | Performed by: EMERGENCY MEDICINE

## 2024-05-19 PROCEDURE — 80053 COMPREHEN METABOLIC PANEL: CPT | Performed by: EMERGENCY MEDICINE

## 2024-05-19 PROCEDURE — 87040 BLOOD CULTURE FOR BACTERIA: CPT | Mod: 59 | Performed by: EMERGENCY MEDICINE

## 2024-05-19 PROCEDURE — 63600175 PHARM REV CODE 636 W HCPCS: Performed by: EMERGENCY MEDICINE

## 2024-05-19 PROCEDURE — 81003 URINALYSIS AUTO W/O SCOPE: CPT | Performed by: EMERGENCY MEDICINE

## 2024-05-19 PROCEDURE — 11000001 HC ACUTE MED/SURG PRIVATE ROOM

## 2024-05-19 PROCEDURE — 36415 COLL VENOUS BLD VENIPUNCTURE: CPT | Performed by: EMERGENCY MEDICINE

## 2024-05-19 PROCEDURE — 83605 ASSAY OF LACTIC ACID: CPT | Performed by: EMERGENCY MEDICINE

## 2024-05-19 PROCEDURE — 25000003 PHARM REV CODE 250: Performed by: EMERGENCY MEDICINE

## 2024-05-19 PROCEDURE — 96375 TX/PRO/DX INJ NEW DRUG ADDON: CPT

## 2024-05-19 PROCEDURE — 82962 GLUCOSE BLOOD TEST: CPT

## 2024-05-19 PROCEDURE — 84100 ASSAY OF PHOSPHORUS: CPT | Performed by: EMERGENCY MEDICINE

## 2024-05-19 PROCEDURE — 96365 THER/PROPH/DIAG IV INF INIT: CPT

## 2024-05-19 PROCEDURE — 63600175 PHARM REV CODE 636 W HCPCS: Performed by: HOSPITALIST

## 2024-05-19 PROCEDURE — 84484 ASSAY OF TROPONIN QUANT: CPT | Performed by: EMERGENCY MEDICINE

## 2024-05-19 PROCEDURE — 96367 TX/PROPH/DG ADDL SEQ IV INF: CPT

## 2024-05-19 PROCEDURE — 25000003 PHARM REV CODE 250: Performed by: HOSPITALIST

## 2024-05-19 PROCEDURE — 83880 ASSAY OF NATRIURETIC PEPTIDE: CPT | Performed by: EMERGENCY MEDICINE

## 2024-05-19 PROCEDURE — 83735 ASSAY OF MAGNESIUM: CPT | Performed by: EMERGENCY MEDICINE

## 2024-05-19 RX ORDER — BISACODYL 10 MG/1
10 SUPPOSITORY RECTAL DAILY PRN
Status: DISCONTINUED | OUTPATIENT
Start: 2024-05-19 | End: 2024-05-22 | Stop reason: HOSPADM

## 2024-05-19 RX ORDER — ACETAMINOPHEN 325 MG/1
650 TABLET ORAL EVERY 4 HOURS PRN
Status: DISCONTINUED | OUTPATIENT
Start: 2024-05-19 | End: 2024-05-22 | Stop reason: HOSPADM

## 2024-05-19 RX ORDER — SODIUM CHLORIDE 0.9 % (FLUSH) 0.9 %
10 SYRINGE (ML) INJECTION EVERY 12 HOURS PRN
Status: DISCONTINUED | OUTPATIENT
Start: 2024-05-19 | End: 2024-05-22 | Stop reason: HOSPADM

## 2024-05-19 RX ORDER — IBUPROFEN 200 MG
16 TABLET ORAL
Status: DISCONTINUED | OUTPATIENT
Start: 2024-05-19 | End: 2024-05-21

## 2024-05-19 RX ORDER — GLUCAGON 1 MG
1 KIT INJECTION
Status: DISCONTINUED | OUTPATIENT
Start: 2024-05-19 | End: 2024-05-21

## 2024-05-19 RX ORDER — ONDANSETRON 4 MG/1
4 TABLET, ORALLY DISINTEGRATING ORAL EVERY 6 HOURS PRN
Status: DISCONTINUED | OUTPATIENT
Start: 2024-05-19 | End: 2024-05-22 | Stop reason: HOSPADM

## 2024-05-19 RX ORDER — AMOXICILLIN 250 MG
1 CAPSULE ORAL 2 TIMES DAILY
Status: DISCONTINUED | OUTPATIENT
Start: 2024-05-20 | End: 2024-05-22 | Stop reason: HOSPADM

## 2024-05-19 RX ORDER — SODIUM BICARBONATE 1 MEQ/ML
50 SYRINGE (ML) INTRAVENOUS
Status: COMPLETED | OUTPATIENT
Start: 2024-05-19 | End: 2024-05-19

## 2024-05-19 RX ORDER — NALOXONE HCL 0.4 MG/ML
0.02 VIAL (ML) INJECTION
Status: DISCONTINUED | OUTPATIENT
Start: 2024-05-19 | End: 2024-05-22 | Stop reason: HOSPADM

## 2024-05-19 RX ORDER — IBUPROFEN 200 MG
24 TABLET ORAL
Status: DISCONTINUED | OUTPATIENT
Start: 2024-05-19 | End: 2024-05-21

## 2024-05-19 RX ORDER — PROCHLORPERAZINE EDISYLATE 5 MG/ML
5 INJECTION INTRAMUSCULAR; INTRAVENOUS EVERY 6 HOURS PRN
Status: DISCONTINUED | OUTPATIENT
Start: 2024-05-19 | End: 2024-05-22 | Stop reason: HOSPADM

## 2024-05-19 RX ORDER — CALCIUM GLUCONATE 20 MG/ML
1 INJECTION, SOLUTION INTRAVENOUS
Status: COMPLETED | OUTPATIENT
Start: 2024-05-19 | End: 2024-05-19

## 2024-05-19 RX ORDER — DEXTROMETHORPHAN POLISTIREX 30 MG/5 ML
1 SUSPENSION, EXTENDED RELEASE 12 HR ORAL DAILY PRN
Status: DISCONTINUED | OUTPATIENT
Start: 2024-05-19 | End: 2024-05-22 | Stop reason: HOSPADM

## 2024-05-19 RX ORDER — SODIUM CHLORIDE 450 MG/100ML
INJECTION, SOLUTION INTRAVENOUS CONTINUOUS
Status: DISCONTINUED | OUTPATIENT
Start: 2024-05-20 | End: 2024-05-19

## 2024-05-19 RX ORDER — HEPARIN SODIUM 5000 [USP'U]/ML
5000 INJECTION, SOLUTION INTRAVENOUS; SUBCUTANEOUS EVERY 8 HOURS
Status: DISCONTINUED | OUTPATIENT
Start: 2024-05-20 | End: 2024-05-22 | Stop reason: HOSPADM

## 2024-05-19 RX ORDER — POLYETHYLENE GLYCOL 3350 17 G/17G
17 POWDER, FOR SOLUTION ORAL DAILY
Status: DISCONTINUED | OUTPATIENT
Start: 2024-05-20 | End: 2024-05-22 | Stop reason: HOSPADM

## 2024-05-19 RX ORDER — SODIUM CHLORIDE 9 MG/ML
INJECTION, SOLUTION INTRAVENOUS CONTINUOUS
Status: DISCONTINUED | OUTPATIENT
Start: 2024-05-20 | End: 2024-05-22 | Stop reason: HOSPADM

## 2024-05-19 RX ORDER — TALC
3 POWDER (GRAM) TOPICAL NIGHTLY PRN
Status: DISCONTINUED | OUTPATIENT
Start: 2024-05-19 | End: 2024-05-22 | Stop reason: HOSPADM

## 2024-05-19 RX ORDER — ALUMINUM HYDROXIDE, MAGNESIUM HYDROXIDE, AND SIMETHICONE 1200; 120; 1200 MG/30ML; MG/30ML; MG/30ML
30 SUSPENSION ORAL 4 TIMES DAILY PRN
Status: DISCONTINUED | OUTPATIENT
Start: 2024-05-19 | End: 2024-05-22 | Stop reason: HOSPADM

## 2024-05-19 RX ORDER — ACETAMINOPHEN 325 MG/1
650 TABLET ORAL EVERY 8 HOURS PRN
Status: DISCONTINUED | OUTPATIENT
Start: 2024-05-19 | End: 2024-05-21

## 2024-05-19 RX ADMIN — AMPICILLIN AND SULBACTAM 1.5 G: 1; .5 INJECTION, POWDER, FOR SOLUTION INTRAVENOUS at 11:05

## 2024-05-19 RX ADMIN — DEXTROSE MONOHYDRATE 25 G: 25 INJECTION, SOLUTION INTRAVENOUS at 10:05

## 2024-05-19 RX ADMIN — SODIUM ZIRCONIUM CYCLOSILICATE 10 G: 10 POWDER, FOR SUSPENSION ORAL at 10:05

## 2024-05-19 RX ADMIN — SODIUM BICARBONATE 50 MEQ: 84 INJECTION INTRAVENOUS at 10:05

## 2024-05-19 RX ADMIN — SODIUM CHLORIDE 2040 ML: 9 INJECTION, SOLUTION INTRAVENOUS at 09:05

## 2024-05-19 RX ADMIN — SODIUM CHLORIDE: 9 INJECTION, SOLUTION INTRAVENOUS at 11:05

## 2024-05-19 RX ADMIN — CEFTRIAXONE SODIUM 1 G: 1 INJECTION, POWDER, FOR SOLUTION INTRAMUSCULAR; INTRAVENOUS at 09:05

## 2024-05-19 RX ADMIN — CALCIUM GLUCONATE 1 G: 20 INJECTION, SOLUTION INTRAVENOUS at 10:05

## 2024-05-19 RX ADMIN — AZITHROMYCIN DIHYDRATE 500 MG: 500 INJECTION, POWDER, LYOPHILIZED, FOR SOLUTION INTRAVENOUS at 10:05

## 2024-05-19 RX ADMIN — INSULIN HUMAN 5 UNITS: 100 INJECTION, SOLUTION PARENTERAL at 10:05

## 2024-05-19 NOTE — Clinical Note
Diagnosis: RIP (acute kidney injury) [384046]   Future Attending Provider: MAYDA CASTRO [[id number]]   Admit to which facility:: [facility] [[id number]]   Reason for IP Medical Treatment  (Clinical interventions that can only be accomplished in the IP setting? ) :: IVF, IV abx   I certify that Inpatient services for greater than or equal to 2 midnights are medically necessary:: Yes   Plans for Post-Acute care--if anticipated (pick the single best option):: [facility]   Special Needs:: No Special Needs [1]

## 2024-05-20 PROBLEM — E43 SEVERE PROTEIN-CALORIE MALNUTRITION: Chronic | Status: ACTIVE | Noted: 2024-05-20

## 2024-05-20 LAB
ALBUMIN SERPL BCP-MCNC: 2.1 G/DL (ref 3.5–5.2)
ALP SERPL-CCNC: 293 U/L (ref 55–135)
ALT SERPL W/O P-5'-P-CCNC: 10 U/L (ref 10–44)
ANION GAP SERPL CALC-SCNC: 12 MMOL/L (ref 8–16)
AST SERPL-CCNC: 26 U/L (ref 10–40)
BASOPHILS # BLD AUTO: 0.04 K/UL (ref 0–0.2)
BASOPHILS NFR BLD: 0.2 % (ref 0–1.9)
BILIRUB SERPL-MCNC: 2.3 MG/DL (ref 0.1–1)
BUN SERPL-MCNC: 75 MG/DL (ref 10–30)
CALCIUM SERPL-MCNC: 7.2 MG/DL (ref 8.7–10.5)
CHLORIDE SERPL-SCNC: 104 MMOL/L (ref 95–110)
CO2 SERPL-SCNC: 22 MMOL/L (ref 23–29)
CREAT SERPL-MCNC: 3.1 MG/DL (ref 0.5–1.4)
DIFFERENTIAL METHOD BLD: ABNORMAL
EOSINOPHIL # BLD AUTO: 0.1 K/UL (ref 0–0.5)
EOSINOPHIL NFR BLD: 0.4 % (ref 0–8)
ERYTHROCYTE [DISTWIDTH] IN BLOOD BY AUTOMATED COUNT: 22.8 % (ref 11.5–14.5)
EST. GFR  (NO RACE VARIABLE): 18.3 ML/MIN/1.73 M^2
GLUCOSE SERPL-MCNC: 108 MG/DL (ref 70–110)
HCT VFR BLD AUTO: 29.1 % (ref 40–54)
HGB BLD-MCNC: 8.8 G/DL (ref 14–18)
IMM GRANULOCYTES # BLD AUTO: 0.54 K/UL (ref 0–0.04)
IMM GRANULOCYTES NFR BLD AUTO: 2.3 % (ref 0–0.5)
LACTATE SERPL-SCNC: 1.7 MMOL/L (ref 0.5–1.9)
LYMPHOCYTES # BLD AUTO: 1.4 K/UL (ref 1–4.8)
LYMPHOCYTES NFR BLD: 6.1 % (ref 18–48)
MAGNESIUM SERPL-MCNC: 2.5 MG/DL (ref 1.6–2.6)
MCH RBC QN AUTO: 24.9 PG (ref 27–31)
MCHC RBC AUTO-ENTMCNC: 30.2 G/DL (ref 32–36)
MCV RBC AUTO: 82 FL (ref 82–98)
MONOCYTES # BLD AUTO: 1.5 K/UL (ref 0.3–1)
MONOCYTES NFR BLD: 6.3 % (ref 4–15)
NEUTROPHILS # BLD AUTO: 19.9 K/UL (ref 1.8–7.7)
NEUTROPHILS NFR BLD: 84.7 % (ref 38–73)
NRBC BLD-RTO: 0 /100 WBC
PLATELET # BLD AUTO: 327 K/UL (ref 150–450)
PMV BLD AUTO: 10.1 FL (ref 9.2–12.9)
POCT GLUCOSE: 102 MG/DL (ref 70–110)
POCT GLUCOSE: 108 MG/DL (ref 70–110)
POCT GLUCOSE: 108 MG/DL (ref 70–110)
POCT GLUCOSE: 117 MG/DL (ref 70–110)
POTASSIUM SERPL-SCNC: 5.3 MMOL/L (ref 3.5–5.1)
PROT SERPL-MCNC: 5.2 G/DL (ref 6–8.4)
RBC # BLD AUTO: 3.53 M/UL (ref 4.6–6.2)
SODIUM SERPL-SCNC: 138 MMOL/L (ref 136–145)
WBC # BLD AUTO: 23.49 K/UL (ref 3.9–12.7)

## 2024-05-20 PROCEDURE — 27000221 HC OXYGEN, UP TO 24 HOURS

## 2024-05-20 PROCEDURE — 25000003 PHARM REV CODE 250: Performed by: HOSPITALIST

## 2024-05-20 PROCEDURE — 96367 TX/PROPH/DG ADDL SEQ IV INF: CPT

## 2024-05-20 PROCEDURE — 25000003 PHARM REV CODE 250: Performed by: NURSE PRACTITIONER

## 2024-05-20 PROCEDURE — 80053 COMPREHEN METABOLIC PANEL: CPT | Performed by: HOSPITALIST

## 2024-05-20 PROCEDURE — 63600175 PHARM REV CODE 636 W HCPCS: Performed by: HOSPITALIST

## 2024-05-20 PROCEDURE — 96361 HYDRATE IV INFUSION ADD-ON: CPT

## 2024-05-20 PROCEDURE — 99900035 HC TECH TIME PER 15 MIN (STAT)

## 2024-05-20 PROCEDURE — 85025 COMPLETE CBC W/AUTO DIFF WBC: CPT | Performed by: HOSPITALIST

## 2024-05-20 PROCEDURE — 92610 EVALUATE SWALLOWING FUNCTION: CPT

## 2024-05-20 PROCEDURE — 83605 ASSAY OF LACTIC ACID: CPT | Performed by: EMERGENCY MEDICINE

## 2024-05-20 PROCEDURE — 97535 SELF CARE MNGMENT TRAINING: CPT

## 2024-05-20 PROCEDURE — 99221 1ST HOSP IP/OBS SF/LOW 40: CPT | Mod: ,,, | Performed by: FAMILY MEDICINE

## 2024-05-20 PROCEDURE — 83735 ASSAY OF MAGNESIUM: CPT | Performed by: HOSPITALIST

## 2024-05-20 PROCEDURE — 36415 COLL VENOUS BLD VENIPUNCTURE: CPT | Performed by: EMERGENCY MEDICINE

## 2024-05-20 PROCEDURE — 94761 N-INVAS EAR/PLS OXIMETRY MLT: CPT

## 2024-05-20 PROCEDURE — 11000001 HC ACUTE MED/SURG PRIVATE ROOM

## 2024-05-20 RX ORDER — OXYCODONE AND ACETAMINOPHEN 10; 325 MG/1; MG/1
1 TABLET ORAL EVERY 4 HOURS PRN
Status: DISCONTINUED | OUTPATIENT
Start: 2024-05-20 | End: 2024-05-22 | Stop reason: HOSPADM

## 2024-05-20 RX ORDER — NOREPINEPHRINE BITARTRATE/D5W 4MG/250ML
0-3 PLASTIC BAG, INJECTION (ML) INTRAVENOUS CONTINUOUS
Status: DISCONTINUED | OUTPATIENT
Start: 2024-05-20 | End: 2024-05-20

## 2024-05-20 RX ORDER — PREDNISONE 5 MG/1
5 TABLET ORAL 2 TIMES DAILY
Status: DISCONTINUED | OUTPATIENT
Start: 2024-05-20 | End: 2024-05-22 | Stop reason: HOSPADM

## 2024-05-20 RX ORDER — ATORVASTATIN CALCIUM 10 MG/1
10 TABLET, FILM COATED ORAL DAILY
Status: DISCONTINUED | OUTPATIENT
Start: 2024-05-20 | End: 2024-05-22 | Stop reason: HOSPADM

## 2024-05-20 RX ORDER — TAMSULOSIN HYDROCHLORIDE 0.4 MG/1
0.4 CAPSULE ORAL NIGHTLY
Status: DISCONTINUED | OUTPATIENT
Start: 2024-05-20 | End: 2024-05-22 | Stop reason: HOSPADM

## 2024-05-20 RX ORDER — MORPHINE SULFATE 2 MG/ML
2 INJECTION, SOLUTION INTRAMUSCULAR; INTRAVENOUS EVERY 4 HOURS PRN
Status: DISCONTINUED | OUTPATIENT
Start: 2024-05-20 | End: 2024-05-22 | Stop reason: HOSPADM

## 2024-05-20 RX ORDER — GABAPENTIN 100 MG/1
100 CAPSULE ORAL 3 TIMES DAILY
Status: DISCONTINUED | OUTPATIENT
Start: 2024-05-20 | End: 2024-05-22 | Stop reason: HOSPADM

## 2024-05-20 RX ADMIN — SODIUM CHLORIDE 500 ML: 9 INJECTION, SOLUTION INTRAVENOUS at 07:05

## 2024-05-20 RX ADMIN — OXYCODONE HYDROCHLORIDE AND ACETAMINOPHEN 1 TABLET: 10; 325 TABLET ORAL at 04:05

## 2024-05-20 RX ADMIN — SODIUM CHLORIDE: 9 INJECTION, SOLUTION INTRAVENOUS at 06:05

## 2024-05-20 RX ADMIN — OXYCODONE HYDROCHLORIDE AND ACETAMINOPHEN 1 TABLET: 10; 325 TABLET ORAL at 09:05

## 2024-05-20 RX ADMIN — AMPICILLIN SODIUM AND SULBACTAM SODIUM 1.5 G: 1; .5 INJECTION, POWDER, FOR SOLUTION INTRAMUSCULAR; INTRAVENOUS at 12:05

## 2024-05-20 RX ADMIN — PREDNISONE 5 MG: 5 TABLET ORAL at 09:05

## 2024-05-20 RX ADMIN — ATORVASTATIN CALCIUM 10 MG: 10 TABLET, FILM COATED ORAL at 09:05

## 2024-05-20 RX ADMIN — SODIUM CHLORIDE: 9 INJECTION, SOLUTION INTRAVENOUS at 11:05

## 2024-05-20 RX ADMIN — SENNOSIDES AND DOCUSATE SODIUM 1 TABLET: 50; 8.6 TABLET ORAL at 09:05

## 2024-05-20 RX ADMIN — AMPICILLIN SODIUM AND SULBACTAM SODIUM 1.5 G: 1; .5 INJECTION, POWDER, FOR SOLUTION INTRAMUSCULAR; INTRAVENOUS at 11:05

## 2024-05-20 RX ADMIN — HEPARIN SODIUM 5000 UNITS: 5000 INJECTION, SOLUTION INTRAVENOUS; SUBCUTANEOUS at 09:05

## 2024-05-20 RX ADMIN — GABAPENTIN 100 MG: 100 CAPSULE ORAL at 04:05

## 2024-05-20 RX ADMIN — GABAPENTIN 100 MG: 100 CAPSULE ORAL at 09:05

## 2024-05-20 RX ADMIN — PREDNISONE 5 MG: 5 TABLET ORAL at 03:05

## 2024-05-20 RX ADMIN — TAMSULOSIN HYDROCHLORIDE 0.4 MG: 0.4 CAPSULE ORAL at 09:05

## 2024-05-20 NOTE — H&P
"  [facility] - Emergency Dept  Hospital Medicine  History & Physical    Patient Name: Robert Nava  MRN: [id number]  Patient Class: IP- Inpatient  Admission Date: 5/19/2024  Attending Physician: Juanpablo Prescott MD   Primary Care Provider: Carlie, Primary Doctor         Patient information was obtained from patient, spouse/SO, and ER records.     Subjective:     Principal Problem: General malaise, RIP/pneumonia    Chief Complaint:   Chief Complaint   Patient presents with    Altered Mental Status     Arrived via EMS from home. Per ems, family states that pt is hospice but full code. Pt has been refusing to eat or drink for last week and has had some intermittent confusion. Pt was also 70s RA sats.     Cough     Wet cough        HPI: 89M p/w general malaise characterized as "feeling badly" w/ associated anorexia resulting in poor PO intake of solids and liquids for several days. His wife (Rdaha Nava) who was present during our encounter endorses that he has also had non-productive, "wet" sounding cough, reduced mobility, and visible respiratory discomfort. He has reportedly otherwise been taking all of his prescribed medications, and he has reportedly not consumed alcohol since moving in with Radha in [facility] in October 2023. Patient had been enrolled with [facility] here in [place].    Past Medical History:   Diagnosis Date    BPH (benign prostatic hyperplasia)     Hyperlipidemia     Hypertension     Prostate cancer     He was diagnosed in 2010.  He never received definitive treatment, but received androgen deprivation.  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  His PSA has risen on Lupron and Xtandi.  He is now being switched to Xgeva.    Prostate cancer metastatic to bone     He does complain of left hip pain, which has been worsening over the last couple of months.  This is consistent with his bone scan and CT findings.  I recommended palliative radiation treatment to the left " acetabulum.       Past Surgical History:   Procedure Laterality Date    COLONOSCOPY      aprox     COLONOSCOPY N/A 2020    Procedure: COLONOSCOPY;  Surgeon: Jersey Adam MD;  Location: [facility];  Service: General;  Laterality: N/A;    TONSILLECTOMY         Review of patient's allergies indicates:  No Known Allergies    No current facility-administered medications on file prior to encounter.     Current Outpatient Medications on File Prior to Encounter   Medication Sig    amLODIPine (NORVASC) 10 MG tablet Take 1 tablet (10 mg total) by mouth once daily.    bisacodyL (DULCOLAX) 10 mg Supp Place 1 suppository (10 mg total) rectally daily as needed (for constipation).    calcium-vitamin D 600 mg-10 mcg (400 unit) Tab 1 tab, Oral, BID, # 180 tab, 3 Refill(s), Pharmacy: [place], [place] MAIL ONLY, 180.34, cm, 22 8:23:00 CDT, Height/Length Measured, 87.1, kg, 22 8:23:00 CDT, Weight Dosing    docusate sodium (COLACE) 100 MG capsule Take 1 capsule (100 mg total) by mouth 2 (two) times daily. (Patient not taking: Reported on 10/10/2023)    gabapentin (NEURONTIN) 300 MG capsule Take 1 capsule (300 mg total) by mouth 3 (three) times daily.    ondansetron (ZOFRAN-ODT) 4 MG TbDL Take 4 mg by mouth every 8 (eight) hours as needed.    oxyCODONE-acetaminophen (PERCOCET)  mg per tablet Take 1 tablet by mouth every 4 (four) hours as needed.    predniSONE (DELTASONE) 5 MG tablet Take 5 mg by mouth 2 (two) times daily.    simvastatin (ZOCOR) 40 MG tablet Take 1 tablet (40 mg total) by mouth every evening.    tamsulosin (FLOMAX) 0.4 mg Cap Take 1 capsule (0.4 mg total) by mouth every evening.     Family History       Problem Relation (Age of Onset)    Cancer Brother    Heart disease Daughter          Tobacco Use    Smoking status: Former     Current packs/day: 0.00     Types: Cigarettes     Quit date:      Years since quittin.4    Smokeless tobacco: Current     Types: Chew    Substance and Sexual Activity    Alcohol use: Yes     Alcohol/week: 1.0 standard drink of alcohol     Types: 1 Shots of liquor per week     Comment: daily    Drug use: Never    Sexual activity: Not Currently     Review of Systems   Constitutional:  Positive for appetite change and fatigue. Negative for fever.   Respiratory:  Positive for cough and shortness of breath.    Gastrointestinal:  Positive for constipation. Negative for diarrhea and vomiting.   Neurological:  Positive for weakness. Negative for seizures and headaches.   Psychiatric/Behavioral:  Positive for confusion and decreased concentration. Negative for agitation. The patient is not hyperactive.      Objective:     Vital Signs (Most Recent):  Temp: 98.3 °F (36.8 °C) (05/19/24 2033)  Pulse: 94 (05/19/24 2130)  Resp: 18 (05/19/24 2130)  BP: (!) 115/53 (05/19/24 2130)  SpO2: (!) 93 % (05/19/24 2130) Vital Signs (24h Range):  Temp:  [98.3 °F (36.8 °C)] 98.3 °F (36.8 °C)  Pulse:  [93-96] 94  Resp:  [18-20] 18  SpO2:  [77 %-93 %] 93 %  BP: ()/(51-54) 115/53     Weight: 68 kg (150 lb)  Body mass index is 20.92 kg/m².     Physical Exam  Vitals and nursing note reviewed. Exam conducted with a chaperone present.   Constitutional:       General: He is not in acute distress.     Appearance: He is ill-appearing. He is not toxic-appearing or diaphoretic.   Cardiovascular:      Rate and Rhythm: Normal rate and regular rhythm.      Heart sounds: No murmur heard.     Comments: Distant heart sounds  Pulmonary:      Effort: Pulmonary effort is normal.      Breath sounds: Rhonchi (bilat lateral segs) present. No wheezing.   Abdominal:      General: Bowel sounds are normal. There is no distension.      Palpations: Abdomen is soft.      Tenderness: There is no abdominal tenderness.   Musculoskeletal:      Right lower leg: No edema.      Left lower leg: No edema.      Comments: Diffuse muscle wasting, temporal muscles reduced, cachexia, pony prominence over forehead  as well as L clavicle   Skin:     Findings: Bruising present.   Neurological:      Mental Status: He is alert.      Comments: AAOx1 (person only)   Psychiatric:      Comments: Follows one-step commands                Significant Labs: All pertinent labs within the past 24 hours have been reviewed.  CBC:   Recent Labs   Lab 05/19/24 2041   WBC 27.56*   HGB 8.8*   HCT 28.7*        CMP:   Recent Labs   Lab 05/19/24 2041      K 6.3*      CO2 20*   GLU 90   BUN 87*   CREATININE 3.6*   CALCIUM 7.3*   PROT 5.6*   ALBUMIN 2.4*   BILITOT 2.4*   ALKPHOS 339*   AST 29   ALT 10   ANIONGAP 13       Significant Imaging: I have reviewed all pertinent imaging results/findings within the past 24 hours.  CXR: I have reviewed all pertinent results/findings within the past 24 hours and my personal findings are:  bibasilar air space disease  Assessment/Plan:     89M on home hospice for metastatic prostate cancer p/w general malaise from suspected aspiration pneumonia and multi-organ sequelae    Hyperkalemia  Acute, 2/2 RIP, renal diet advised. Shifting Rx given in ED. Cont monitoring on tele.    Acute hypoxic respiratory failure  Evidenced by SpO2 77% on RA on presentation w/ tachypnea, resp sx, and bilat ASD on chest imaging. Currently on 5LPM.    Acute metabolic encephalopathy  Evidenced by disorientation and confusion, per wife, which is acute change from baseline. Most likely 2/2 metabolic effects of RIP, rx effects from gabapentin/opiates      RIP (acute kidney injury)  Baseline Cr 1.1-1.2 from age-related decline in eGFR, currently 3.6 w/ BUN 87 most likely 2/2 hypovolemia from infxn and poor PO intake. Cont /hr tonight, strict I/O, and repeat Cr in AM. Stop IVF if improved, to avoid vol overload.    Aspiration pneumonia of both lower lobes  Evidenced by severe leukocytosis, bibasilar ASD on CXR, cough/resp sx, requiring 5LPM o2, and suspected silent aspiration of oral secretions. D/w Dr. Cazares in ED,  he reports he has given CTX/Azithro. Will adjust the CTX to renally dosed Unasyn tonight + continue Azithro. Get SLP eval in AM. Trend WBC.     Normocytic anemia  Baseline Hgb 8.5-9.4, currently 8.8, 2/2 ACID (anemia of chronic inflam disease) from prostate cancer    BPH without obstruction/lower urinary tract symptoms  D/w RN, retaining 1L in ED, straight cath. Perform q6h bladder scan and if > 500, place garcia tonight. Cont flomax      Hypertension  Given infxn, hold home Rx tonight.    Prostate cancer  Reported bone mets, c/w Alk Phos of 340 and urinary outflow obstruction tonight. Cont home gabapentin but at 100mg TID (instead of 300mg TID), given renal fxn. PRN tylenol; hold opiates, given his current encephalopathy, but low threshold to resume in patient who was previously electing hospice care. CM consult placed to resume hospice care on discharge; PT/OT assessments to gauge whether this is approp in the home setting.      MDPOA is wife Radha Nava; patient voiced that he is DNR    VTE Risk Mitigation (From admission, onward)           Ordered     heparin (porcine) injection 5,000 Units  Every 8 hours         05/19/24 2305     IP VTE HIGH RISK PATIENT  Once         05/19/24 2305     Place sequential compression device  Until discontinued         05/19/24 2305                                    MAYDA CASTRO MD  Department of Hospital Medicine  [facility] - Emergency Dept

## 2024-05-20 NOTE — PLAN OF CARE
Recommendations  Downgrade Renal diet to level 5 minced and moist foods until SLP eval  Added Novosource renal with all meals  Consider Clinimix plain 4.25/5 vs NaCl IVF  Collaborate with health care providers  May benefit from appetite stimulant if Hospice agrees     Goal: intake > 25% upon RD F/U visit     Nutrition Goal Status: new  Communication of RD Recs: reviewed with physician  Nutrition Related Social Determinants of Health: SDOH: Adequate food in home environment     Assessment and Plan  Nutrition Problem  Severe Chronic Malnutrition     Related to (etiology):   Metastatic cancer; anorexia     Signs and Symptoms (as evidenced by):   18% wt loss within 6 months; fat and muscle mass depletion; pressure wound      Interventions/Recommendations (treatment strategy):  Await SLP eval; modified diet texture to level 5; added Novosource renal nutritional supplement all meals; suggest Clinimix plain 4.25/5 in place of NaCl to provide some nutrition.  Family does not want PEG; he receives Hospice care and now a DNR.      Nutrition Diagnosis Status:   New     Malnutrition Assessment  Malnutrition Context: chronic illness  Malnutrition Level: severe  Skin (Micronutrient): wounds unhealed  Hair/Scalp (Micronutrient): sparse, scaly/flaky, fine  Teeth (Micronutrient): edentulous  Neck/Chest (Micronutrient): bony prominence, muscle wasting   Micronutrient Evaluation Summary: suspected deficiency   Weight Loss (Malnutrition): greater than 10% in 6 months  Energy Intake (Malnutrition): less than 75% for greater than or equal to 1 month  Subcutaneous Fat (Malnutrition): severe depletion  Muscle Mass (Malnutrition): severe depletion  Fluid Accumulation (Malnutrition): mild   Orbital Region (Subcutaneous Fat Loss): moderate depletion   [place] Region (Muscle Loss): severe depletion  Clavicle Bone Region (Muscle Loss): severe depletion

## 2024-05-20 NOTE — CONSULTS
[facility]/Surg  Adult Nutrition  Consult Note    SUMMARY     Recommendations  Downgrade Renal diet to level 5 minced and moist foods until SLP eval  Added Novosource renal with all meals  Consider Clinimix plain 4.25/5 vs NaCl IVF  Collaborate with health care providers  May benefit from appetite stimulant if Hospice agrees    Goal: intake > 25% upon RD F/U visit    Nutrition Goal Status: new  Communication of RD Recs: reviewed with physician  Nutrition Related Social Determinants of Health: SDOH: Adequate food in home environment    Assessment and Plan  Nutrition Problem  Severe Chronic Malnutrition    Related to (etiology):   Metastatic cancer; anorexia    Signs and Symptoms (as evidenced by):   18% wt loss within 6 months; fat and muscle mass depletion; pressure wound     Interventions/Recommendations (treatment strategy):  Await SLP eval; modified diet texture to level 5; added Novosource renal nutritional supplement all meals; suggest Clinimix plain 4.25/5 in place of NaCl to provide some nutrition.  Family does not want PEG; he receives Hospice care and now a DNR.     Nutrition Diagnosis Status:   New    Malnutrition Assessment  Malnutrition Context: chronic illness  Malnutrition Level: severe  Skin (Micronutrient): wounds unhealed  Hair/Scalp (Micronutrient): sparse, scaly/flaky, fine  Teeth (Micronutrient): edentulous  Neck/Chest (Micronutrient): bony prominence, muscle wasting   Micronutrient Evaluation Summary: suspected deficiency   Weight Loss (Malnutrition): greater than 10% in 6 months  Energy Intake (Malnutrition): less than 75% for greater than or equal to 1 month  Subcutaneous Fat (Malnutrition): severe depletion  Muscle Mass (Malnutrition): severe depletion  Fluid Accumulation (Malnutrition): mild   Orbital Region (Subcutaneous Fat Loss): moderate depletion   [place] Region (Muscle Loss): severe depletion  Clavicle Bone Region (Muscle Loss): severe depletion           Reason for  "Assessment  Dx: RIP; Metastatic Cancer to bone ( primary site prostate); Aspiration pneumonia; Acute metabolic encephalopathy; Acute Respiratory failure  PMH:  prostate cancer; HTN;   Reason For Assessment: consult  Diagnosis: other (see comments)  Interdisciplinary Rounds: did not attend  Nutrition Discharge Planning: pending but probably home with continued Hospice care    Nutrition Risk Screen    Nutrition Risk Screen: reduced oral intake over the last month, unintentional loss of 10 lbs or more in the past 2 months    Nutrition/Diet History    Patient Reported Diet/Restrictions/Preferences: general  Spiritual, Cultural Beliefs, Mosque Practices, Values that Affect Care: no  Food Allergies: NKFA  Factors Affecting Nutritional Intake: nausea/vomiting, decreased appetite    Anthropometrics  Wt Readings from Last 13 Encounters:   05/20/24 68 kg (149 lb 14.6 oz)   05/13/24 68 kg (150 lb)   10/10/23 84.8 kg (186 lb 14.4 oz)   09/24/23 83 kg (183 lb)   08/24/23 83.1 kg (183 lb 3.2 oz)   08/05/23 83 kg (183 lb)   07/16/23 83 kg (183 lb)   07/02/23 88 kg (194 lb 0.1 oz)   04/25/23 86.6 kg (190 lb 14.7 oz)   04/18/23 88.1 kg (194 lb 4.3 oz)   01/18/23 88.7 kg (195 lb 8.8 oz)   10/18/22 85.2 kg (187 lb 13.3 oz)   06/24/21 84.8 kg (187 lb)     Temp: 97.5 °F (36.4 °C)  Height: 5' 11" (180.3 cm)  Height (inches): 71 in  Weight Method: Bed Scale  Weight: 68 kg (149 lb 14.6 oz)  Weight (lb): 149.91 lb  Ideal Body Weight (IBW), Male: 172 lb  % Ideal Body Weight, Male (lb): 87.16 %  BMI (Calculated): 20.9  BMI Grade: 18.5-24.9 - normal     Lab/Procedures/Meds   Latest Reference Range & Units Most Recent   Hemoglobin 14.0 - 18.0 g/dL 8.8 (L)  5/20/24 04:38   Hematocrit 40.0 - 54.0 % 29.1 (L)  5/20/24 04:38   (L): Data is abnormally low   Latest Reference Range & Units Most Recent   Albumin 3.5 - 5.2 g/dL 2.1 (L)  5/20/24 04:38   (L): Data is abnormally low   Latest Reference Range & Units Most Recent   Potassium 3.5 - 5.1 " mmol/L 5.3 (H)  5/20/24 04:38   (H): Data is abnormally high  Pertinent Labs Reviewed: reviewed  Pertinent Medications Reviewed: reviewed    Physical Findings/Assessment  Pressure injury to sacrum     Estimated/Assessed Needs    Weight Used For Calorie Calculations: 68 kg (149 lb 14.6 oz)  Energy Calorie Requirements (kcal): 2000 calories daily;  30/kg  Energy Need Method: Kcal/kg  Protein Requirements: 100 gm protein daily;  1.5/kg  Weight Used For Protein Calculations: 68 kg (149 lb 14.6 oz)     Estimated Fluid Requirement Method: RDA Method  RDA Method (mL): 2000     Nutrition Prescription Ordered    Current Diet Order: Renal Bite cut; 2000 ml FR  Oral Nutrition Supplement: none    Evaluation of Received Nutrient/Fluid Intake    Intake/Output Summary (Last 24 hours) at 5/20/2024 1414  Last data filed at 5/20/2024 0058  Gross per 24 hour   Intake 2540 ml   Output 1050 ml   Net 1490 ml     Energy Calories Required: meeting needs  Protein Required: not meeting needs  Fluid Required: meeting needs  Tolerance: not tolerating  % Intake of Estimated Energy Needs: 0 - 25 %  % Meal Intake: 0 - 25 %    Nutrition Risk    Level of Risk/Frequency of Follow-up: high 2 x week    Monitor and Evaluation    Food and Nutrient Intake: food and beverage intake  Food and Nutrient Adminstration: diet order  Knowledge/Beliefs/Attitudes: beliefs and attitudes  Physical Activity and Function: nutrition-related ADLs and IADLs  Anthropometric Measurements: weight change  Biochemical Data, Medical Tests and Procedures: electrolyte and renal panel, glucose/endocrine profile, inflammatory profile, other (specify)  Nutrition-Focused Physical Findings: overall appearance, skin     Nutrition Follow-Up  yes

## 2024-05-20 NOTE — ASSESSMENT & PLAN NOTE
Evidenced by severe leukocytosis, bibasilar ASD on CXR, cough/resp sx, requiring 5LPM o2, and suspected silent aspiration of oral secretions. D/w Dr. Cazares in ED, he reports he has given CTX/Azithro. Will adjust the CTX to renally dosed Unasyn tonight + continue Azithro. Get SLP eval in AM. Trend WBC.

## 2024-05-20 NOTE — PLAN OF CARE
Problem: Adult Inpatient Plan of Care  Goal: Plan of Care Review  Outcome: Progressing     Problem: Acute Kidney Injury/Impairment  Goal: Fluid and Electrolyte Balance  Outcome: Progressing     Problem: Acute Kidney Injury/Impairment  Goal: Improved Oral Intake  Outcome: Progressing     Problem: Wound  Goal: Absence of Infection Signs and Symptoms  Outcome: Progressing     Problem: Wound  Goal: Skin Health and Integrity  Outcome: Progressing     Problem: Wound  Goal: Optimal Wound Healing  Outcome: Progressing     Problem: Fall Injury Risk  Goal: Absence of Fall and Fall-Related Injury  Outcome: Progressing

## 2024-05-20 NOTE — ASSESSMENT & PLAN NOTE
Baseline Cr 1.1-1.2 from age-related decline in eGFR, currently 3.6 w/ BUN 87 most likely 2/2 hypovolemia from infxn and poor PO intake. Cont /hr tonight, strict I/O, and repeat Cr in AM. Stop IVF if improved, to avoid vol overload.

## 2024-05-20 NOTE — ASSESSMENT & PLAN NOTE
Evidenced by SpO2 77% on RA on presentation w/ tachypnea, resp sx, and bilat ASD on chest imaging. Currently on 5LPM.

## 2024-05-20 NOTE — SUBJECTIVE & OBJECTIVE
Interval History:  patient with generalized weakness.  He is not eating or drinking.  Wife at bedside states when he eats he does have a cough.   Patient likely with aspiration pneumonia.  Renal function remains decline creatinine 3.1, K 5.3     Long discussion with wife at bedside guarding goals of care.  Patient is presently with [facility] outpatient.  Upon questioning the patient he shows to remain in the hospital or go to inpatient hospice for comfort care.  Discussed with case management team plan of care.   Will not initiate vasopressors  or NG tube.  Wife  agreeable.  Is a very reasonable decision given patient's history of metastatic cancer to the bone and liver in addition to sepsis.    Review of Systems   Constitutional:  Positive for activity change, appetite change and fatigue.   Respiratory:  Positive for cough and shortness of breath.    Cardiovascular:  Negative for chest pain and leg swelling.   Gastrointestinal:  Negative for diarrhea and nausea.   Neurological:  Positive for weakness.     Objective:     Vital Signs (Most Recent):  Temp: 97.2 °F (36.2 °C) (05/20/24 0747)  Pulse: 74 (05/20/24 0747)  Resp: 18 (05/20/24 0747)  BP: (!) 107/51 (05/20/24 0911)  SpO2: (!) 93 % (05/20/24 0747) Vital Signs (24h Range):  Temp:  [97.2 °F (36.2 °C)-98.3 °F (36.8 °C)] 97.2 °F (36.2 °C)  Pulse:  [74-98] 74  Resp:  [14-20] 18  SpO2:  [77 %-96 %] 93 %  BP: ()/(48-66) 107/51     Weight: 68 kg (150 lb)  Body mass index is 20.92 kg/m².    Intake/Output Summary (Last 24 hours) at 5/20/2024 1044  Last data filed at 5/20/2024 0058  Gross per 24 hour   Intake 2540 ml   Output 1050 ml   Net 1490 ml         Physical Exam  Constitutional:       Appearance: He is ill-appearing.   Cardiovascular:      Rate and Rhythm: Normal rate.      Heart sounds: Murmur heard.   Pulmonary:      Effort: Pulmonary effort is normal.      Breath sounds: Rhonchi present.      Comments:  On 5 liters NC  Abdominal:      General:  Abdomen is flat.   Skin:     Findings: Bruising present.   Neurological:      Motor: Weakness (generalized) present.      Comments:  Oriented to self and place minimally conversing              Significant Labs: All pertinent labs within the past 24 hours have been reviewed.  BMP:   Recent Labs   Lab 05/20/24  0438         K 5.3*      CO2 22*   BUN 75*   CREATININE 3.1*   CALCIUM 7.2*   MG 2.5     CBC:   Recent Labs   Lab 05/19/24  2041 05/20/24  0438   WBC 27.56* 23.49*   HGB 8.8* 8.8*   HCT 28.7* 29.1*    327       Significant Imaging: I have reviewed all pertinent imaging results/findings within the past 24 hours.

## 2024-05-20 NOTE — PROGRESS NOTES
Pharmacist Renal Dose Adjustment Note    Robert Nava is a 89 y.o. male being treated with the medication unasyn    Patient Data:    Vital Signs (Most Recent):  Temp: 98.3 °F (36.8 °C) (05/19/24 2033)  Pulse: 83 (05/20/24 0500)  Resp: 16 (05/20/24 0500)  BP: (!) 97/52 (05/20/24 0500)  SpO2: 96 % (05/20/24 0500) Vital Signs (72h Range):  Temp:  [98.3 °F (36.8 °C)]   Pulse:  [83-98]   Resp:  [14-20]   BP: ()/(50-66)   SpO2:  [77 %-96 %]      Recent Labs   Lab 05/19/24 2041 05/20/24 0438   CREATININE 3.6* 3.1*     Serum creatinine: 3.1 mg/dL (H) 05/20/24 0438  Estimated creatinine clearance: 14.9 mL/min (A)    Medication:unasyn dose: 1.5g frequency q8h will be changed to medication:unasyn dose:1.5g frequency:q12h    Pharmacist's Name: Mike King  Pharmacist's Extension: [phone]

## 2024-05-20 NOTE — ASSESSMENT & PLAN NOTE
Patient has metastatic cancer of  prostate primary. The cancer has metastasized to  bone. The patient is under the care of an outpatient oncologist. The patient is not undergoing active chemotherapy. .Their staging information is listed below.   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  2. Peritoneal nodularity anterior to liver.  Peritoneal carcinomatosis not excluded.  3. Diffuse osseous metastatic disease.  Cancer Staging   No matching staging information was found for the patient.     Plan for inpatient hospice

## 2024-05-20 NOTE — ASSESSMENT & PLAN NOTE
Evidenced by disorientation and confusion, per wife, which is acute change from baseline. Most likely 2/2 metabolic effects of RIP, rx effects from gabapentin/opiates

## 2024-05-20 NOTE — ASSESSMENT & PLAN NOTE
Evidenced by severe leukocytosis, bibasilar ASD on CXR, cough/resp sx, requiring 5LPM o2, and suspected silent aspiration of oral secretions. D/w Dr. Cazares in ED, he reports he has given CTX/Azithro. Will adjust the CTX to renally dosed Unasyn tonight + continue Azithro. Get SLP eval in AM. Trend WBC.    Continue pleasure feedings

## 2024-05-20 NOTE — ASSESSMENT & PLAN NOTE
D/w RN, retaining 1L in ED, straight cath. Perform q6h bladder scan and if > 500, place garcia tonight. Cont flomax

## 2024-05-20 NOTE — PT/OT/SLP PROGRESS
Occupational Therapy      Patient Name:  Robert Nava   MRN:  [id number]    Patient is on home hospice and is at or close to baseline with ADLs. No acute OT needs at this time.    5/20/2024

## 2024-05-20 NOTE — CARE UPDATE
05/20/24 1341   PRE-TX-O2   Device (Oxygen Therapy) nasal cannula   Flow (L/min) (Oxygen Therapy) 4   SpO2 96 %   Pulse 65   Resp 18   Respiratory Evaluation   $ Care Plan Tech Time 15 min   Evaluation For New Orders   Home Oxygen   Has Home Oxygen? Yes   Liter Flow 3   Duration continuous   Route nasal cannula   Mode continuous   Device home concentrator   Home Aerosol, MDI, DPI, and Other Treatments/Therapies   Home Respiratory Therapy Per Patient/Review of Chart No   Oxygen Care Plan   Oxygen Care Plan Per Protocol   Bronchodilator Care Plan   Rationale No Rationale found   Atelectasis Care Plan   Rationale No Rational Found   Airway Clearance Care Plan   Rationale No rationale found

## 2024-05-20 NOTE — PROGRESS NOTES
"[facility] Medicine  Progress Note    Patient Name: Robert Nava  MRN: [id number]  Patient Class: IP- Inpatient   Admission Date: 5/19/2024  Length of Stay: 1 days  Attending Physician: Neftaly Ledezma MD  Primary Care Provider: Carlie, Primary Doctor        Subjective:     Principal Problem:RIP (acute kidney injury)        HPI:  89M p/w general malaise characterized as "feeling badly" w/ associated anorexia resulting in poor PO intake of solids and liquids for several days. His wife (Radha Nava) who was present during our encounter endorses that he has also had non-productive, "wet" sounding cough, reduced mobility, and visible respiratory discomfort. He has reportedly otherwise been taking all of his prescribed medications, and he has reportedly not consumed alcohol since moving in with Radha in [facility] in October 2023. Patient had been enrolled with [facility] here in [place].    Overview/Hospital Course:  No notes on file    Interval History:  patient with generalized weakness.  He is not eating or drinking.  Wife at bedside states when he eats he does have a cough.   Patient likely with aspiration pneumonia.  Renal function remains decline creatinine 3.1, K 5.3     Long discussion with wife at bedside guarding goals of care.  Patient is presently with [facility] hosp[facility] outpatient.  Upon questioning the patient he shows to remain in the hospital or go to inpatient hospice for comfort care.  Discussed with case management team plan of care.   Will not initiate vasopressors  or NG tube.  Wife  agreeable.  Is a very reasonable decision given patient's history of metastatic cancer to the bone and liver in addition to sepsis.    Review of Systems   Constitutional:  Positive for activity change, appetite change and fatigue.   Respiratory:  Positive for cough and shortness of breath.    Cardiovascular:  Negative for chest pain and leg swelling. "   Gastrointestinal:  Negative for diarrhea and nausea.   Neurological:  Positive for weakness.     Objective:     Vital Signs (Most Recent):  Temp: 97.2 °F (36.2 °C) (05/20/24 0747)  Pulse: 74 (05/20/24 0747)  Resp: 18 (05/20/24 0747)  BP: (!) 107/51 (05/20/24 0911)  SpO2: (!) 93 % (05/20/24 0747) Vital Signs (24h Range):  Temp:  [97.2 °F (36.2 °C)-98.3 °F (36.8 °C)] 97.2 °F (36.2 °C)  Pulse:  [74-98] 74  Resp:  [14-20] 18  SpO2:  [77 %-96 %] 93 %  BP: ()/(48-66) 107/51     Weight: 68 kg (150 lb)  Body mass index is 20.92 kg/m².    Intake/Output Summary (Last 24 hours) at 5/20/2024 1044  Last data filed at 5/20/2024 0058  Gross per 24 hour   Intake 2540 ml   Output 1050 ml   Net 1490 ml         Physical Exam  Constitutional:       Appearance: He is ill-appearing.   Cardiovascular:      Rate and Rhythm: Normal rate.      Heart sounds: Murmur heard.   Pulmonary:      Effort: Pulmonary effort is normal.      Breath sounds: Rhonchi present.      Comments:  On 5 liters NC  Abdominal:      General: Abdomen is flat.   Skin:     Findings: Bruising present.   Neurological:      Motor: Weakness (generalized) present.      Comments:  Oriented to self and place minimally conversing              Significant Labs: All pertinent labs within the past 24 hours have been reviewed.  BMP:   Recent Labs   Lab 05/20/24  0438         K 5.3*      CO2 22*   BUN 75*   CREATININE 3.1*   CALCIUM 7.2*   MG 2.5     CBC:   Recent Labs   Lab 05/19/24 2041 05/20/24  0438   WBC 27.56* 23.49*   HGB 8.8* 8.8*   HCT 28.7* 29.1*    327       Significant Imaging: I have reviewed all pertinent imaging results/findings within the past 24 hours.    Assessment/Plan:      * RIP (acute kidney injury)  Baseline Cr 1.1-1.2 from age-related decline in eGFR, currently 3.6 w/ BUN 87 most likely 2/2 hypovolemia from infxn and poor PO intake. Cont /hr tonight, strict I/O, and repeat Cr in AM. Stop IVF if improved, to avoid vol  overload.    Re-initiated IV fluids for hypotension received 500 cc bolus for hypotension.    Hyperkalemia  Acute, 2/2 RIP, renal diet advised. Shifting Rx given in ED. Cont monitoring on tele.    Acute hypoxic respiratory failure  Evidenced by SpO2 77% on RA on presentation w/ tachypnea, resp sx, and bilat ASD on chest imaging. Currently on 5LPM.    Acute metabolic encephalopathy  Evidenced by disorientation and confusion, per wife, which is acute change from baseline. Most likely 2/2 metabolic effects of RIP, rx effects from gabapentin/opiates      Aspiration pneumonia of both lower lobes  Evidenced by severe leukocytosis, bibasilar ASD on CXR, cough/resp sx, requiring 5LPM o2, and suspected silent aspiration of oral secretions. D/w Dr. Cazares in ED, he reports he has given CTX/Azithro. Will adjust the CTX to renally dosed Unasyn tonight + continue Azithro. Get SLP eval in AM. Trend WBC.    Continue pleasure feedings    Normocytic anemia  Baseline Hgb 8.5-9.4, currently 8.8, 2/2 ACID (anemia of chronic inflam disease) from prostate cancer    BPH without obstruction/lower urinary tract symptoms  D/w RN, retaining 1L in ED, straight cath. Perform q6h bladder scan and if > 500, place garcia tonight. Cont flomax      Malignant neoplasm metastatic to bone  Patient has metastatic cancer of  prostate primary. The cancer has metastasized to  bone. The patient is under the care of an outpatient oncologist. The patient is not undergoing active chemotherapy. .Their staging information is listed below.   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  2. Peritoneal nodularity anterior to liver.  Peritoneal carcinomatosis not excluded.  3. Diffuse osseous metastatic disease.  Cancer Staging   No matching staging information was found for the patient.     Plan for inpatient hospice      Hypertension  Given infxn, hold home Rx tonight.    Prostate cancer  Reported bone mets, c/w  Alk Phos of 340 and urinary outflow obstruction tonight. Cont home gabapentin but at 100mg TID (instead of 300mg TID), given renal fxn. PRN tylenol; hold opiates, given his current encephalopathy, but low threshold to resume in patient who was previously electing hospice care. CM consult placed to resume hospice care on discharge; PT/OT assessments to gauge whether this is approp in the home setting.        VTE Risk Mitigation (From admission, onward)           Ordered     heparin (porcine) injection 5,000 Units  Every 8 hours         05/19/24 2305     IP VTE HIGH RISK PATIENT  Once         05/19/24 2305     Place sequential compression device  Until discontinued         05/19/24 2305                    Discharge Planning   DEMARIO:      Code Status: DNR   Is the patient medically ready for discharge?:     Reason for patient still in hospital (select all that apply): Patient trending condition and Laboratory test                     Tayla Peoples NP  Department of Hospital Medicine   [facility]/Surg

## 2024-05-20 NOTE — PT/OT/SLP EVAL
Speech Language Pathology Evaluation  Bedside Swallow    Patient Name:  Robert Nava   MRN:  [id number]  Admitting Diagnosis: RIP (acute kidney injury)    Recommendations:                 General Recommendations:   Speech Therapy to follow for diet tolerance and upgrade  Diet recommendations:   (Full Liquid Diet), Thin liquids - IDDSI Level 0   Aspiration Precautions:  Do Not Rush, Assistance with meals, and Feed only when awake/alert   General Precautions: Standard,    Communication strategies:  none    Assessment:     Robert Nava is a 89 y.o. male admitted 5/19/24 with a diagnosis of Acute Kidney Injury.  He presents with oral dysphagia 2' generalized weakness, age, and poor endurance/weakness. Aspiration risk and poor intake  is increased by need for assistance with meals. Recommend strict swallow precautions during feeding of a Full Liquid Diet with Thin Liquids. Speech Therapy to follow for diet tolerance and up-grade to Soft bite sized-Level IDDSI 6, Thin Liquid-Level IDDSI 0 as endurance improves.    History:     Past Medical History:   Diagnosis Date    BPH (benign prostatic hyperplasia)     Hyperlipidemia     Hypertension     Prostate cancer     He was diagnosed in 2010.  He never received definitive treatment, but received androgen deprivation.  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  His PSA has risen on Lupron and Xtandi.  He is now being switched to Xgeva.    Prostate cancer metastatic to bone     He does complain of left hip pain, which has been worsening over the last couple of months.  This is consistent with his bone scan and CT findings.  I recommended palliative radiation treatment to the left acetabulum.       Past Surgical History:   Procedure Laterality Date    COLONOSCOPY      aprox 2008    COLONOSCOPY N/A 7/27/2020    Procedure: COLONOSCOPY;  Surgeon: Jersey Adam MD;  Location: [facility];  Service: General;  Laterality: N/A;    TONSILLECTOMY         Social History:  Patient lives with his wife with assistance from daughter and grand daughter (per grand daughter).    Prior Intubation HX:  na    Modified Barium Swallow: na 8/2023 In patient bedside swallow. Rec. Regular Diet - IDDSI Level 7, Thin liquids-IDDSI Level 0      Chest X-Rays: Cardiomegaly with bibasilar edema, posterior layering effusions and subsegmental atelectatic changes.     No pneumothorax.       Prior diet: Soft diet/thin.    Occupation/hobbies/homemaking: Spending time with family. Patient has been on hospice for some time..    Subjective     The pt was asleep lying in bed. Pt was easily aroused. Patient was weak but remained attentive throughout session.    Pain/Comfort:       Respiratory Status: Nasal cannula, flow 4 L/min    Objective:     Oral Musculature Evaluation  Oral Musculature: general weakness  Dentition: edentulous  Secretion Management: adequate  Mucosal Quality: good  Mandibular Strength and Mobility: impaired  Oral Labial Strength and Mobility: impaired coordination, functional seal  Lingual Strength and Mobility: impaired strength, impaired protrusion, functional lateral movement  Buccal Strength and Mobility: impaired  Volitional Cough: perceptually weak  Volitional Swallow: Timely rise and tilt palpated  Voice Prior to PO Intake: Weak  Oral Musculature Comments: Generalized weakness but WFL for soft diet but poor endurance    Bedside Swallow Eval:   Consistencies Assessed:  Thin liquids .  Puree .  Mixed consistencies .  Soft solids Chicken stew and rice, green beans      Oral Phase:   Slow mastication, decreased efficiency for solid/soft solids, WFL for puree  Prolonged mastication    Pharyngeal Phase:   no overt clinical signs/symptoms of aspiration  no overt clinical signs/symptoms of pharyngeal dysphagia    Compensatory Strategies  Slow feeding, respite as needed.    Treatment: Pt/family educated re: results/recs of evaluation, SLP role and POC. Discussed oral efficiency deficits, s/s of  aspiration to look for and need for diet down grade. Receptive to information provided.      Goals:   Multidisciplinary Problems       SLP Goals          Problem: SLP    Goal Priority Disciplines Outcome   SLP Goal     SLP Progressing   Description: 1. The patient will increase endurance to complete a 75% of a meal in 30 minutes or less for the LRD with no s/s of aspiration.  2. Family will be provided with written swallow safety guidelines with education re: strategies to increase oral efficiency and decrease air way compromise.  .goalbox                       Plan:     Patient to be seen:  3 x/week   Plan of Care expires:     Plan of Care reviewed with:  patient, grandchild(charu)   SLP Follow-Up:          Discharge recommendations:      Barriers to Discharge:  Level of Skilled Assistance Needed .    Time Tracking:     SLP Treatment Date:   05/20/24  Speech Start Time:  1307  Speech Stop Time:  1334     Speech Total Time (min):  27 min    Billable Minutes: Eval Swallow and Oral Function 12, Self Care/Home Management Training 15, and Total Time 27    05/20/2024

## 2024-05-20 NOTE — SIGNIFICANT EVENT
"Progress Note  Hospital Medicine    Admit Date: 5/19/2024    SUBJECTIVE:     Follow-up For:  RIP (acute kidney injury)    HPI/Interval history (See H&P for complete P,F,SHx) : patient awake and conversing. On 5 Liters NC. Appears comfortable. Not eating or drinking  Patient hypotensive with SBP 80s status post 1 Liter of NS. Afebrile. 0    Review of Systems: List if applicable  Pain scale: 0/10  Constitutional- Positive Weakness   Resp- Positive for Cough, SOB      OBJECTIVE:     Vital Signs Range (Last 24H):  Temp:  [97.2 °F (36.2 °C)-98.3 °F (36.8 °C)]   Pulse:  [78-98]   Resp:  [14-20]   BP: ()/(48-66)   SpO2:  [77 %-96 %]     I & O (Last 24H):    Intake/Output Summary (Last 24 hours) at 5/20/2024 0738  Last data filed at 5/20/2024 0058  Gross per 24 hour   Intake 2540 ml   Output 1050 ml   Net 1490 ml       Estimated body mass index is 20.92 kg/m² as calculated from the following:    Height as of 9/24/23: 5' 11" (1.803 m).    Weight as of this encounter: 68 kg (150 lb).        Physical Exam:  General- Patient alert and oriented x2   0CV- Regular rate and rhythm, No Murmur/jono/rubs  Resp- Lungs CTA Bilaterally, No increased WOB  Abdomen- Non tender/non-distended, BS normoactive x4 quads, no HSM  Skin- No rashes, lesions, ulcers  Neuro- Strength 5/5 flexors/extensors, DTRs 2+ and symmetric, Intact sensation to light touch grossly        Laboratory/Diagnostic Data:  Reviewed and noted in plan where applicable- Please see chart for full lab data.    Medications:  Medication list was reviewed and changes noted under Assessment/Plan.    ASSESSMENT/PLAN:     Active Problems:    Active Hospital Problems    Diagnosis  POA    *RIP (acute kidney injury) [N17.9]  Unknown    Aspiration pneumonia of both lower lobes [J69.0]  Yes    Acute metabolic encephalopathy [G93.41]  Yes    Acute hypoxic respiratory failure [J96.01]  Yes    Hyperkalemia [E87.5]  Yes    BPH without obstruction/lower urinary tract symptoms [N40.0] "  Yes    Normocytic anemia [D64.9]  Yes    Prostate cancer [C61]  Yes    Hypertension [I10]  Yes      Resolved Hospital Problems   No resolved problems to display.         Disposition-   continue IV antibiotics.   End of care.  Wife states she will be at bedside  within the hour.  Will discuss inpatient hospice and comfort care.    Wife wishes to initiate vasopressors to maintain blood pressure at this time however will discuss upon arrival.  Fluid bolus 500 cc given.  Patient noted to have improvement of blood pressure systolic 90   Discussed with patient current status.  He wishes to remain comfortable    Advance Care Planning     Date: 05/20/2024    [facility]  I engaged the patient and family in a voluntary conversation about advance care planning and we specifically addressed what the goals of care would be moving forward, in light of the patient's change in clinical status, specifically  metastatic cancer, anorexia and chronic comorbidities.  We did specifically address the patient's likely prognosis, which is poor.  We explored the patient's values and preferences for future care.  The family endorses that what is most important right now is to focus on symptom/pain control and comfort and QOL     Accordingly, we have decided that the best plan to meet the patient's goals includes pivot to comfort-focused care    I did explain the role for hospice care at this stage of the patient's illness, including its ability to help the patient live with the best quality of life possible.  We will be making a hospice referral.    I spent a total of 30 minutes engaging the patient in this advance care planning discussion.              DVT prophylaxis addressed with: Lovenox    Time spent in care of patient (Greater than 1/2 spent in direct face to face contact): 60    Time spent in critical care (in addition to E/M time mentioned above) Time spent to titrate drips, adjust ventilator settings, and provide counseling and  coordination to critical care team- 60 min    See progress note for continued discussions

## 2024-05-20 NOTE — HPI
"89M p/w general malaise characterized as "feeling badly" w/ associated anorexia resulting in poor PO intake of solids and liquids for several days. His wife (Radha Nava) who was present during our encounter endorses that he has also had non-productive, "wet" sounding cough, reduced mobility, and visible respiratory discomfort. He has reportedly otherwise been taking all of his prescribed medications, and he has reportedly not consumed alcohol since moving in with Radha in [place], [place] back in October 2023. Patient had been enrolled with [facility] here in [place].  "

## 2024-05-20 NOTE — ASSESSMENT & PLAN NOTE
Baseline Cr 1.1-1.2 from age-related decline in eGFR, currently 3.6 w/ BUN 87 most likely 2/2 hypovolemia from infxn and poor PO intake. Cont /hr tonight, strict I/O, and repeat Cr in AM. Stop IVF if improved, to avoid vol overload.    Re-initiated IV fluids for hypotension received 500 cc bolus for hypotension.

## 2024-05-20 NOTE — SUBJECTIVE & OBJECTIVE
Past Medical History:   Diagnosis Date    BPH (benign prostatic hyperplasia)     Hyperlipidemia     Hypertension     Prostate cancer     He was diagnosed in 2010.  He never received definitive treatment, but received androgen deprivation.  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  His PSA has risen on Lupron and Xtandi.  He is now being switched to Xgeva.    Prostate cancer metastatic to bone     He does complain of left hip pain, which has been worsening over the last couple of months.  This is consistent with his bone scan and CT findings.  I recommended palliative radiation treatment to the left acetabulum.       Past Surgical History:   Procedure Laterality Date    COLONOSCOPY      aprox 2008    COLONOSCOPY N/A 7/27/2020    Procedure: COLONOSCOPY;  Surgeon: Jersey Adam MD;  Location: [facility];  Service: General;  Laterality: N/A;    TONSILLECTOMY         Review of patient's allergies indicates:  No Known Allergies    No current facility-administered medications on file prior to encounter.     Current Outpatient Medications on File Prior to Encounter   Medication Sig    amLODIPine (NORVASC) 10 MG tablet Take 1 tablet (10 mg total) by mouth once daily.    bisacodyL (DULCOLAX) 10 mg Supp Place 1 suppository (10 mg total) rectally daily as needed (for constipation).    calcium-vitamin D 600 mg-10 mcg (400 unit) Tab 1 tab, Oral, BID, # 180 tab, 3 Refill(s), Pharmacy: [place], [place] MAIL ONLY, 180.34, cm, 11/04/22 8:23:00 CDT, Height/Length Measured, 87.1, kg, 11/04/22 8:23:00 CDT, Weight Dosing    docusate sodium (COLACE) 100 MG capsule Take 1 capsule (100 mg total) by mouth 2 (two) times daily. (Patient not taking: Reported on 10/10/2023)    gabapentin (NEURONTIN) 300 MG capsule Take 1 capsule (300 mg total) by mouth 3 (three) times daily.    ondansetron (ZOFRAN-ODT) 4 MG TbDL Take 4 mg by mouth every 8 (eight) hours as needed.    oxyCODONE-acetaminophen (PERCOCET)  mg per tablet  Take 1 tablet by mouth every 4 (four) hours as needed.    predniSONE (DELTASONE) 5 MG tablet Take 5 mg by mouth 2 (two) times daily.    simvastatin (ZOCOR) 40 MG tablet Take 1 tablet (40 mg total) by mouth every evening.    tamsulosin (FLOMAX) 0.4 mg Cap Take 1 capsule (0.4 mg total) by mouth every evening.     Family History       Problem Relation (Age of Onset)    Cancer Brother    Heart disease Daughter          Tobacco Use    Smoking status: Former     Current packs/day: 0.00     Types: Cigarettes     Quit date:      Years since quittin.4    Smokeless tobacco: Current     Types: Chew   Substance and Sexual Activity    Alcohol use: Yes     Alcohol/week: 1.0 standard drink of alcohol     Types: 1 Shots of liquor per week     Comment: daily    Drug use: Never    Sexual activity: Not Currently     Review of Systems   Constitutional:  Positive for appetite change and fatigue. Negative for fever.   Respiratory:  Positive for cough and shortness of breath.    Gastrointestinal:  Positive for constipation. Negative for diarrhea and vomiting.   Neurological:  Positive for weakness. Negative for seizures and headaches.   Psychiatric/Behavioral:  Positive for confusion and decreased concentration. Negative for agitation. The patient is not hyperactive.      Objective:     Vital Signs (Most Recent):  Temp: 98.3 °F (36.8 °C) (24)  Pulse: 94 (24)  Resp: 18 (24)  BP: (!) 115/53 (24)  SpO2: (!) 93 % (24) Vital Signs (24h Range):  Temp:  [98.3 °F (36.8 °C)] 98.3 °F (36.8 °C)  Pulse:  [93-96] 94  Resp:  [18-20] 18  SpO2:  [77 %-93 %] 93 %  BP: ()/(51-54) 115/53     Weight: 68 kg (150 lb)  Body mass index is 20.92 kg/m².     Physical Exam  Vitals and nursing note reviewed. Exam conducted with a chaperone present.   Constitutional:       General: He is not in acute distress.     Appearance: He is ill-appearing. He is not toxic-appearing or diaphoretic.    Cardiovascular:      Rate and Rhythm: Normal rate and regular rhythm.      Heart sounds: No murmur heard.     Comments: Distant heart sounds  Pulmonary:      Effort: Pulmonary effort is normal.      Breath sounds: Rhonchi (bilat lateral segs) present. No wheezing.   Abdominal:      General: Bowel sounds are normal. There is no distension.      Palpations: Abdomen is soft.      Tenderness: There is no abdominal tenderness.   Musculoskeletal:      Right lower leg: No edema.      Left lower leg: No edema.      Comments: Diffuse muscle wasting, temporal muscles reduced, cachexia, pony prominence over forehead as well as L clavicle   Skin:     Findings: Bruising present.   Neurological:      Mental Status: He is alert.      Comments: AAOx1 (person only)   Psychiatric:      Comments: Follows one-step commands                Significant Labs: All pertinent labs within the past 24 hours have been reviewed.  CBC:   Recent Labs   Lab 05/19/24 2041   WBC 27.56*   HGB 8.8*   HCT 28.7*        CMP:   Recent Labs   Lab 05/19/24 2041      K 6.3*      CO2 20*   GLU 90   BUN 87*   CREATININE 3.6*   CALCIUM 7.3*   PROT 5.6*   ALBUMIN 2.4*   BILITOT 2.4*   ALKPHOS 339*   AST 29   ALT 10   ANIONGAP 13       Significant Imaging: I have reviewed all pertinent imaging results/findings within the past 24 hours.  CXR: I have reviewed all pertinent results/findings within the past 24 hours and my personal findings are:  bibasilar air space disease

## 2024-05-20 NOTE — ASSESSMENT & PLAN NOTE
Reported bone mets, c/w Alk Phos of 340 and urinary outflow obstruction tonight. Cont home gabapentin but at 100mg TID (instead of 300mg TID), given renal fxn. PRN tylenol; hold opiates, given his current encephalopathy, but low threshold to resume in patient who was previously electing hospice care. CM consult placed to resume hospice care on discharge; PT/OT assessments to gauge whether this is approp in the home setting.

## 2024-05-20 NOTE — PLAN OF CARE
Formerly [facility] - Med/Surg  Initial Discharge Assessment       Primary Care Provider: Carlie Primary Doctor    Admission Diagnosis: RIP (acute kidney injury) [N17.9]    Admission Date: 5/19/2024  Expected Discharge Date:     Transition of Care Barriers: Social    Payor: HU[employer] MANAGED MEDICARE / Plan: HU[employer] MEDICARE HMO / Product Type: Capitation /     Extended Emergency Contact Information  Primary Emergency Contact: Radha Nava  Mobile Phone: [phone]  Relation: Spouse  Secondary Emergency Contact: Funmilayo Navarro  Mobile Phone: [phone]  Relation: Daughter   needed? No    Discharge Plan A: Hospice/home  Discharge Plan B: Hospice/home      [facility] - [place], [place] - [address]  [address]  [place] [address]  Phone: [phone] Fax: [phone]    [facility] - [facility] [address]  [address] [address]  Phone: [phone] Fax: [phone]    [id number] - [place], [place] - [address]  [address]  [place] [address]  Phone: [phone] Fax: [phone]    DC assessment completed with spouse at bedside. Verified information on facesheet as correct. Pt lives at listed address with spouse. Reports he has help if needed from family. Denies CHEMO PEÑA (spouse).  Pharmacy is [facility]. Denies hh/hd/outpt services. Reports needing assistance with activities. Reports will need assistance with transportation home upon DC. Reports taking home medications as prescribed and can currently afford them. Verified insurance on file. Denies recent inpt stay in last 30 days.  DC plan is Hospice.    Initial Assessment (most recent)       Adult Discharge Assessment - 05/20/24 1236          Discharge Assessment    Assessment Type Discharge Planning Assessment     Confirmed/corrected address, phone number and insurance Yes     Confirmed Demographics Correct on Facesheet      Source of Information family     People in Home spouse     Do you expect to return to your current living situation? Yes     Do you have help at home or someone to help you manage your care at home? Yes     Who are your caregiver(s) and their phone number(s)? [facility]     Prior to hospitilization cognitive status: Alert/Oriented     Current cognitive status: Alert/Oriented     Walking or Climbing Stairs Difficulty yes     Dressing/Bathing Difficulty yes     Readmission within 30 days? No     Patient currently being followed by outpatient case management? No     Do you currently have service(s) that help you manage your care at home? Yes     Name and Contact number of agency [facility]     Is the pt/caregiver preference to resume services with current agency Yes     Do you take prescription medications? Yes     Do you have prescription coverage? Yes     Do you have any problems affording any of your prescribed medications? No     Is the patient taking medications as prescribed? yes     Who is going to help you get home at discharge? TBD     How do you get to doctors appointments? family or friend will provide     Are you on dialysis? No     Do you take coumadin? No     Discharge Plan A Hospice/home     Discharge Plan B Hospice/home     DME Needed Upon Discharge  none     Discharge Plan discussed with: Spouse/sig other     Transition of Care Barriers Social

## 2024-05-20 NOTE — ED PROVIDER NOTES
Encounter Date: 2024       History     Chief Complaint   Patient presents with    Altered Mental Status     Arrived via EMS from home. Per ems, family states that pt is hospice but full code. Pt has been refusing to eat or drink for last week and has had some intermittent confusion. Pt was also 70s RA sats.     Cough     Wet cough     Chief complaint is decreased appetite for several days.  Patient found to be hypoxic 70% pulse ox by EMS improved with nasal cannula.  He has bone cancer is bed ridden and was recently seen here for constipation have several days ago.        Review of patient's allergies indicates:  No Known Allergies  Past Medical History:   Diagnosis Date    BPH (benign prostatic hyperplasia)     Hyperlipidemia     Hypertension     Prostate cancer     He was diagnosed in .  He never received definitive treatment, but received androgen deprivation.  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  His PSA has risen on Lupron and Xtandi.  He is now being switched to Xgeva.    Prostate cancer metastatic to bone     He does complain of left hip pain, which has been worsening over the last couple of months.  This is consistent with his bone scan and CT findings.  I recommended palliative radiation treatment to the left acetabulum.     Past Surgical History:   Procedure Laterality Date    COLONOSCOPY      aprox     COLONOSCOPY N/A 2020    Procedure: COLONOSCOPY;  Surgeon: Jersey Adam MD;  Location: [facility];  Service: General;  Laterality: N/A;    TONSILLECTOMY       Family History   Problem Relation Name Age of Onset    Cancer Brother      Heart disease Daughter       Social History     Tobacco Use    Smoking status: Former     Current packs/day: 0.00     Types: Cigarettes     Quit date: 1972     Years since quittin.4    Smokeless tobacco: Current     Types: Chew   Substance Use Topics    Alcohol use: Yes     Alcohol/week: 1.0 standard drink of alcohol     Types: 1 Shots of  liquor per week     Comment: daily    Drug use: Never     Review of Systems   Constitutional:  Negative for chills and fever.   HENT:  Negative for ear pain, rhinorrhea and sore throat.    Eyes:  Negative for pain and visual disturbance.   Respiratory:  Positive for shortness of breath. Negative for cough.    Cardiovascular:  Negative for chest pain and palpitations.   Gastrointestinal:  Positive for abdominal pain. Negative for constipation, diarrhea, nausea and vomiting.   Genitourinary:  Negative for dysuria, frequency, hematuria and urgency.   Musculoskeletal:  Negative for back pain, joint swelling and myalgias.   Skin:  Negative for rash.   Neurological:  Negative for dizziness, seizures, weakness and headaches.   Psychiatric/Behavioral:  Negative for dysphoric mood. The patient is not nervous/anxious.        Physical Exam     Initial Vitals   BP Pulse Resp Temp SpO2   05/19/24 2031 05/19/24 2031 05/19/24 2031 05/19/24 2033 05/19/24 2031   (!) 97/54 96 20 98.3 °F (36.8 °C) (!) 77 %      MAP       --                Physical Exam    Nursing note and vitals reviewed.  Constitutional:   Patient frail cachectic   HENT:   Head: Normocephalic and atraumatic.   Patient mouth dry tongue    Eyes: Conjunctivae, EOM and lids are normal. Pupils are equal, round, and reactive to light.   Neck: Trachea normal. Neck supple. No thyroid mass present.   Cardiovascular:  Normal rate, regular rhythm and normal heart sounds.           Pulmonary/Chest: No respiratory distress.   Coarse breath sounds in bases.   Abdominal: Abdomen is soft. There is no abdominal tenderness.   Patient with tenderness in lower quadrants.   Musculoskeletal:         General: Normal range of motion.      Cervical back: Neck supple.     Neurological: He is alert and oriented to person, place, and time. He has normal strength and normal reflexes. No cranial nerve deficit or sensory deficit.   Skin: Skin is warm and dry.   Patient with pressure sore right  upper back sacral decubitus stage II   Psychiatric: He has a normal mood and affect. His speech is normal and behavior is normal. Judgment and thought content normal.         ED Course   Procedures  Labs Reviewed   CULTURE, BLOOD   CULTURE, BLOOD   TROPONIN I HIGH SENSITIVITY   CBC W/ AUTO DIFFERENTIAL   COMPREHENSIVE METABOLIC PANEL   MAGNESIUM   PHOSPHORUS   URINALYSIS, REFLEX TO URINE CULTURE   B-TYPE NATRIURETIC PEPTIDE   LACTIC ACID, PLASMA          Imaging Results    None          Medications - No data to display  Medical Decision Making  Amount and/or Complexity of Data Reviewed  Labs: ordered.  Radiology: ordered.    Risk  OTC drugs.  Prescription drug management.              Attending Attestation:         Attending Critical Care:   Critical Care Times:   Direct Patient Care (initial evaluation, reassessments, and time considering the case)................................................................10 minutes.   Additional History from reviewing old medical records or taking additional history from the family, EMS, PCP, etc.......................5 minutes.   Ordering, Reviewing, and Interpreting Diagnostic Studies...............................................................................................................10 minutes.   Documentation..................................................................................................................................................................................5 minutes.   Consultation with other Physicians. .................................................................................................................................................5 minutes.   ==============================================================  Total Critical Care Time - exclusive of procedural time: 35 minutes.  ==============================================================  Critical care was necessary to treat or prevent imminent or life-threatening  deterioration of the following conditions: dehydration.   The following critical care procedures were done by me (see procedure notes): pulse oximetry.   Critical care was time spent personally by me on the following activities: examination of patient, obtaining history from patient or relative, review of old charts, ordering lab, x-rays, and/or EKG, ordering and performing treatments and interventions and discussion with consultants.   Critical Care Condition: critical                                  Clinical Impression:  Final diagnoses:  [R53.1] Weakness                 Wanda Cazares MD  05/19/24 6235

## 2024-05-20 NOTE — ASSESSMENT & PLAN NOTE
Baseline Hgb 8.5-9.4, currently 8.8, 2/2 ACID (anemia of chronic inflam disease) from prostate cancer

## 2024-05-20 NOTE — PLAN OF CARE
Problem: SLP  Goal: SLP Goal  Description: 1. The patient will increase endurance to complete a 75% of a meal in 30 minutes or less for the LRD with no s/s of aspiration.  2. Family will be provided with written swallow safety guidelines with education re: strategies to increase oral efficiency and decrease air way compromise.    5/20/2024 1709 by Ally Herman CCC-SLP  Outcome: Progressing   Bedside swallow completed. Oral dysphagia due to age, being edentulous, and poor endurance/weakness. Patient with poor appetite but motivated to eat with encouragement. Rec Full Liquid Diet with upgrade to Soft bite sized-Level IDDSI 6, Thin Liquid-Level IDDSI 0 quickly if endurance improves.

## 2024-05-20 NOTE — PLAN OF CARE
1000 Referral sent through [facility] to [facility] for assessment for Inpt Hospice.    1121 Met with Aundrea ([facility]).  Pt does not meet criteria for Inpt Hospice.  Provider notified.      1210 Referral sent to [facility] for review.  Attempted to contact Alvaro with [facility], no answer, left voice mail.    1418 Alvaro with [facility] stopped by office.  States saw pt, doesn't meet criteria for Inpt Hospice with them.  States will contact [facility] to see if there are any available rooms.      1445 Went to speak with pt and spouse regarding options.  Provider at bedside.  Nelida at [facility] called to talk about pt.  States pt not eligible for Inpt Hospice but there are other options that they can discuss with pt and family.  Informed them pt and family didn't want to go home and was looking at other options, possibly Hospice House.  She will call me tomorrow to discuss other options.    1558 Received message pt and spouse don't want Hospice House, would like pt to go home.  Spoke with pt and spouse at bedside.  Explained that medically could keep pt overnight to continue fluids but then most likely medically cleared tomorrow and would need another place for pt to go as medically we wouldn't have a reason to keep him in the hospital.  Explained options at this time are Hospice House, if they have availability or home with hospice.  Explained that there are options for hospice and they would need to discuss those with [facility].  Spouse and pt expressed understanding.  Spouse states will call them now.  Discussed the above with RAMEZ Keene.         05/20/24 1000   Post-Acute Status   Post-Acute Authorization Hospice   Hospice Status Referrals Sent

## 2024-05-21 PROBLEM — D69.2 OTHER NONTHROMBOCYTOPENIC PURPURA: Status: ACTIVE | Noted: 2024-05-21

## 2024-05-21 PROBLEM — C64.1 MALIGNANT NEOPLASM OF RIGHT KIDNEY, EXCEPT RENAL PELVIS: Status: ACTIVE | Noted: 2024-05-21

## 2024-05-21 PROBLEM — E87.5 HYPERKALEMIA: Status: RESOLVED | Noted: 2024-05-19 | Resolved: 2024-05-21

## 2024-05-21 PROBLEM — C40.22: Status: ACTIVE | Noted: 2024-05-21

## 2024-05-21 PROBLEM — F10.929 ACUTE ALCOHOLIC INTOXICATION WITH COMPLICATION: Status: RESOLVED | Noted: 2023-08-23 | Resolved: 2024-05-21

## 2024-05-21 PROBLEM — R55 SYNCOPE AND COLLAPSE: Status: RESOLVED | Noted: 2023-08-23 | Resolved: 2024-05-21

## 2024-05-21 PROBLEM — H54.7 VISUAL IMPAIRMENT: Status: ACTIVE | Noted: 2024-05-21

## 2024-05-21 PROBLEM — M19.90 OSTEOARTHRITIS: Status: ACTIVE | Noted: 2024-05-21

## 2024-05-21 PROBLEM — J00 ACUTE RHINITIS: Status: RESOLVED | Noted: 2021-04-22 | Resolved: 2024-05-21

## 2024-05-21 LAB
ACINETOBACTER CALCOACETICUS/BAUMANNII COMPLEX: NOT DETECTED
ALBUMIN SERPL BCP-MCNC: 1.4 G/DL (ref 3.5–5.2)
ALP SERPL-CCNC: 319 U/L (ref 55–135)
ALT SERPL W/O P-5'-P-CCNC: 15 U/L (ref 10–44)
ANION GAP SERPL CALC-SCNC: 11 MMOL/L (ref 8–16)
AST SERPL-CCNC: 34 U/L (ref 10–40)
BACTEROIDES FRAGILIS: NOT DETECTED
BASOPHILS # BLD AUTO: 0.06 K/UL (ref 0–0.2)
BASOPHILS NFR BLD: 0.3 % (ref 0–1.9)
BILIRUB SERPL-MCNC: 1.8 MG/DL (ref 0.1–1)
BUN SERPL-MCNC: 68 MG/DL (ref 10–30)
CALCIUM SERPL-MCNC: 7.7 MG/DL (ref 8.7–10.5)
CANDIDA ALBICANS: NOT DETECTED
CANDIDA AURIS: NOT DETECTED
CANDIDA GLABRATA: NOT DETECTED
CANDIDA KRUSEI: NOT DETECTED
CANDIDA PARAPSILOSIS: NOT DETECTED
CANDIDA TROPICALIS: NOT DETECTED
CHLORIDE SERPL-SCNC: 110 MMOL/L (ref 95–110)
CO2 SERPL-SCNC: 19 MMOL/L (ref 23–29)
CREAT SERPL-MCNC: 2.4 MG/DL (ref 0.5–1.4)
CRYPTOCOCCUS NEOFORMANS/GATTII: NOT DETECTED
CTX-M GENE (ESBL PRODUCER): ABNORMAL
DIFFERENTIAL METHOD BLD: ABNORMAL
ENTEROBACTER CLOACAE COMPLEX: NOT DETECTED
ENTEROBACTERALES: NOT DETECTED
ENTEROCOCCUS FAECALIS: NOT DETECTED
ENTEROCOCCUS FAECIUM: NOT DETECTED
EOSINOPHIL # BLD AUTO: 0 K/UL (ref 0–0.5)
EOSINOPHIL NFR BLD: 0.1 % (ref 0–8)
ERYTHROCYTE [DISTWIDTH] IN BLOOD BY AUTOMATED COUNT: 23.1 % (ref 11.5–14.5)
ESCHERICHIA COLI: NOT DETECTED
EST. GFR  (NO RACE VARIABLE): 25 ML/MIN/1.73 M^2
GLUCOSE SERPL-MCNC: 77 MG/DL (ref 70–110)
HAEMOPHILUS INFLUENZAE: NOT DETECTED
HCT VFR BLD AUTO: 28.7 % (ref 40–54)
HGB BLD-MCNC: 8.5 G/DL (ref 14–18)
IMM GRANULOCYTES # BLD AUTO: 0.93 K/UL (ref 0–0.04)
IMM GRANULOCYTES NFR BLD AUTO: 4.4 % (ref 0–0.5)
IMP GENE (CARBAPENEM RESISTANT): ABNORMAL
KLEBSIELLA AEROGENES: NOT DETECTED
KLEBSIELLA OXYTOCA: NOT DETECTED
KLEBSIELLA PNEUMONIAE GROUP: NOT DETECTED
KPC RESISTANCE GENE (CARBAPENEM): ABNORMAL
LISTERIA MONOCYTOGENES: NOT DETECTED
LYMPHOCYTES # BLD AUTO: 1.1 K/UL (ref 1–4.8)
LYMPHOCYTES NFR BLD: 5 % (ref 18–48)
MAGNESIUM SERPL-MCNC: 2.5 MG/DL (ref 1.6–2.6)
MCH RBC QN AUTO: 24.6 PG (ref 27–31)
MCHC RBC AUTO-ENTMCNC: 29.6 G/DL (ref 32–36)
MCR-1: ABNORMAL
MCV RBC AUTO: 83 FL (ref 82–98)
MEC A/C AND MREJ (MRSA): ABNORMAL
MEC A/C: ABNORMAL
MONOCYTES # BLD AUTO: 0.9 K/UL (ref 0.3–1)
MONOCYTES NFR BLD: 4 % (ref 4–15)
NDM GENE (CARBAPENEM RESISTANT): ABNORMAL
NEISSERIA MENINGITIDIS: NOT DETECTED
NEUTROPHILS # BLD AUTO: 18.4 K/UL (ref 1.8–7.7)
NEUTROPHILS NFR BLD: 86.2 % (ref 38–73)
NRBC BLD-RTO: 0 /100 WBC
OXA-48-LIKE (CARBAPENEM RESISTANT): ABNORMAL
PLATELET # BLD AUTO: 360 K/UL (ref 150–450)
PMV BLD AUTO: 10.2 FL (ref 9.2–12.9)
POCT GLUCOSE: 82 MG/DL (ref 70–110)
POCT GLUCOSE: 98 MG/DL (ref 70–110)
POTASSIUM SERPL-SCNC: 4.8 MMOL/L (ref 3.5–5.1)
PROT SERPL-MCNC: 5.1 G/DL (ref 6–8.4)
PROTEUS SPECIES: NOT DETECTED
PSEUDOMONAS AERUGINOSA: NOT DETECTED
RBC # BLD AUTO: 3.46 M/UL (ref 4.6–6.2)
SALMONELLA SP: NOT DETECTED
SERRATIA MARCESCENS: NOT DETECTED
SODIUM SERPL-SCNC: 140 MMOL/L (ref 136–145)
STAPHYLOCOCCUS AUREUS: NOT DETECTED
STAPHYLOCOCCUS EPIDERMIDIS: NOT DETECTED
STAPHYLOCOCCUS LUGDUNESIS: NOT DETECTED
STAPHYLOCOCCUS SPECIES: DETECTED
STENOTROPHOMONAS MALTOPHILIA: NOT DETECTED
STREPTOCOCCUS AGALACTIAE: NOT DETECTED
STREPTOCOCCUS PNEUMONIAE: NOT DETECTED
STREPTOCOCCUS PYOGENES: NOT DETECTED
STREPTOCOCCUS SPECIES: NOT DETECTED
VAN A/B (VRE GENE): ABNORMAL
VIM GENE (CARBAPENEM RESISTANT): ABNORMAL
WBC # BLD AUTO: 21.3 K/UL (ref 3.9–12.7)

## 2024-05-21 PROCEDURE — 80053 COMPREHEN METABOLIC PANEL: CPT | Performed by: HOSPITALIST

## 2024-05-21 PROCEDURE — 51798 US URINE CAPACITY MEASURE: CPT

## 2024-05-21 PROCEDURE — 51702 INSERT TEMP BLADDER CATH: CPT

## 2024-05-21 PROCEDURE — 83735 ASSAY OF MAGNESIUM: CPT | Performed by: HOSPITALIST

## 2024-05-21 PROCEDURE — 25000003 PHARM REV CODE 250: Performed by: NURSE PRACTITIONER

## 2024-05-21 PROCEDURE — 36415 COLL VENOUS BLD VENIPUNCTURE: CPT | Performed by: HOSPITALIST

## 2024-05-21 PROCEDURE — 99900031 HC PATIENT EDUCATION (STAT)

## 2024-05-21 PROCEDURE — 25000003 PHARM REV CODE 250: Performed by: STUDENT IN AN ORGANIZED HEALTH CARE EDUCATION/TRAINING PROGRAM

## 2024-05-21 PROCEDURE — 27000221 HC OXYGEN, UP TO 24 HOURS

## 2024-05-21 PROCEDURE — 63600175 PHARM REV CODE 636 W HCPCS: Performed by: HOSPITALIST

## 2024-05-21 PROCEDURE — 25000003 PHARM REV CODE 250: Performed by: HOSPITALIST

## 2024-05-21 PROCEDURE — 94761 N-INVAS EAR/PLS OXIMETRY MLT: CPT

## 2024-05-21 PROCEDURE — 11000001 HC ACUTE MED/SURG PRIVATE ROOM

## 2024-05-21 PROCEDURE — 85025 COMPLETE CBC W/AUTO DIFF WBC: CPT | Performed by: HOSPITALIST

## 2024-05-21 RX ADMIN — ATORVASTATIN CALCIUM 10 MG: 10 TABLET, FILM COATED ORAL at 08:05

## 2024-05-21 RX ADMIN — GABAPENTIN 100 MG: 100 CAPSULE ORAL at 08:05

## 2024-05-21 RX ADMIN — SENNOSIDES AND DOCUSATE SODIUM 1 TABLET: 50; 8.6 TABLET ORAL at 08:05

## 2024-05-21 RX ADMIN — SODIUM CHLORIDE: 9 INJECTION, SOLUTION INTRAVENOUS at 06:05

## 2024-05-21 RX ADMIN — HEPARIN SODIUM 5000 UNITS: 5000 INJECTION, SOLUTION INTRAVENOUS; SUBCUTANEOUS at 09:05

## 2024-05-21 RX ADMIN — SODIUM CHLORIDE: 9 INJECTION, SOLUTION INTRAVENOUS at 08:05

## 2024-05-21 RX ADMIN — OXYCODONE HYDROCHLORIDE AND ACETAMINOPHEN 1 TABLET: 10; 325 TABLET ORAL at 04:05

## 2024-05-21 RX ADMIN — HEPARIN SODIUM 5000 UNITS: 5000 INJECTION, SOLUTION INTRAVENOUS; SUBCUTANEOUS at 02:05

## 2024-05-21 RX ADMIN — TAMSULOSIN HYDROCHLORIDE 0.4 MG: 0.4 CAPSULE ORAL at 08:05

## 2024-05-21 RX ADMIN — POLYETHYLENE GLYCOL 3350 17 G: 17 POWDER, FOR SOLUTION ORAL at 08:05

## 2024-05-21 RX ADMIN — AMPICILLIN SODIUM AND SULBACTAM SODIUM 1.5 G: 1; .5 INJECTION, POWDER, FOR SOLUTION INTRAMUSCULAR; INTRAVENOUS at 12:05

## 2024-05-21 RX ADMIN — HEPARIN SODIUM 5000 UNITS: 5000 INJECTION, SOLUTION INTRAVENOUS; SUBCUTANEOUS at 05:05

## 2024-05-21 RX ADMIN — PREDNISONE 5 MG: 5 TABLET ORAL at 08:05

## 2024-05-21 RX ADMIN — GABAPENTIN 100 MG: 100 CAPSULE ORAL at 02:05

## 2024-05-21 NOTE — PLAN OF CARE
Per Norman with [facility] [phone] who stated they can accept patient back home with hospice but patient does not qualify for inpatient hospice.  Per Norman, patient's spouse in agreement to take patient home with hospice then 5 days respite.  Per Norman, patient's spouse can not accept patient today due to brother in emergency room at this time.       05/21/24 1243   Post-Acute Status   Post-Acute Authorization Hospice   Discharge Delays (!) Patient and Family Barriers

## 2024-05-21 NOTE — PLAN OF CARE
Problem: Adult Inpatient Plan of Care  Goal: Plan of Care Review  Outcome: Progressing     Problem: Adult Inpatient Plan of Care  Goal: Optimal Comfort and Wellbeing  Outcome: Progressing     Pt rested in bed this shift. No complaints of pain or discomfort voiced, NAD noted. Poor appetite this shift, food and fluids encouraged. 4L O2 in place. Reynoso placed for urinary retention in place and draining. IVF infusing as ordered. Safety maintained. Needs attended to.

## 2024-05-21 NOTE — ASSESSMENT & PLAN NOTE
Nutrition consulted. Most recent weight and BMI monitored-     Measurements:  Wt Readings from Last 1 Encounters:   05/20/24 68 kg (149 lb 14.6 oz)   Body mass index is 20.91 kg/m².    Patient has been screened and assessed by RD.    Malnutrition Type:  Context: chronic illness  Level: severe    Malnutrition Characteristic Summary:  Weight Loss (Malnutrition): greater than 10% in 6 months  Energy Intake (Malnutrition): less than 75% for greater than or equal to 1 month  Subcutaneous Fat (Malnutrition): severe depletion  Muscle Mass (Malnutrition): severe depletion  Fluid Accumulation (Malnutrition): mild    Interventions/Recommendations (treatment strategy):  Hospice

## 2024-05-21 NOTE — HOSPITAL COURSE
Robert Nava is a 89 year old male with a past medical history of metastatic prostate cancer on hospice, HTN, HLD, and anemia who presented with acute hypoxic respiratory failure in the setting of aspiration pneumonia with RIP. He is stable on 4 L NC and Unasyn. His kidney function is improved with IV fluids. He was discharged home with home hospice 5/22.

## 2024-05-21 NOTE — CARE UPDATE
05/21/24 0731   Patient Assessment/Suction   Level of Consciousness (AVPU) alert   Respiratory Effort Unlabored;Normal   Expansion/Accessory Muscles/Retractions no use of accessory muscles;no retractions;expansion symmetric   All Lung Fields Breath Sounds Anterior:;Lateral:;coarse   Rhythm/Pattern, Respiratory unlabored   Cough Frequency no cough   PRE-TX-O2   Device (Oxygen Therapy) nasal cannula with humidification   $ Is the patient on Low Flow Oxygen? Yes   Flow (L/min) (Oxygen Therapy) 4  (home use)   SpO2 (!) 92 %   Pulse Oximetry Type Intermittent   $ Pulse Oximetry - Multiple Charge Pulse Oximetry - Multiple   Pulse 80   Resp 18   Positioning HOB elevated 30 degrees   Education   $ Education 15 min

## 2024-05-21 NOTE — NURSING
Pt noted to have only 1 wet brief this shift. Bladder scan performed. 999 mL max shown on bladder scan. Dr. Ledezma notified. Orders to place garcia catheter received. 16 Fr garcia catheter placed with no difficulty. Pt tolerated well. 1100 mL dark azucena urine immediate return after placement. MD notified.

## 2024-05-21 NOTE — CONSULTS
Wound care consulted for multiple pressure wounds present on admit. Rounded with Dr. Moraes at the bedside for full skin assessment.   Left lateral foot DTI  Right heel DTI  Right lateral heel DTI  Right lateral ankle DTI  Right hip DTI  Right thigh DTI  Midback spine DTI  BL shoulder blades DTI's  Sacral DTI with unstagable pressure ulcer      Back view    Right hip DTI    Right lateral heel DTI    Left heel     Sacrum unstageable    Right lateral back    Right anterior knee    Applied bilateral quilted offloading heel protectors. Patient positioned with pillows. Wound care will follow up throughout hospital stay.

## 2024-05-21 NOTE — SUBJECTIVE & OBJECTIVE
"Interval History: see "Hospital Course"    Review of Systems   Constitutional:  Positive for activity change, appetite change and fatigue.   Respiratory:  Positive for shortness of breath.    Neurological:  Positive for weakness.     Objective:     Vital Signs (Most Recent):  Temp: 97.5 °F (36.4 °C) (05/21/24 0755)  Pulse: 80 (05/21/24 0755)  Resp: 15 (05/21/24 0755)  BP: (!) 91/54 (05/21/24 0755)  SpO2: (!) 94 % (05/21/24 0755) Vital Signs (24h Range):  Temp:  [97 °F (36.1 °C)-97.6 °F (36.4 °C)] 97.5 °F (36.4 °C)  Pulse:  [61-84] 80  Resp:  [15-21] 15  SpO2:  [92 %-96 %] 94 %  BP: ()/(50-56) 91/54     Weight: 68 kg (149 lb 14.6 oz)  Body mass index is 20.91 kg/m².    Intake/Output Summary (Last 24 hours) at 5/21/2024 1030  Last data filed at 5/21/2024 0625  Gross per 24 hour   Intake 4658.44 ml   Output 50 ml   Net 4608.44 ml         Physical Exam  Vitals and nursing note reviewed.   Constitutional:       Appearance: He is ill-appearing.   HENT:      Head: Normocephalic and atraumatic.      Right Ear: External ear normal.      Left Ear: External ear normal.      Nose: Nose normal.      Mouth/Throat:      Mouth: Mucous membranes are moist.      Pharynx: Oropharynx is clear.   Eyes:      Extraocular Movements: Extraocular movements intact.      Conjunctiva/sclera: Conjunctivae normal.   Cardiovascular:      Rate and Rhythm: Normal rate and regular rhythm.      Pulses: Normal pulses.      Heart sounds: Normal heart sounds.   Pulmonary:      Breath sounds: Rhonchi present.      Comments: NC.  Abdominal:      General: Bowel sounds are normal.      Palpations: Abdomen is soft.   Musculoskeletal:      Cervical back: Normal range of motion and neck supple.      Right lower leg: No edema.      Left lower leg: No edema.   Skin:     General: Skin is warm and dry.   Neurological:      Mental Status: He is alert. Mental status is at baseline.      Motor: Weakness present.   Psychiatric:         Mood and Affect: Mood " normal.         Behavior: Behavior normal.             Significant Labs: All pertinent labs within the past 24 hours have been reviewed.    Significant Imaging: I have reviewed all pertinent imaging results/findings within the past 24 hours.

## 2024-05-21 NOTE — PLAN OF CARE
Problem: Adult Inpatient Plan of Care  Goal: Plan of Care Review  Outcome: Progressing  Goal: Patient-Specific Goal (Individualized)  Outcome: Progressing  Goal: Optimal Comfort and Wellbeing  Outcome: Progressing     Problem: Fall Injury Risk  Goal: Absence of Fall and Fall-Related Injury  Outcome: Progressing     Problem: Skin Injury Risk Increased  Goal: Skin Health and Integrity  Outcome: Progressing     Problem: Wound  Goal: Optimal Pain Control and Function  Outcome: Progressing  Goal: Skin Health and Integrity  Outcome: Progressing  Goal: Optimal Wound Healing  Outcome: Progressing     Problem: Pain Acute  Goal: Optimal Pain Control and Function  Outcome: Progressing     Problem: Infection  Goal: Absence of Infection Signs and Symptoms  Outcome: Progressing     Problem: Pneumonia  Goal: Effective Oxygenation and Ventilation  Outcome: Progressing

## 2024-05-21 NOTE — CONSULTS
Chief complaint:  Altered Mental Status (Arrived via EMS from home. Per ems, family states that pt is hospice but full code. Pt has been refusing to eat or drink for last week and has had some intermittent confusion. Pt was also 70s RA sats. ) and Cough (Wet cough)      HPI:  Robert Nava is a 89 y.o. male presenting with the followinf wounds today:  Left lateral foot DTI  Right heel DTI  Right lateral heel DTI  Right lateral ankle DTI  Right hip DTI  Right thigh DTI  Midback spine DTI  BL shoulder blades DTI's  Sacral DTI with unstagable pressure ulcer  All wounds were POA and a caregiver at bedside reported he is in bed all the time. Pt was slightly confused, and unable to give any significant history. No other complaints today    PMH:  As per HPI and below:  Past Medical History:   Diagnosis Date    BPH (benign prostatic hyperplasia)     Hyperlipidemia     Hypertension     Prostate cancer     He was diagnosed in .  He never received definitive treatment, but received androgen deprivation.  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  His PSA has risen on Lupron and Xtandi.  He is now being switched to Xgeva.    Prostate cancer metastatic to bone     He does complain of left hip pain, which has been worsening over the last couple of months.  This is consistent with his bone scan and CT findings.  I recommended palliative radiation treatment to the left acetabulum.       Social History     Socioeconomic History    Marital status:    Tobacco Use    Smoking status: Former     Current packs/day: 0.00     Types: Cigarettes     Quit date: 1972     Years since quittin.4    Smokeless tobacco: Current     Types: Chew   Substance and Sexual Activity    Alcohol use: Yes     Alcohol/week: 1.0 standard drink of alcohol     Types: 1 Shots of liquor per week     Comment: daily    Drug use: Never    Sexual activity: Not Currently   Social History Narrative    ** Merged History Encounter **          Social  Determinants of Health     Financial Resource Strain: Low Risk  (8/24/2023)    Overall Financial Resource Strain (CARDIA)     Difficulty of Paying Living Expenses: Not very hard   Food Insecurity: No Food Insecurity (8/24/2023)    Hunger Vital Sign     Worried About Running Out of Food in the Last Year: Never true     Ran Out of Food in the Last Year: Never true   Transportation Needs: No Transportation Needs (8/24/2023)    PRAPARE - Transportation     Lack of Transportation (Medical): No     Lack of Transportation (Non-Medical): No   Physical Activity: Inactive (8/24/2023)    Exercise Vital Sign     Days of Exercise per Week: 0 days     Minutes of Exercise per Session: 0 min   Stress: No Stress Concern Present (8/24/2023)    [employer] [place] [employer] - Occupational Stress Questionnaire     Feeling of Stress : Only a little   Housing Stability: Unknown (8/24/2023)    Housing Stability Vital Sign     Unable to Pay for Housing in the Last Year: No     Unstable Housing in the Last Year: No       Past Surgical History:   Procedure Laterality Date    COLONOSCOPY      aprox 2008    COLONOSCOPY N/A 7/27/2020    Procedure: COLONOSCOPY;  Surgeon: Jersey Adam MD;  Location: [facility];  Service: General;  Laterality: N/A;    TONSILLECTOMY         Family History   Problem Relation Name Age of Onset    Cancer Brother      Heart disease Daughter         Review of patient's allergies indicates:  No Known Allergies    No current facility-administered medications on file prior to encounter.     Current Outpatient Medications on File Prior to Encounter   Medication Sig Dispense Refill    amLODIPine (NORVASC) 10 MG tablet Take 1 tablet (10 mg total) by mouth once daily. 90 tablet 1    bisacodyL (DULCOLAX) 10 mg Supp Place 1 suppository (10 mg total) rectally daily as needed (for constipation). 15 suppository 0    calcium-vitamin D 600 mg-10 mcg (400 unit) Tab 1 tab, Oral, BID, # 180 tab, 3 Refill(s),  "Pharmacy: [place], [place] MAIL ONLY, 180.34, cm, 22 8:23:00 CDT, Height/Length Measured, 87.1, kg, 22 8:23:00 CDT, Weight Dosing 180 tablet 1    docusate sodium (COLACE) 100 MG capsule Take 1 capsule (100 mg total) by mouth 2 (two) times daily. (Patient not taking: Reported on 10/10/2023) 60 capsule 0    gabapentin (NEURONTIN) 300 MG capsule Take 1 capsule (300 mg total) by mouth 3 (three) times daily. 270 capsule 1    ondansetron (ZOFRAN-ODT) 4 MG TbDL Take 4 mg by mouth every 8 (eight) hours as needed.      oxyCODONE-acetaminophen (PERCOCET)  mg per tablet Take 1 tablet by mouth every 4 (four) hours as needed.      predniSONE (DELTASONE) 5 MG tablet Take 5 mg by mouth 2 (two) times daily.      simvastatin (ZOCOR) 40 MG tablet Take 1 tablet (40 mg total) by mouth every evening. 90 tablet 1    tamsulosin (FLOMAX) 0.4 mg Cap Take 1 capsule (0.4 mg total) by mouth every evening. 90 capsule 1       ROS: As per HPI and below:  Pertinent items are noted in HPI.      Physical Exam:     Vitals:    24 1610 24 1613 24 1916 24 2130   BP: (!) 105/56  (!) 105/53    BP Location:   Right arm    Patient Position:   Lying    Pulse: 68  84    Resp: 16 16 20 (!) 21   Temp: 97.5 °F (36.4 °C)  97 °F (36.1 °C)    TempSrc:   Axillary    SpO2: 95%  (!) 94%    Weight:       Height:           BP  Min: 80/48  Max: 125/66  Temp  Av.5 °F (36.4 °C)  Min: 97 °F (36.1 °C)  Max: 98.3 °F (36.8 °C)  Pulse  Av.9  Min: 65  Max: 98  Resp  Av.8  Min: 14  Max: 21  SpO2  Av.2 %  Min: 77 %  Max: 96 %  Height  Av' 11" (180.3 cm)  Min: 5' 11" (180.3 cm)  Max: 5' 11" (180.3 cm)  Weight  Av kg (149 lb 15.5 oz)  Min: 68 kg (149 lb 14.6 oz)  Max: 68 kg (150 lb)    Body mass index is 20.91 kg/m².          General:             Well developed, well nourished, no apparent distress  HEENT:              NCAT, no JVD, mucous membranes moist, EOM intact  Cardiovascular:  Regular rate " and rhythm, normal S1, normal S2, No murmurs, rubs, or gallops  Respiratory:        Normal breath sounds, no wheezes, no rales, no rhonchi  Abdomen:           Bowel sounds present, non tender, non distended, no masses, no hepatojugular reflux  Extremities:        No clubbing, no cyanosis, no edema  Vascular:            2+ b/l radial.  Peripheral pulses intact.  No carotid bruits.  Neurological:      No focal deficits  Skin:                   No obvious rashes or erythema, wounds as follows:  Left lateral foot DTI  Right heel DTI  Right lateral heel DTI  Right lateral ankle DTI  Right hip DTI  Right thigh DTI  Midback spine DTI  BL shoulder blades DTI's  Sacral DTI with unstagable pressure ulcer                                                          Lab Results   Component Value Date    WBC 23.49 (H) 05/20/2024    HGB 8.8 (L) 05/20/2024    HCT 29.1 (L) 05/20/2024    MCV 82 05/20/2024     05/20/2024     Lab Results   Component Value Date    CHOL 158 08/24/2023    CHOL 167 04/16/2018     Lab Results   Component Value Date    HDL 50 08/24/2023     (H) 04/16/2018     Lab Results   Component Value Date    LDLCALC 88.4 08/24/2023    LDLCALC 50.0 (L) 04/16/2018     Lab Results   Component Value Date    TRIG 98 08/24/2023    TRIG 30 04/16/2018     Lab Results   Component Value Date    CHOLHDL 31.6 08/24/2023    CHOLHDL 66.5 (H) 04/16/2018     CMP  Recent Labs   Lab 05/20/24  0438      CALCIUM 7.2*   ALBUMIN 2.1*   PROT 5.2*      K 5.3*   CO2 22*      BUN 75*   CREATININE 3.1*   ALKPHOS 293*   ALT 10   AST 26   BILITOT 2.3*      Lab Results   Component Value Date    TSH 0.868 08/23/2023             Assessment and Recommendations       Diagnoses:    Left lateral foot DTI  Right heel DTI  Right lateral heel DTI  Right lateral ankle DTI  Right hip DTI  Right thigh DTI  Midback spine DTI  BL shoulder blades DTI's  Sacral DTI with unstagable pressure ulcer    Plan:  [place] to all wounds q  shift    Complexity:    moderate

## 2024-05-21 NOTE — ASSESSMENT & PLAN NOTE
Improving.  -Renally dose medications  -Avoid nephrotoxic agents  -Monitor UOP and electrolytes  -Trend creatinine  -IV fluids

## 2024-05-21 NOTE — PROGRESS NOTES
"[facility] Medicine  Progress Note    Patient Name: Robert Nava  MRN: [id number]  Patient Class: IP- Inpatient   Admission Date: 5/19/2024  Length of Stay: 2 days  Attending Physician: Neftaly Ledezma MD  Primary Care Provider: Carlie, Primary Doctor        Subjective:     Principal Problem:Acute hypoxic respiratory failure        HPI:  89M p/w general malaise characterized as "feeling badly" w/ associated anorexia resulting in poor PO intake of solids and liquids for several days. His wife (Radha Nava) who was present during our encounter endorses that he has also had non-productive, "wet" sounding cough, reduced mobility, and visible respiratory discomfort. He has reportedly otherwise been taking all of his prescribed medications, and he has reportedly not consumed alcohol since moving in with Radha in [facility] in October 2023. Patient had been enrolled with [facility] here in [place].    Overview/Hospital Course:  Robert Nava is a 89 year old male with a past medical history of metastatic prostate cancer on hospice, HTN, HLD, and anemia who presented with acute hypoxic respiratory failure in the setting of aspiration pneumonia with RIP. He is stable on 4 L NC and Unasyn. His kidney function is improving with IV fluids. He is pending discharge home with home hospice.    Interval History: see "Hospital Course"    Review of Systems   Constitutional:  Positive for activity change, appetite change and fatigue.   Respiratory:  Positive for shortness of breath.    Neurological:  Positive for weakness.     Objective:     Vital Signs (Most Recent):  Temp: 97.5 °F (36.4 °C) (05/21/24 0755)  Pulse: 80 (05/21/24 0755)  Resp: 15 (05/21/24 0755)  BP: (!) 91/54 (05/21/24 0755)  SpO2: (!) 94 % (05/21/24 0755) Vital Signs (24h Range):  Temp:  [97 °F (36.1 °C)-97.6 °F (36.4 °C)] 97.5 °F (36.4 °C)  Pulse:  [61-84] 80  Resp:  [15-21] 15  SpO2:  [92 %-96 %] 94 %  BP: " ()/(50-56) 91/54     Weight: 68 kg (149 lb 14.6 oz)  Body mass index is 20.91 kg/m².    Intake/Output Summary (Last 24 hours) at 5/21/2024 1030  Last data filed at 5/21/2024 0625  Gross per 24 hour   Intake 4658.44 ml   Output 50 ml   Net 4608.44 ml         Physical Exam  Vitals and nursing note reviewed.   Constitutional:       Appearance: He is ill-appearing.   HENT:      Head: Normocephalic and atraumatic.      Right Ear: External ear normal.      Left Ear: External ear normal.      Nose: Nose normal.      Mouth/Throat:      Mouth: Mucous membranes are moist.      Pharynx: Oropharynx is clear.   Eyes:      Extraocular Movements: Extraocular movements intact.      Conjunctiva/sclera: Conjunctivae normal.   Cardiovascular:      Rate and Rhythm: Normal rate and regular rhythm.      Pulses: Normal pulses.      Heart sounds: Normal heart sounds.   Pulmonary:      Breath sounds: Rhonchi present.      Comments: NC.  Abdominal:      General: Bowel sounds are normal.      Palpations: Abdomen is soft.   Musculoskeletal:      Cervical back: Normal range of motion and neck supple.      Right lower leg: No edema.      Left lower leg: No edema.   Skin:     General: Skin is warm and dry.   Neurological:      Mental Status: He is alert. Mental status is at baseline.      Motor: Weakness present.   Psychiatric:         Mood and Affect: Mood normal.         Behavior: Behavior normal.             Significant Labs: All pertinent labs within the past 24 hours have been reviewed.    Significant Imaging: I have reviewed all pertinent imaging results/findings within the past 24 hours.    Assessment/Plan:      * Acute hypoxic respiratory failure  -NC O2  -DNR  -Unasyn  -PO diet with aspiration precautions      Acute metabolic encephalopathy  In setting of metastatic cancer, RIP, hypoxic respiratory failure and aspiration pneumonia.  -Home hospice    RIP (acute kidney injury)  Improving.  -Renally dose medications  -Avoid  nephrotoxic agents  -Monitor UOP and electrolytes  -Trend creatinine  -IV fluids    Aspiration pneumonia of both lower lobes  -NC O2  -Aspiration precautions  -Continue die  -Unasyn    Severe protein-calorie malnutrition  Nutrition consulted. Most recent weight and BMI monitored-     Measurements:  Wt Readings from Last 1 Encounters:   05/20/24 68 kg (149 lb 14.6 oz)   Body mass index is 20.91 kg/m².    Patient has been screened and assessed by RD.    Malnutrition Type:  Context: chronic illness  Level: severe    Malnutrition Characteristic Summary:  Weight Loss (Malnutrition): greater than 10% in 6 months  Energy Intake (Malnutrition): less than 75% for greater than or equal to 1 month  Subcutaneous Fat (Malnutrition): severe depletion  Muscle Mass (Malnutrition): severe depletion  Fluid Accumulation (Malnutrition): mild    Interventions/Recommendations (treatment strategy):  Hospice         Normocytic anemia  Stable.  -Trend Hgb with CBC    BPH without obstruction/lower urinary tract symptoms  D/w RN, retaining 1L in ED, straight cath. Perform q6h bladder scan and if > 500, place garcia tonight. Cont flomax      Malignant neoplasm metastatic to bone  Patient has metastatic cancer of  prostate primary. The cancer has metastasized to  bone. The patient is under the care of an outpatient oncologist. The patient is not undergoing active chemotherapy. .Their staging information is listed below.   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  2. Peritoneal nodularity anterior to liver.  Peritoneal carcinomatosis not excluded.  3. Diffuse osseous metastatic disease.   Cancer Staging   No matching staging information was found for the patient.     Plan for inpatient hospice      Hypertension  -Hold home medications  -Continue to monitor    Hyperlipidemia  -Statin      Prostate cancer  Stage IV.  -PRN analgesics  -Arrange discharge back to home hospice        VTE Risk  Mitigation (From admission, onward)           Ordered     heparin (porcine) injection 5,000 Units  Every 8 hours         05/19/24 2305     IP VTE HIGH RISK PATIENT  Once         05/19/24 2305     Place sequential compression device  Until discontinued         05/19/24 2305                    Discharge Planning   DEMARIO:      Code Status: DNR   Is the patient medically ready for discharge?:     Reason for patient still in hospital (select all that apply): Pending disposition  Discharge Plan A: Hospice/home                  Neftaly Ledezma MD  Department of Hospital Medicine   [facility]/Surg

## 2024-05-21 NOTE — PT/OT/SLP PROGRESS
Speech Language Pathology      Robert Nava  MRN: [id number]    Patient not seen today secondary to  (Therapist unavailable). Will follow-up 5/21/24.

## 2024-05-21 NOTE — ASSESSMENT & PLAN NOTE
In setting of metastatic cancer, RIP, hypoxic respiratory failure and aspiration pneumonia.  -Home hospice

## 2024-05-22 VITALS
DIASTOLIC BLOOD PRESSURE: 53 MMHG | HEIGHT: 71 IN | HEART RATE: 82 BPM | BODY MASS INDEX: 20.99 KG/M2 | SYSTOLIC BLOOD PRESSURE: 99 MMHG | WEIGHT: 149.94 LBS | OXYGEN SATURATION: 98 % | TEMPERATURE: 99 F | RESPIRATION RATE: 18 BRPM

## 2024-05-22 PROBLEM — G93.41 ACUTE METABOLIC ENCEPHALOPATHY: Status: RESOLVED | Noted: 2024-05-19 | Resolved: 2024-05-22

## 2024-05-22 LAB
ALBUMIN SERPL BCP-MCNC: 1.3 G/DL (ref 3.5–5.2)
ALP SERPL-CCNC: 373 U/L (ref 55–135)
ALT SERPL W/O P-5'-P-CCNC: 15 U/L (ref 10–44)
ANION GAP SERPL CALC-SCNC: 13 MMOL/L (ref 8–16)
ANISOCYTOSIS BLD QL SMEAR: ABNORMAL
AST SERPL-CCNC: 47 U/L (ref 10–40)
BASOPHILS # BLD AUTO: ABNORMAL K/UL (ref 0–0.2)
BASOPHILS NFR BLD: 0 % (ref 0–1.9)
BILIRUB SERPL-MCNC: 1.6 MG/DL (ref 0.1–1)
BUN SERPL-MCNC: 63 MG/DL (ref 10–30)
CALCIUM SERPL-MCNC: 7.9 MG/DL (ref 8.7–10.5)
CHLORIDE SERPL-SCNC: 110 MMOL/L (ref 95–110)
CO2 SERPL-SCNC: 15 MMOL/L (ref 23–29)
CREAT SERPL-MCNC: 2.2 MG/DL (ref 0.5–1.4)
DIFFERENTIAL METHOD BLD: ABNORMAL
EOSINOPHIL # BLD AUTO: ABNORMAL K/UL (ref 0–0.5)
EOSINOPHIL NFR BLD: 0 % (ref 0–8)
ERYTHROCYTE [DISTWIDTH] IN BLOOD BY AUTOMATED COUNT: 23.3 % (ref 11.5–14.5)
EST. GFR  (NO RACE VARIABLE): 28 ML/MIN/1.73 M^2
GLUCOSE SERPL-MCNC: 56 MG/DL (ref 70–110)
HCT VFR BLD AUTO: 30 % (ref 40–54)
HGB BLD-MCNC: 8.8 G/DL (ref 14–18)
HYPOCHROMIA BLD QL SMEAR: ABNORMAL
IMM GRANULOCYTES # BLD AUTO: ABNORMAL K/UL (ref 0–0.04)
IMM GRANULOCYTES NFR BLD AUTO: ABNORMAL % (ref 0–0.5)
LYMPHOCYTES # BLD AUTO: ABNORMAL K/UL (ref 1–4.8)
LYMPHOCYTES NFR BLD: 8 % (ref 18–48)
MAGNESIUM SERPL-MCNC: 2.4 MG/DL (ref 1.6–2.6)
MCH RBC QN AUTO: 24.6 PG (ref 27–31)
MCHC RBC AUTO-ENTMCNC: 29.3 G/DL (ref 32–36)
MCV RBC AUTO: 84 FL (ref 82–98)
METAMYELOCYTES NFR BLD MANUAL: 2 %
MONOCYTES # BLD AUTO: ABNORMAL K/UL (ref 0.3–1)
MONOCYTES NFR BLD: 2 % (ref 4–15)
MYELOCYTES NFR BLD MANUAL: 2 %
NEUTROPHILS NFR BLD: 85 % (ref 38–73)
NEUTS BAND NFR BLD MANUAL: 1 %
NRBC BLD-RTO: 0 /100 WBC
OVALOCYTES BLD QL SMEAR: ABNORMAL
PLATELET # BLD AUTO: 328 K/UL (ref 150–450)
PLATELET BLD QL SMEAR: ABNORMAL
PMV BLD AUTO: 9.7 FL (ref 9.2–12.9)
POCT GLUCOSE: 48 MG/DL (ref 70–110)
POCT GLUCOSE: 50 MG/DL (ref 70–110)
POCT GLUCOSE: 74 MG/DL (ref 70–110)
POCT GLUCOSE: 98 MG/DL (ref 70–110)
POIKILOCYTOSIS BLD QL SMEAR: SLIGHT
POLYCHROMASIA BLD QL SMEAR: ABNORMAL
POTASSIUM SERPL-SCNC: 4.5 MMOL/L (ref 3.5–5.1)
PROT SERPL-MCNC: 5 G/DL (ref 6–8.4)
RBC # BLD AUTO: 3.57 M/UL (ref 4.6–6.2)
SODIUM SERPL-SCNC: 138 MMOL/L (ref 136–145)
WBC # BLD AUTO: 17.91 K/UL (ref 3.9–12.7)

## 2024-05-22 PROCEDURE — 25000003 PHARM REV CODE 250: Performed by: STUDENT IN AN ORGANIZED HEALTH CARE EDUCATION/TRAINING PROGRAM

## 2024-05-22 PROCEDURE — 85027 COMPLETE CBC AUTOMATED: CPT | Performed by: HOSPITALIST

## 2024-05-22 PROCEDURE — 63600175 PHARM REV CODE 636 W HCPCS: Performed by: HOSPITALIST

## 2024-05-22 PROCEDURE — 94761 N-INVAS EAR/PLS OXIMETRY MLT: CPT

## 2024-05-22 PROCEDURE — 25000003 PHARM REV CODE 250: Performed by: HOSPITALIST

## 2024-05-22 PROCEDURE — 83735 ASSAY OF MAGNESIUM: CPT | Performed by: HOSPITALIST

## 2024-05-22 PROCEDURE — 85007 BL SMEAR W/DIFF WBC COUNT: CPT | Performed by: HOSPITALIST

## 2024-05-22 PROCEDURE — 92526 ORAL FUNCTION THERAPY: CPT

## 2024-05-22 PROCEDURE — 36415 COLL VENOUS BLD VENIPUNCTURE: CPT | Performed by: HOSPITALIST

## 2024-05-22 PROCEDURE — 80053 COMPREHEN METABOLIC PANEL: CPT | Performed by: HOSPITALIST

## 2024-05-22 PROCEDURE — 27000221 HC OXYGEN, UP TO 24 HOURS

## 2024-05-22 PROCEDURE — 25000003 PHARM REV CODE 250: Performed by: NURSE PRACTITIONER

## 2024-05-22 RX ORDER — AMOXICILLIN AND CLAVULANATE POTASSIUM 500; 125 MG/1; MG/1
1 TABLET, FILM COATED ORAL 3 TIMES DAILY
Qty: 15 TABLET | Refills: 0 | Status: SHIPPED | OUTPATIENT
Start: 2024-05-22 | End: 2024-05-27

## 2024-05-22 RX ADMIN — AMPICILLIN SODIUM AND SULBACTAM SODIUM 1.5 G: 1; .5 INJECTION, POWDER, FOR SOLUTION INTRAMUSCULAR; INTRAVENOUS at 11:05

## 2024-05-22 RX ADMIN — SODIUM CHLORIDE: 9 INJECTION, SOLUTION INTRAVENOUS at 03:05

## 2024-05-22 RX ADMIN — AMPICILLIN SODIUM AND SULBACTAM SODIUM 1.5 G: 1; .5 INJECTION, POWDER, FOR SOLUTION INTRAMUSCULAR; INTRAVENOUS at 12:05

## 2024-05-22 RX ADMIN — HEPARIN SODIUM 5000 UNITS: 5000 INJECTION, SOLUTION INTRAVENOUS; SUBCUTANEOUS at 05:05

## 2024-05-22 RX ADMIN — OXYCODONE HYDROCHLORIDE AND ACETAMINOPHEN 1 TABLET: 10; 325 TABLET ORAL at 09:05

## 2024-05-22 RX ADMIN — GABAPENTIN 100 MG: 100 CAPSULE ORAL at 09:05

## 2024-05-22 RX ADMIN — GABAPENTIN 100 MG: 100 CAPSULE ORAL at 01:05

## 2024-05-22 RX ADMIN — HEPARIN SODIUM 5000 UNITS: 5000 INJECTION, SOLUTION INTRAVENOUS; SUBCUTANEOUS at 01:05

## 2024-05-22 RX ADMIN — POLYETHYLENE GLYCOL 3350 17 G: 17 POWDER, FOR SOLUTION ORAL at 09:05

## 2024-05-22 RX ADMIN — SENNOSIDES AND DOCUSATE SODIUM 1 TABLET: 50; 8.6 TABLET ORAL at 09:05

## 2024-05-22 RX ADMIN — PREDNISONE 5 MG: 5 TABLET ORAL at 09:05

## 2024-05-22 NOTE — NURSING
Lab called and stated glucose was 56 on blood draw. Rechecked via fingerstick on both hands. Results were 50 and 48. Informed Mica Espinal NP. Gave juice and was told per NP to recheck in a little bit. Patient was alert and oriented and without symptoms. Will continue to monitor.

## 2024-05-22 NOTE — DISCHARGE SUMMARY
"[facility] Medicine  Discharge Summary      Patient Name: Robert Nava  MRN: [id number]  ALISE: [id number]  Patient Class: IP- Inpatient  Admission Date: 5/19/2024  Hospital Length of Stay: 3 days  Discharge Date and Time: No discharge date for patient encounter.  Attending Physician: Neftaly Ledezma MD   Discharging Provider: Neftaly Ledezma MD  Primary Care Provider: Carlie, Primary Doctor    Primary Care Team: Networked reference to record PCT     HPI:   89M p/w general malaise characterized as "feeling badly" w/ associated anorexia resulting in poor PO intake of solids and liquids for several days. His wife (Radha Nava) who was present during our encounter endorses that he has also had non-productive, "wet" sounding cough, reduced mobility, and visible respiratory discomfort. He has reportedly otherwise been taking all of his prescribed medications, and he has reportedly not consumed alcohol since moving in with Radha in [facility] in October 2023. Patient had been enrolled with [facility] here in [place].    * No surgery found *      Hospital Course:   Robert Nava is a 89 year old male with a past medical history of metastatic prostate cancer on hospice, HTN, HLD, and anemia who presented with acute hypoxic respiratory failure in the setting of aspiration pneumonia with RIP. He is stable on 4 L NC and Unasyn. His kidney function is improved with IV fluids. He was discharged home with home hospice 5/22.     Goals of Care Treatment Preferences:  Code Status: DNR          What is most important right now is to focus on symptom/pain control, comfort and QOL .  Accordingly, we have decided that the best plan to meet the patient's goals includes pivot to comfort-focused care.      Consults:   Consults (From admission, onward)          Status Ordering Provider     Inpatient consult to Social Work/Case Management  Once        Provider:  (Not yet assigned)    " Completed IDALIA LI     Case Management/  Once        Provider:  (Not yet assigned)    Completed MAYDA CASTRO     Inpatient consult to Registered Dietitian/Nutritionist  Once        Provider:  (Not yet assigned)    Completed MAYDA CASTRO            Pulmonary  * Acute hypoxic respiratory failure  -NC O2 per hospice  -DNR  -Augmentin  -PO diet with aspiration precautions      Aspiration pneumonia of both lower lobes  -Augmentin  -O2 per hospice    Cardiac/Vascular  Hypertension  Chronic. Stable.    Hyperlipidemia  -Statin      Renal/  RIP (acute kidney injury)  Improving.  -Renally dose medications  -Avoid nephrotoxic agents  -Monitor UOP and electrolytes  -Trend creatinine  -IV fluids    Oncology  Normocytic anemia  Stable.  -Trend Hgb with CBC    Prostate cancer  Stage IV.  -PRN analgesics  -Arrange discharge back to home hospice 5/22      Endocrine  Severe protein-calorie malnutrition  Nutrition consulted. Most recent weight and BMI monitored-     Measurements:  Wt Readings from Last 1 Encounters:   05/20/24 68 kg (149 lb 14.6 oz)   Body mass index is 20.91 kg/m².    Patient has been screened and assessed by RD.    Malnutrition Type:  Context: chronic illness  Level: severe    Malnutrition Characteristic Summary:  Weight Loss (Malnutrition): greater than 10% in 6 months  Energy Intake (Malnutrition): less than 75% for greater than or equal to 1 month  Subcutaneous Fat (Malnutrition): severe depletion  Muscle Mass (Malnutrition): severe depletion  Fluid Accumulation (Malnutrition): mild    Interventions/Recommendations (treatment strategy):  Hospice           Final Active Diagnoses:    Diagnosis Date Noted POA    PRINCIPAL PROBLEM:  Acute hypoxic respiratory failure [J96.01] 05/19/2024 Yes    Aspiration pneumonia of both lower lobes [J69.0] 05/19/2024 Yes    RIP (acute kidney injury) [N17.9] 05/19/2024 Yes    Severe protein-calorie malnutrition [E43] 05/20/2024 Yes     Chronic     Normocytic anemia [D64.9] 08/23/2023 Yes    Prostate cancer [C61]  Yes    Hypertension [I10]  Yes    Hyperlipidemia [E78.5]  Yes      Problems Resolved During this Admission:    Diagnosis Date Noted Date Resolved POA    Acute metabolic encephalopathy [G93.41] 05/19/2024 05/22/2024 Yes    Hyperkalemia [E87.5] 05/19/2024 05/21/2024 Yes       Discharged Condition: stable    Disposition: Hospice/Home    Follow Up:    Patient Instructions:      Diet Adult Regular     Notify your health care provider if you experience any of the following:  increased confusion or weakness     Notify your health care provider if you experience any of the following:  persistent dizziness, light-headedness, or visual disturbances     Notify your health care provider if you experience any of the following:  severe persistent headache     Notify your health care provider if you experience any of the following:  difficulty breathing or increased cough     Notify your health care provider if you experience any of the following:  temperature >100.4     Notify your health care provider if you experience any of the following:  persistent nausea and vomiting or diarrhea     Notify your health care provider if you experience any of the following:  severe uncontrolled pain     Activity as tolerated       Significant Diagnostic Studies: Labs: All labs within the past 24 hours have been reviewed    Pending Diagnostic Studies:       None           Medications:  Reconciled Home Medications:      Medication List        START taking these medications      amoxicillin-clavulanate 500-125mg 500-125 mg Tab  Commonly known as: AUGMENTIN  Take 1 tablet (500 mg total) by mouth 3 (three) times daily. for 5 days            CONTINUE taking these medications      amLODIPine 10 MG tablet  Commonly known as: NORVASC  Take 1 tablet (10 mg total) by mouth once daily.     bisacodyL 10 mg Supp  Commonly known as: DULCOLAX  Place 1 suppository (10 mg total) rectally daily  as needed (for constipation).     calcium-vitamin D 600 mg-10 mcg (400 unit) Tab  1 tab, Oral, BID, # 180 tab, 3 Refill(s), Pharmacy: [place], [place] MAIL ONLY, 180.34, cm, 11/04/22 8:23:00 CDT, Height/Length Measured, 87.1, kg, 11/04/22 8:23:00 CDT, Weight Dosing     docusate sodium 100 MG capsule  Commonly known as: COLACE  Take 1 capsule (100 mg total) by mouth 2 (two) times daily.     gabapentin 300 MG capsule  Commonly known as: NEURONTIN  Take 1 capsule (300 mg total) by mouth 3 (three) times daily.     ondansetron 4 MG Tbdl  Commonly known as: ZOFRAN-ODT  Take 4 mg by mouth every 8 (eight) hours as needed.     oxyCODONE-acetaminophen  mg per tablet  Commonly known as: PERCOCET  Take 1 tablet by mouth every 4 (four) hours as needed.     predniSONE 5 MG tablet  Commonly known as: DELTASONE  Take 5 mg by mouth 2 (two) times daily.     simvastatin 40 MG tablet  Commonly known as: ZOCOR  Take 1 tablet (40 mg total) by mouth every evening.     tamsulosin 0.4 mg Cap  Commonly known as: FLOMAX  Take 1 capsule (0.4 mg total) by mouth every evening.              Indwelling Lines/Drains at time of discharge:   Lines/Drains/Airways       Drain  Duration                  Urethral Catheter 05/21/24 1830 16 Fr. <1 day                    Time spent on the discharge of patient: 34 minutes         Neftaly Ledezma MD  Department of Hospital Medicine  [facility]/Surg

## 2024-05-22 NOTE — PLAN OF CARE
Patient cleared for discharge from case management standpoint.    KASSIE spoke to patient's spouse Radha Nava who stated she will be home late this evening to accept patient.  KASSIE scheduled ambulance transportation with  6pm.      KASSIE spoke to Anatoly with [facility] [phone] to inform of resumption of hospice.  Anatoly verbalized understanding.       05/22/24 1152   Final Note   Assessment Type Final Discharge Note   Anticipated Discharge Disposition [facility] Resources/Appts/Education Provided Provided patient/caregiver with written discharge plan information;Provided education on problems/symptoms using teachback;Post-Acute resouces added to AVS   Post-Acute Status   Post-Acute Authorization Hospice   Hospice Status Set-up Complete/Auth obtained   Discharge Delays (!) Ambulance Transport/Facility Transport

## 2024-05-22 NOTE — PLAN OF CARE
Plan of care reviewed with patient. IV intact and patent with fluids infusing. Tele in place and being monitored. [place] in place and draining. No pain or nausea noted. O2 in place. Safety maintained. Call light in reach and instructed to call for assistance. Will continue to monitor.

## 2024-05-22 NOTE — PT/OT/SLP PROGRESS
"Speech Language Pathology Treatment    Patient Name:  Robert Nava   MRN:  [id number]  Admitting Diagnosis: Acute hypoxic respiratory failure    Recommendations:                 General Recommendations:  Dysphagia therapy at low intensity level if warranted for consistency upgrade or downgrade  Diet recommendations: clinician able to see pt only with thin liquid today due to pt declining further intake -- tolerated single sips thin liquid x 2 from straw (See yesterday's evaluation during which  Full Liquid Diet was recommended)  Aspiration Precautions: constant supervision/assist; slow rate; pt must be alert; suggest small bolus size and single sips liquid followed by breathing break  General Precautions: Standard, aspiration, fall  Communication strategies:   reduce distractions    Assessment:     Robert Nava is a 89 y.o. male with an SLP diagnosis of Dysphagia.  He presents with oral stage preparation delays; he is at risk for aspiration due to impaired respiratory status/weakness.  Chart indicates pt going home with hospice. No caregivers at bedside.  Spoke briefly with nursing (no complaint from nursing as to full liquid diet consistency).    Subjective     "I hope I'm going home today."  Patient goals: go home     Pain/Comfort:  Pain Rating 1: 0/10    Respiratory Status: Nasal cannula, flow 4 L/min    Objective:     Has the patient been evaluated by SLP for swallowing?   Yes  Keep patient NPO? No   Current Respiratory Status:    nasal cannula    Pt seen briefly after checking with nursing; no family at bedside.  Pt not able to identify swallow safety precautions.  Clinician identified precautions for patient; pt verbalized understanding.  Pt tolerated 2-3 single sips liquid from straw, separate by breathing breaks.  No overt sign of aspiration.  Oral stage of swallow appeared delayed due to delay in initiation of oral preparation (holding liquid in mouth a couple seconds prior to swallow).    Goals: "   Multidisciplinary Problems       SLP Goals          Problem: SLP    Goal Priority Disciplines Outcome   SLP Goal     SLP Progressing   Description: 1. The patient will increase endurance to complete a 75% of a meal in 30 minutes or less for the LRD with no s/s of aspiration.  2. Family will be provided with written swallow safety guidelines with education re: strategies to increase oral efficiency and decrease air way compromise.  .goalbox                       Plan:     Patient to be seen:  3 x/week   Plan of Care expires:  05/29/24  Plan of Care reviewed with:  patient  SLP Follow-Up:  Yes       Discharge recommendations:  Low Intensity Therapy (chart indicates pt going home with hospice; low intensity tx as warranted)   Barriers to Discharge:   unknown    Time Tracking:     SLP Treatment Date:   05/22/24  Speech Start Time:  1214  Speech Stop Time:  1223     Speech Total Time (min):  9 min    Billable Minutes: Treatment Swallowing Dysfunction 9    05/22/2024

## 2024-05-23 LAB
BACTERIA BLD CULT: ABNORMAL

## 2024-05-24 LAB — BACTERIA BLD CULT: NORMAL

## (undated) DEVICE — GLOVE SURG ULTRA TOUCH 7

## (undated) DEVICE — Device

## (undated) DEVICE — SOL WATER STRL IRR 1000ML

## (undated) DEVICE — CANISTER SUCTION 3000CC

## (undated) DEVICE — SEE MEDLINE ITEM 146416

## (undated) DEVICE — GLOVE PI ULTRA TOUCH G SURGEON

## (undated) DEVICE — KIT DEFENDO VLV  AIR WATER SUC

## (undated) DEVICE — PAD ELECTROSURGICAL SPL W/CORD

## (undated) DEVICE — SYR SLIP TIP 5CC

## (undated) DEVICE — FORCEP BIOSY RJ 4 HOT 2.2X240